# Patient Record
Sex: MALE | Race: WHITE | NOT HISPANIC OR LATINO | URBAN - METROPOLITAN AREA
[De-identification: names, ages, dates, MRNs, and addresses within clinical notes are randomized per-mention and may not be internally consistent; named-entity substitution may affect disease eponyms.]

---

## 2017-01-24 VITALS
HEART RATE: 60 BPM | HEART RATE: 61 BPM | RESPIRATION RATE: 17 BRPM | SYSTOLIC BLOOD PRESSURE: 138 MMHG | SYSTOLIC BLOOD PRESSURE: 135 MMHG | DIASTOLIC BLOOD PRESSURE: 60 MMHG | RESPIRATION RATE: 20 BRPM | HEIGHT: 66 IN | DIASTOLIC BLOOD PRESSURE: 79 MMHG | RESPIRATION RATE: 18 BRPM | SYSTOLIC BLOOD PRESSURE: 131 MMHG | SYSTOLIC BLOOD PRESSURE: 142 MMHG | OXYGEN SATURATION: 96 % | OXYGEN SATURATION: 92 % | HEART RATE: 67 BPM | WEIGHT: 235 LBS | HEART RATE: 59 BPM | SYSTOLIC BLOOD PRESSURE: 129 MMHG | DIASTOLIC BLOOD PRESSURE: 82 MMHG | SYSTOLIC BLOOD PRESSURE: 169 MMHG | DIASTOLIC BLOOD PRESSURE: 74 MMHG | HEART RATE: 62 BPM | OXYGEN SATURATION: 91 % | TEMPERATURE: 97.8 F | DIASTOLIC BLOOD PRESSURE: 66 MMHG | RESPIRATION RATE: 16 BRPM | OXYGEN SATURATION: 95 % | HEART RATE: 58 BPM | TEMPERATURE: 97.6 F

## 2017-01-25 ENCOUNTER — OFFICE (AMBULATORY)
Dept: URBAN - METROPOLITAN AREA CLINIC 64 | Facility: CLINIC | Age: 62
End: 2017-01-25
Payer: COMMERCIAL

## 2017-01-25 ENCOUNTER — AMBULATORY SURGICAL CENTER (AMBULATORY)
Dept: URBAN - METROPOLITAN AREA SURGERY 17 | Facility: SURGERY | Age: 62
End: 2017-01-25

## 2017-01-25 DIAGNOSIS — D50.9 IRON DEFICIENCY ANEMIA, UNSPECIFIED: ICD-10-CM

## 2017-01-25 DIAGNOSIS — K31.7 POLYP OF STOMACH AND DUODENUM: ICD-10-CM

## 2017-01-25 DIAGNOSIS — K29.70 GASTRITIS, UNSPECIFIED, WITHOUT BLEEDING: ICD-10-CM

## 2017-01-25 DIAGNOSIS — K21.9 GASTRO-ESOPHAGEAL REFLUX DISEASE WITHOUT ESOPHAGITIS: ICD-10-CM

## 2017-01-25 DIAGNOSIS — K29.50 UNSPECIFIED CHRONIC GASTRITIS WITHOUT BLEEDING: ICD-10-CM

## 2017-01-25 DIAGNOSIS — R19.5 OTHER FECAL ABNORMALITIES: ICD-10-CM

## 2017-01-25 LAB
GI HISTOLOGY: A. SELECT: (no result)
GI HISTOLOGY: B. SELECT: (no result)
GI HISTOLOGY: PDF REPORT: (no result)

## 2017-01-25 PROCEDURE — 43239 EGD BIOPSY SINGLE/MULTIPLE: CPT | Mod: 59 | Performed by: INTERNAL MEDICINE

## 2017-01-25 PROCEDURE — 88341 IMHCHEM/IMCYTCHM EA ADD ANTB: CPT | Performed by: INTERNAL MEDICINE

## 2017-01-25 PROCEDURE — 88342 IMHCHEM/IMCYTCHM 1ST ANTB: CPT | Performed by: INTERNAL MEDICINE

## 2017-01-25 PROCEDURE — 88305 TISSUE EXAM BY PATHOLOGIST: CPT | Performed by: INTERNAL MEDICINE

## 2017-01-25 PROCEDURE — 43251 EGD REMOVE LESION SNARE: CPT | Performed by: INTERNAL MEDICINE

## 2017-01-25 RX ADMIN — LIDOCAINE HYDROCHLORIDE 50 MG: 10 INJECTION, SOLUTION EPIDURAL; INFILTRATION; INTRACAUDAL; PERINEURAL at 12:56

## 2017-01-25 RX ADMIN — LIDOCAINE HYDROCHLORIDE 25 MG: 10 INJECTION, SOLUTION EPIDURAL; INFILTRATION; INTRACAUDAL; PERINEURAL at 12:59

## 2017-01-25 RX ADMIN — PROPOFOL 50 MG: 10 INJECTION, EMULSION INTRAVENOUS at 12:59

## 2017-01-25 RX ADMIN — PROPOFOL 100 MG: 10 INJECTION, EMULSION INTRAVENOUS at 12:56

## 2017-01-25 RX ADMIN — PROPOFOL 50 MG: 10 INJECTION, EMULSION INTRAVENOUS at 13:02

## 2017-01-25 RX ADMIN — PROPOFOL 50 MG: 10 INJECTION, EMULSION INTRAVENOUS at 12:58

## 2017-03-26 RX ORDER — LOSARTAN POTASSIUM 50 MG/1
TABLET ORAL
Qty: 135 TABLET | Refills: 0 | Status: SHIPPED | OUTPATIENT
Start: 2017-03-26 | End: 2017-06-12 | Stop reason: SDUPTHER

## 2017-03-27 ENCOUNTER — OFFICE (AMBULATORY)
Dept: URBAN - METROPOLITAN AREA CLINIC 75 | Facility: CLINIC | Age: 62
End: 2017-03-27

## 2017-03-27 DIAGNOSIS — K29.70 GASTRITIS, UNSPECIFIED, WITHOUT BLEEDING: ICD-10-CM

## 2017-03-27 DIAGNOSIS — Z98.890 OTHER SPECIFIED POSTPROCEDURAL STATES: ICD-10-CM

## 2017-03-27 DIAGNOSIS — K25.9 GASTRIC ULCER, UNSPECIFIED AS ACUTE OR CHRONIC, WITHOUT HEMO: ICD-10-CM

## 2017-03-27 DIAGNOSIS — Z79.82 LONG TERM (CURRENT) USE OF ASPIRIN: ICD-10-CM

## 2017-03-27 DIAGNOSIS — D64.9 ANEMIA, UNSPECIFIED: ICD-10-CM

## 2017-03-27 DIAGNOSIS — R19.5 OTHER FECAL ABNORMALITIES: ICD-10-CM

## 2017-03-27 PROCEDURE — 91110 GI TRC IMG INTRAL ESOPH-ILE: CPT | Performed by: INTERNAL MEDICINE

## 2017-03-29 DIAGNOSIS — D64.9 ANEMIA: ICD-10-CM

## 2017-03-29 DIAGNOSIS — Z12.5 SPECIAL SCREENING EXAMINATION FOR NEOPLASM OF PROSTATE: ICD-10-CM

## 2017-03-29 DIAGNOSIS — E78.5 HYPERLIPIDEMIA: ICD-10-CM

## 2017-03-29 DIAGNOSIS — K21.9 GASTROESOPHAGEAL REFLUX DISEASE WITHOUT ESOPHAGITIS: ICD-10-CM

## 2017-03-29 DIAGNOSIS — I10 ESSENTIAL HYPERTENSION: ICD-10-CM

## 2017-03-29 RX ORDER — AMLODIPINE BESYLATE 10 MG/1
TABLET ORAL
Qty: 90 TABLET | Refills: 0 | Status: SHIPPED | OUTPATIENT
Start: 2017-03-29 | End: 2017-06-12 | Stop reason: SDUPTHER

## 2017-06-12 DIAGNOSIS — I10 ESSENTIAL HYPERTENSION: ICD-10-CM

## 2017-06-12 DIAGNOSIS — Z12.5 SPECIAL SCREENING EXAMINATION FOR NEOPLASM OF PROSTATE: ICD-10-CM

## 2017-06-12 DIAGNOSIS — K21.9 GASTROESOPHAGEAL REFLUX DISEASE WITHOUT ESOPHAGITIS: ICD-10-CM

## 2017-06-12 DIAGNOSIS — D64.9 ANEMIA: ICD-10-CM

## 2017-06-12 DIAGNOSIS — E78.5 HYPERLIPIDEMIA: ICD-10-CM

## 2017-06-12 RX ORDER — AMLODIPINE BESYLATE 10 MG/1
TABLET ORAL
Qty: 90 TABLET | Refills: 0 | Status: SHIPPED | OUTPATIENT
Start: 2017-06-12 | End: 2017-09-09 | Stop reason: SDUPTHER

## 2017-06-12 RX ORDER — LOSARTAN POTASSIUM 50 MG/1
TABLET ORAL
Qty: 135 TABLET | Refills: 0 | Status: SHIPPED | OUTPATIENT
Start: 2017-06-12 | End: 2017-09-09 | Stop reason: SDUPTHER

## 2017-09-09 DIAGNOSIS — D64.9 ANEMIA: ICD-10-CM

## 2017-09-09 DIAGNOSIS — K21.9 GASTROESOPHAGEAL REFLUX DISEASE WITHOUT ESOPHAGITIS: ICD-10-CM

## 2017-09-09 DIAGNOSIS — E78.5 HYPERLIPIDEMIA: ICD-10-CM

## 2017-09-09 DIAGNOSIS — Z12.5 SPECIAL SCREENING EXAMINATION FOR NEOPLASM OF PROSTATE: ICD-10-CM

## 2017-09-09 DIAGNOSIS — I10 ESSENTIAL HYPERTENSION: ICD-10-CM

## 2017-09-11 RX ORDER — LOSARTAN POTASSIUM 50 MG/1
TABLET ORAL
Qty: 135 TABLET | Refills: 0 | Status: SHIPPED | OUTPATIENT
Start: 2017-09-11 | End: 2017-12-05 | Stop reason: SDUPTHER

## 2017-09-11 RX ORDER — AMLODIPINE BESYLATE 10 MG/1
TABLET ORAL
Qty: 90 TABLET | Refills: 0 | Status: SHIPPED | OUTPATIENT
Start: 2017-09-11 | End: 2017-12-05 | Stop reason: SDUPTHER

## 2017-09-13 ENCOUNTER — OFFICE VISIT (OUTPATIENT)
Dept: FAMILY MEDICINE CLINIC | Facility: CLINIC | Age: 62
End: 2017-09-13

## 2017-09-13 VITALS
BODY MASS INDEX: 39.05 KG/M2 | OXYGEN SATURATION: 93 % | HEART RATE: 59 BPM | TEMPERATURE: 98.1 F | HEIGHT: 66 IN | WEIGHT: 243 LBS | RESPIRATION RATE: 18 BRPM | SYSTOLIC BLOOD PRESSURE: 130 MMHG | DIASTOLIC BLOOD PRESSURE: 72 MMHG

## 2017-09-13 DIAGNOSIS — Z00.01 ENCOUNTER FOR PREVENTATIVE ADULT HEALTH CARE EXAM WITH ABNORMAL FINDINGS: Primary | ICD-10-CM

## 2017-09-13 DIAGNOSIS — R73.9 HYPERGLYCEMIA: Primary | ICD-10-CM

## 2017-09-13 LAB
25(OH)D3 SERPL-MCNC: 24.2 NG/ML (ref 30–100)
ALBUMIN SERPL-MCNC: 4.3 G/DL (ref 3.5–5.2)
ALBUMIN/GLOB SERPL: 1.7 G/DL
ALP SERPL-CCNC: 68 U/L (ref 39–117)
ALT SERPL W P-5'-P-CCNC: 49 U/L (ref 1–41)
ANION GAP SERPL CALCULATED.3IONS-SCNC: 14.9 MMOL/L
AST SERPL-CCNC: 30 U/L (ref 1–40)
BACTERIA UR QL AUTO: NORMAL /HPF
BILIRUB SERPL-MCNC: 0.6 MG/DL (ref 0.1–1.2)
BILIRUB UR QL STRIP: NEGATIVE
BUN BLD-MCNC: 16 MG/DL (ref 8–23)
BUN/CREAT SERPL: 15 (ref 7–25)
CALCIUM SPEC-SCNC: 9.1 MG/DL (ref 8.6–10.5)
CHLORIDE SERPL-SCNC: 100 MMOL/L (ref 98–107)
CHOLEST SERPL-MCNC: 154 MG/DL (ref 0–200)
CLARITY UR: CLEAR
CO2 SERPL-SCNC: 23.1 MMOL/L (ref 22–29)
COLOR UR: YELLOW
CREAT BLD-MCNC: 1.07 MG/DL (ref 0.76–1.27)
DEVELOPER EXPIRATION DATE: NORMAL
DEVELOPER LOT NUMBER: NORMAL
ERYTHROCYTE [DISTWIDTH] IN BLOOD BY AUTOMATED COUNT: 14.3 % (ref 4.5–15)
EXPIRATION DATE: NORMAL
FECAL OCCULT BLOOD SCREEN, POC: NEGATIVE
GFR SERPL CREATININE-BSD FRML MDRD: 70 ML/MIN/1.73
GLOBULIN UR ELPH-MCNC: 2.6 GM/DL
GLUCOSE BLD-MCNC: 139 MG/DL (ref 65–99)
GLUCOSE UR STRIP-MCNC: NEGATIVE MG/DL
HBA1C MFR BLD: 7.4 % (ref 4.8–5.6)
HCT VFR BLD AUTO: 42.3 % (ref 35–60)
HDLC SERPL-MCNC: 35 MG/DL (ref 40–60)
HGB BLD-MCNC: 14 G/DL (ref 13.5–18)
HGB UR QL STRIP.AUTO: NEGATIVE
KETONES UR QL STRIP: ABNORMAL
LDLC SERPL CALC-MCNC: 80 MG/DL (ref 0–100)
LDLC/HDLC SERPL: 2.29 {RATIO}
LEUKOCYTE ESTERASE UR QL STRIP.AUTO: NEGATIVE
LYMPHOCYTES # BLD AUTO: 2.3 10*3/MM3 (ref 1.2–3.4)
LYMPHOCYTES NFR BLD AUTO: 32.7 % (ref 21–51)
Lab: NORMAL
MCH RBC QN AUTO: 29.8 PG (ref 26.1–33.1)
MCHC RBC AUTO-ENTMCNC: 33.1 G/DL (ref 33–37)
MCV RBC AUTO: 90.1 FL (ref 80–99)
MONOCYTES # BLD AUTO: 0.6 10*3/MM3 (ref 0.1–0.6)
MONOCYTES NFR BLD AUTO: 8.3 % (ref 2–9)
NEGATIVE CONTROL: NEGATIVE
NEUTROPHILS # BLD AUTO: 4.1 10*3/MM3 (ref 1.4–6.5)
NEUTROPHILS NFR BLD AUTO: 59 % (ref 42–75)
NITRITE UR QL STRIP: NEGATIVE
PH UR STRIP.AUTO: 5.5 [PH] (ref 4.6–8)
PLATELET # BLD AUTO: 237 10*3/MM3 (ref 150–450)
PMV BLD AUTO: 7.7 FL (ref 7.1–10.5)
POSITIVE CONTROL: POSITIVE
POTASSIUM BLD-SCNC: 4.4 MMOL/L (ref 3.5–5.2)
PROT SERPL-MCNC: 6.9 G/DL (ref 6–8.5)
PROT UR QL STRIP: NEGATIVE
PSA SERPL-MCNC: 0.28 NG/ML (ref 0–4)
RBC # BLD AUTO: 4.69 10*6/MM3 (ref 4–6)
RBC # UR: NORMAL /HPF
REF LAB TEST METHOD: NORMAL
SODIUM BLD-SCNC: 138 MMOL/L (ref 136–145)
SP GR UR STRIP: 1.01 (ref 1–1.03)
SQUAMOUS #/AREA URNS HPF: NORMAL /HPF
T-UPTAKE NFR SERPL: 1.12 TBI (ref 0.8–1.3)
T4 SERPL-MCNC: 6.96 MCG/DL (ref 4.5–11.7)
TRIGL SERPL-MCNC: 195 MG/DL (ref 0–150)
TSH SERPL DL<=0.05 MIU/L-ACNC: 1.96 MIU/ML (ref 0.27–4.2)
UROBILINOGEN UR QL STRIP: ABNORMAL
VLDLC SERPL-MCNC: 39 MG/DL (ref 5–40)
WBC NRBC COR # BLD: 6.9 10*3/MM3 (ref 4.5–10)
WBC UR QL AUTO: NORMAL /HPF

## 2017-09-13 PROCEDURE — 93000 ELECTROCARDIOGRAM COMPLETE: CPT | Performed by: FAMILY MEDICINE

## 2017-09-13 PROCEDURE — 99396 PREV VISIT EST AGE 40-64: CPT | Performed by: FAMILY MEDICINE

## 2017-09-13 PROCEDURE — 84436 ASSAY OF TOTAL THYROXINE: CPT | Performed by: FAMILY MEDICINE

## 2017-09-13 PROCEDURE — 82306 VITAMIN D 25 HYDROXY: CPT | Performed by: FAMILY MEDICINE

## 2017-09-13 PROCEDURE — 84153 ASSAY OF PSA TOTAL: CPT | Performed by: FAMILY MEDICINE

## 2017-09-13 PROCEDURE — 85025 COMPLETE CBC W/AUTO DIFF WBC: CPT | Performed by: FAMILY MEDICINE

## 2017-09-13 PROCEDURE — 84479 ASSAY OF THYROID (T3 OR T4): CPT | Performed by: FAMILY MEDICINE

## 2017-09-13 PROCEDURE — 73560 X-RAY EXAM OF KNEE 1 OR 2: CPT | Performed by: FAMILY MEDICINE

## 2017-09-13 PROCEDURE — 71020 XR CHEST PA AND LATERAL: CPT | Performed by: FAMILY MEDICINE

## 2017-09-13 PROCEDURE — 80053 COMPREHEN METABOLIC PANEL: CPT | Performed by: FAMILY MEDICINE

## 2017-09-13 PROCEDURE — 80061 LIPID PANEL: CPT | Performed by: FAMILY MEDICINE

## 2017-09-13 PROCEDURE — 36415 COLL VENOUS BLD VENIPUNCTURE: CPT | Performed by: FAMILY MEDICINE

## 2017-09-13 PROCEDURE — 83036 HEMOGLOBIN GLYCOSYLATED A1C: CPT | Performed by: FAMILY MEDICINE

## 2017-09-13 PROCEDURE — 81001 URINALYSIS AUTO W/SCOPE: CPT | Performed by: FAMILY MEDICINE

## 2017-09-13 PROCEDURE — 84443 ASSAY THYROID STIM HORMONE: CPT | Performed by: FAMILY MEDICINE

## 2017-09-13 PROCEDURE — 82274 ASSAY TEST FOR BLOOD FECAL: CPT | Performed by: FAMILY MEDICINE

## 2017-09-13 RX ORDER — DIPHENHYDRAMINE HCL 25 MG
25 CAPSULE ORAL 2 TIMES DAILY PRN
COMMUNITY

## 2017-09-13 RX ORDER — FEXOFENADINE HCL 180 MG/1
180 TABLET ORAL DAILY
COMMUNITY

## 2017-09-13 NOTE — PROGRESS NOTES
SUBJECTIVE:   Bob Escamilla is a 62 y.o., male, who presents for a complete physical examination.    Occupation:  Reviewed   Family History:  Reviewed  Past Medical History: Reviewed  Surgical History:  Reviewed  Social History:  Reviewed    Review of Systems:  Constitutional:  Negative for chills, fever, dizziness, weight loss, diaphoresis,   Eyes:  Negative for blurred vision, redness, discharge, diplopia, photophobia or itching.  ENT:  Negative for earache, ST, toothache, rhinorrhea, hoarseness, or PND.  Musculoskeletal:  Negative for neck pain.  No heat, myalgia, redness patient complains of pain in his knees.  Cardiovascular:  Negative for orthopnea, chest pain, LE edema, palpitations, or rapid heart rate.    Skin:  Negative for bruising, swelling, rash, abrasions, or itching.    Respiratory:  Negatie for pleuritic chest pain, shortness of air, nonproductive/productive cough, hemoptysis, or LAM.    Neurologic:  Negative for history of weakness, numbness, paresthesia, loss of consciousness, speech change, or ataxia.    Gastrointestinal:  Negative for melena, hematochezia, nausea, vomiting, change in bowel habits.    Genitourinary:  Negative for flank pain, dysuria, frequency, or hematuria.    Psychiatric:  Negative for agitation, suicidal ideation, change in mental status, depression, confusion, or insomnia.  Hematologic:  Negative for nodes, bruising, bleeding, or petechiae.  Immunologic:  Negative for atopic dermatitis, sneezing, rhinorrhea, or hives.    ALLERGIES:  Reviewed-corticosteroids, benzamide derivatives    OBJECTIVE:    Vital Signs:  Reviewed and stable  Constitutional:  Alert and oriented x3, in no acute distress.  Eyes:  Sclerae white, conjunctivae clear.  Lids are without lag.  PERRLA.    Ears:  No scars, lesions or masses.  Tympanic membranes translucent, nonbulging, and mobile. Canal walls are pink.  Septum is midline.    Mouth:  Lips are pink and symmetrical.  Gums are pink.  Good dentition.     Throat:  Oral mucosa pink and moist.  Salivery glands intact.  Soft and hard palates contiguous.  Tongue moist without ulcers.  Gag reflex present.    Neck:  Full range of motion.  Trachea midline position.  No thyromegaly.   Respiratory:  Respirations are even and unlabored.  Lung fields with no flatness, dullness, or hyperresonance.  Clear and equal breath sounds with no adventitious sounds bilaterally.    Cardiovascular:  No lifts, heaves, or thrills.  PMI present.  S1 and S2 not exaggerated or diminished.  Regular rate and rhythm, without murmurs, rubs or gallops.  Normal carotids.  Pedal pulses are within normal limits bilaterally.  No edema.  No varicositis.    Chest: Equal bilateral expansion  Abdomen:  No masses or tenderness.  Bowel sounds active x4 quadrants.  Live and spleen are without tenderness or enlargement.  No hernias.    Digital Rectal Exam:  No masses.  Hemoccult negative.  States soft 1-2+ nontender  Genitalia:  Normal male no scrotal masses or tenderness.  Lymphatic:  Areas palpated are not enlarged.    Musculoskeletal:  Gait coordinated and smooth.  Digits are without clubbing or cyanosis.  Trace edema noted bilateral tib-fib.  Left knee reveals full range of motion mild tenderness medially.  Neurovascular status intact.  Skin:  No rashes, lesions, or ulcers.  No discoloration.  Warm and dry.  Normal turgor.    Neurologic:  Cranial nerves are intact.  Deep tendon reflexes are 2+ bilaterally.  Superficial touch and pain sensation intact bilaterally.  Psychiatric:  Judgement and insight are normal.  Orientation to time, place and person.  Normal mood and affect.  Memory intact.    EKG is done here and interpreted by me.  Compared to 2006 -EKG is unchanged .EKG shows sinus bradycardia.  Indication complete physical.  Chest x-rays done here and interpreted by me.  Chest x-ray comparison not available.  Indication complete physical.  Chest x-ray shows no acute abnormalities.  X-ray of the left  knee is done here interpreted by me.  No old x-ray for comparison.  Indication left knee pain.  X-ray shows degenerative changes.      ECG 12 Lead  Date/Time: 9/13/2017 6:55 AM  Performed by: FARHAN CÁRDENAS  Authorized by: FARHAN CÁRDENAS   Rhythm: sinus bradycardia            LABORATORY:  CBC CMP PSA TSH thyroid profile fasting lipids vitamin D urinalysis are ordered.    ASSESSMENT:  Complete physical examination.      PL.AN:  Diet and exercise discussed.  Patient will follow-up on labs.  Over-the-counter Aleve or Advil.  She was knee pain persists he'll be referred to his orthopedist.

## 2017-09-14 ENCOUNTER — LAB (OUTPATIENT)
Dept: FAMILY MEDICINE CLINIC | Facility: CLINIC | Age: 62
End: 2017-09-14

## 2017-09-14 DIAGNOSIS — Z00.01 ENCOUNTER FOR PREVENTATIVE ADULT HEALTH CARE EXAM WITH ABNORMAL FINDINGS: ICD-10-CM

## 2017-09-14 LAB — HEMOCCULT STL QL IA: NEGATIVE

## 2017-09-14 PROCEDURE — 82274 ASSAY TEST FOR BLOOD FECAL: CPT | Performed by: FAMILY MEDICINE

## 2017-09-19 ENCOUNTER — TELEPHONE (OUTPATIENT)
Dept: FAMILY MEDICINE CLINIC | Facility: CLINIC | Age: 62
End: 2017-09-19

## 2017-09-19 NOTE — TELEPHONE ENCOUNTER
----- Message from Conrad Fragoso MD sent at 9/14/2017  6:28 AM EDT -----  Vitamin D is low.  Take over-the-counter or thousand units daily recheck in 6 weeks.  Her A1c confirms diabetes.  At this number greater than 7.0 should probably be treated and take medicine.  Schedule an appointment fasting to go over specifics.    Pt informed of lab results

## 2017-09-19 NOTE — TELEPHONE ENCOUNTER
Pt called regarding labs I just told him his results take over the counter Vit D recheck 6 weeks and that Dr Fragoso wanted to see him to discuss diabetes DX

## 2017-10-16 ENCOUNTER — OFFICE VISIT (OUTPATIENT)
Dept: FAMILY MEDICINE CLINIC | Facility: CLINIC | Age: 62
End: 2017-10-16

## 2017-10-16 VITALS
HEIGHT: 66 IN | DIASTOLIC BLOOD PRESSURE: 78 MMHG | OXYGEN SATURATION: 94 % | BODY MASS INDEX: 38.73 KG/M2 | WEIGHT: 241 LBS | TEMPERATURE: 98 F | SYSTOLIC BLOOD PRESSURE: 136 MMHG | HEART RATE: 56 BPM

## 2017-10-16 DIAGNOSIS — E11.9 TYPE 2 DIABETES MELLITUS WITHOUT COMPLICATION, WITHOUT LONG-TERM CURRENT USE OF INSULIN (HCC): Primary | ICD-10-CM

## 2017-10-16 PROCEDURE — 99213 OFFICE O/P EST LOW 20 MIN: CPT | Performed by: FAMILY MEDICINE

## 2017-10-16 NOTE — PROGRESS NOTES
SUBJECTIVE:  The patient is a 62-year-old white male who comes in for follow-up.  On his last visit about a month ago is found to be diabetic with fasting sugar 139 A1c is 7.4.  Since he was notified of this he has been on a diet and his lost 10 pounds.     PAST MEDICAL HISTORY:  Reviewed.    REVIEW OF SYSTEMS:  Please see above; 14 point ROS otherwise negative.      OBJECTIVE: Vitals signs are reviewed and are stable.    HEENT: PERRLA.  []  Neck:  Supple.  []  Lungs:  Clear.    Heart:  Regular rate and rhythm.  []  Abdomen:   Soft, nontender.  []  Extremities:  No cyanosis, clubbing or edema.  []    ASSESSMENT:    []New-onset type 2 diabetes  Obesity    PLAN:  []Lengthy discussion with the patient.  He seems motivated.  He wants to try to lose weight and bring his numbers down over the next 3 months.  This sounded reasonable.  He drives a lot at work and it's difficult for him to eat during the day.  Exercise is encouraged.  He will follow-up in 3 months.  He is given a 2000-calorie diet.  He just saw an eye doctor 2 weeks ago who checked him for diabetic retinopathy.    Much of this encounter note is an electronic transcription/translation of spoken language to printed text.  The electronic translation of spoken language may permit erroneous, or at times, nonsensical words or phrases to be inadvertently transcribed.  Although I have reviewed the note for such errors, some may still exist.

## 2017-12-05 DIAGNOSIS — E78.5 HYPERLIPIDEMIA: ICD-10-CM

## 2017-12-05 DIAGNOSIS — K21.9 GASTROESOPHAGEAL REFLUX DISEASE WITHOUT ESOPHAGITIS: ICD-10-CM

## 2017-12-05 DIAGNOSIS — I10 ESSENTIAL HYPERTENSION: ICD-10-CM

## 2017-12-05 DIAGNOSIS — D64.9 ANEMIA: ICD-10-CM

## 2017-12-05 DIAGNOSIS — Z12.5 SPECIAL SCREENING EXAMINATION FOR NEOPLASM OF PROSTATE: ICD-10-CM

## 2017-12-05 RX ORDER — AMLODIPINE BESYLATE 10 MG/1
TABLET ORAL
Qty: 90 TABLET | Refills: 0 | Status: SHIPPED | OUTPATIENT
Start: 2017-12-05 | End: 2018-03-25 | Stop reason: SDUPTHER

## 2017-12-05 RX ORDER — LOSARTAN POTASSIUM 50 MG/1
TABLET ORAL
Qty: 135 TABLET | Refills: 0 | Status: SHIPPED | OUTPATIENT
Start: 2017-12-05 | End: 2018-03-25 | Stop reason: SDUPTHER

## 2018-01-15 ENCOUNTER — OFFICE VISIT (OUTPATIENT)
Dept: FAMILY MEDICINE CLINIC | Facility: CLINIC | Age: 63
End: 2018-01-15

## 2018-01-15 VITALS
TEMPERATURE: 98.4 F | SYSTOLIC BLOOD PRESSURE: 120 MMHG | HEIGHT: 66 IN | OXYGEN SATURATION: 95 % | HEART RATE: 59 BPM | WEIGHT: 230 LBS | BODY MASS INDEX: 36.96 KG/M2 | DIASTOLIC BLOOD PRESSURE: 68 MMHG

## 2018-01-15 DIAGNOSIS — E11.9 TYPE 2 DIABETES MELLITUS WITHOUT COMPLICATION, WITHOUT LONG-TERM CURRENT USE OF INSULIN (HCC): ICD-10-CM

## 2018-01-15 DIAGNOSIS — M19.90 ARTHRITIS: ICD-10-CM

## 2018-01-15 DIAGNOSIS — I10 ESSENTIAL HYPERTENSION: Primary | ICD-10-CM

## 2018-01-15 LAB
ALBUMIN SERPL-MCNC: 4.2 G/DL (ref 3.5–5.2)
ALBUMIN/GLOB SERPL: 1.3 G/DL
ALP SERPL-CCNC: 69 U/L (ref 39–117)
ALT SERPL W P-5'-P-CCNC: 23 U/L (ref 1–41)
ANION GAP SERPL CALCULATED.3IONS-SCNC: 8.9 MMOL/L
AST SERPL-CCNC: 18 U/L (ref 1–40)
BILIRUB SERPL-MCNC: 0.2 MG/DL (ref 0.1–1.2)
BUN BLD-MCNC: 18 MG/DL (ref 8–23)
BUN/CREAT SERPL: 20.7 (ref 7–25)
CALCIUM SPEC-SCNC: 9.1 MG/DL (ref 8.6–10.5)
CHLORIDE SERPL-SCNC: 100 MMOL/L (ref 98–107)
CHOLEST SERPL-MCNC: 139 MG/DL (ref 0–200)
CO2 SERPL-SCNC: 27.1 MMOL/L (ref 22–29)
CREAT BLD-MCNC: 0.87 MG/DL (ref 0.76–1.27)
GFR SERPL CREATININE-BSD FRML MDRD: 90 ML/MIN/1.73
GLOBULIN UR ELPH-MCNC: 3.3 GM/DL
GLUCOSE BLD-MCNC: 97 MG/DL (ref 65–99)
HBA1C MFR BLD: 6.17 % (ref 4.8–5.6)
HDLC SERPL-MCNC: 30 MG/DL (ref 40–60)
LDLC SERPL CALC-MCNC: 82 MG/DL (ref 0–100)
LDLC/HDLC SERPL: 2.75 {RATIO}
POTASSIUM BLD-SCNC: 4 MMOL/L (ref 3.5–5.2)
PROT SERPL-MCNC: 7.5 G/DL (ref 6–8.5)
SODIUM BLD-SCNC: 136 MMOL/L (ref 136–145)
TRIGL SERPL-MCNC: 133 MG/DL (ref 0–150)
VLDLC SERPL-MCNC: 26.6 MG/DL (ref 5–40)

## 2018-01-15 PROCEDURE — 99213 OFFICE O/P EST LOW 20 MIN: CPT | Performed by: FAMILY MEDICINE

## 2018-01-15 PROCEDURE — 83036 HEMOGLOBIN GLYCOSYLATED A1C: CPT | Performed by: FAMILY MEDICINE

## 2018-01-15 PROCEDURE — 80053 COMPREHEN METABOLIC PANEL: CPT | Performed by: FAMILY MEDICINE

## 2018-01-15 PROCEDURE — 80061 LIPID PANEL: CPT | Performed by: FAMILY MEDICINE

## 2018-01-15 PROCEDURE — 36415 COLL VENOUS BLD VENIPUNCTURE: CPT | Performed by: FAMILY MEDICINE

## 2018-01-15 NOTE — PROGRESS NOTES
SUBJECTIVE:  The patient is a 8-year-old white male is here for follow-up.  He has type 2 diabetes.  He does not want be on medication.  He's lost about 21 pounds since the diagnosis.  Following the diet fairly strictly.  He has no complaints today    PAST MEDICAL HISTORY:  Reviewed.    REVIEW OF SYSTEMS:  Please see above; 14 point ROS otherwise negative.      OBJECTIVE: Vitals signs are reviewed and are stable.    HEENT: PERRLA.    Neck:  Supple.    Lungs:  Clear.    Heart:  Regular rate and rhythm.    Abdomen:   Soft, nontender.    Extremities:  No cyanosis, clubbing or edema.      ASSESSMENT:    Type 2 diabetes-uncomplicated  Hypertension    PLAN:  CMP lipids hemoglobin A1c are ordered.  The patient will follow up on his labs.  Continue healthy lifestyle.  Call if problems.    Much of this encounter note is an electronic transcription/translation of spoken language to printed text.  The electronic translation of spoken language may permit erroneous, or at times, nonsensical words or phrases to be inadvertently transcribed.  Although I have reviewed the note for such errors, some may still exist.

## 2018-03-25 DIAGNOSIS — Z12.5 SPECIAL SCREENING EXAMINATION FOR NEOPLASM OF PROSTATE: ICD-10-CM

## 2018-03-25 DIAGNOSIS — K21.9 GASTROESOPHAGEAL REFLUX DISEASE WITHOUT ESOPHAGITIS: ICD-10-CM

## 2018-03-25 DIAGNOSIS — I10 ESSENTIAL HYPERTENSION: ICD-10-CM

## 2018-03-25 DIAGNOSIS — E78.5 HYPERLIPIDEMIA: ICD-10-CM

## 2018-03-25 DIAGNOSIS — D64.9 ANEMIA: ICD-10-CM

## 2018-03-26 RX ORDER — AMLODIPINE BESYLATE 10 MG/1
TABLET ORAL
Qty: 90 TABLET | Refills: 0 | Status: SHIPPED | OUTPATIENT
Start: 2018-03-26 | End: 2018-06-19 | Stop reason: SDUPTHER

## 2018-03-26 RX ORDER — LOSARTAN POTASSIUM 50 MG/1
TABLET ORAL
Qty: 135 TABLET | Refills: 0 | Status: SHIPPED | OUTPATIENT
Start: 2018-03-26 | End: 2018-06-19 | Stop reason: SDUPTHER

## 2018-04-09 ENCOUNTER — OFFICE VISIT (OUTPATIENT)
Dept: FAMILY MEDICINE CLINIC | Facility: CLINIC | Age: 63
End: 2018-04-09

## 2018-04-09 VITALS
DIASTOLIC BLOOD PRESSURE: 68 MMHG | RESPIRATION RATE: 18 BRPM | HEIGHT: 66 IN | OXYGEN SATURATION: 95 % | WEIGHT: 224 LBS | SYSTOLIC BLOOD PRESSURE: 120 MMHG | HEART RATE: 80 BPM | BODY MASS INDEX: 36 KG/M2 | TEMPERATURE: 98.5 F

## 2018-04-09 DIAGNOSIS — F41.9 ANXIETY: ICD-10-CM

## 2018-04-09 DIAGNOSIS — G47.00 INSOMNIA, UNSPECIFIED TYPE: ICD-10-CM

## 2018-04-09 DIAGNOSIS — E78.5 HYPERLIPIDEMIA, UNSPECIFIED HYPERLIPIDEMIA TYPE: Primary | ICD-10-CM

## 2018-04-09 DIAGNOSIS — I10 ESSENTIAL HYPERTENSION: ICD-10-CM

## 2018-04-09 LAB
ALBUMIN SERPL-MCNC: 4.1 G/DL (ref 3.5–5.2)
ALBUMIN/GLOB SERPL: 1.5 G/DL
ALP SERPL-CCNC: 66 U/L (ref 39–117)
ALT SERPL W P-5'-P-CCNC: 22 U/L (ref 1–41)
ANION GAP SERPL CALCULATED.3IONS-SCNC: 12.9 MMOL/L
AST SERPL-CCNC: 13 U/L (ref 1–40)
BILIRUB SERPL-MCNC: 0.5 MG/DL (ref 0.1–1.2)
BUN BLD-MCNC: 16 MG/DL (ref 8–23)
BUN/CREAT SERPL: 17.6 (ref 7–25)
CALCIUM SPEC-SCNC: 8.9 MG/DL (ref 8.6–10.5)
CHLORIDE SERPL-SCNC: 103 MMOL/L (ref 98–107)
CHOLEST SERPL-MCNC: 133 MG/DL (ref 0–200)
CO2 SERPL-SCNC: 23.1 MMOL/L (ref 22–29)
CREAT BLD-MCNC: 0.91 MG/DL (ref 0.76–1.27)
GFR SERPL CREATININE-BSD FRML MDRD: 85 ML/MIN/1.73
GLOBULIN UR ELPH-MCNC: 2.8 GM/DL
GLUCOSE BLD-MCNC: 119 MG/DL (ref 65–99)
HBA1C MFR BLD: 6.22 % (ref 4.8–5.6)
HDLC SERPL-MCNC: 36 MG/DL (ref 40–60)
LDLC SERPL CALC-MCNC: 82 MG/DL (ref 0–100)
LDLC/HDLC SERPL: 2.27 {RATIO}
POTASSIUM BLD-SCNC: 4.2 MMOL/L (ref 3.5–5.2)
PROT SERPL-MCNC: 6.9 G/DL (ref 6–8.5)
SODIUM BLD-SCNC: 139 MMOL/L (ref 136–145)
TRIGL SERPL-MCNC: 76 MG/DL (ref 0–150)
VLDLC SERPL-MCNC: 15.2 MG/DL (ref 5–40)

## 2018-04-09 PROCEDURE — 80061 LIPID PANEL: CPT | Performed by: FAMILY MEDICINE

## 2018-04-09 PROCEDURE — 83036 HEMOGLOBIN GLYCOSYLATED A1C: CPT | Performed by: FAMILY MEDICINE

## 2018-04-09 PROCEDURE — 36415 COLL VENOUS BLD VENIPUNCTURE: CPT | Performed by: FAMILY MEDICINE

## 2018-04-09 PROCEDURE — 99214 OFFICE O/P EST MOD 30 MIN: CPT | Performed by: FAMILY MEDICINE

## 2018-04-09 PROCEDURE — 80053 COMPREHEN METABOLIC PANEL: CPT | Performed by: FAMILY MEDICINE

## 2018-04-09 RX ORDER — DOXEPIN HYDROCHLORIDE 10 MG/1
10 CAPSULE ORAL NIGHTLY
Qty: 90 CAPSULE | Refills: 1 | Status: SHIPPED | OUTPATIENT
Start: 2018-04-09 | End: 2022-08-30 | Stop reason: HOSPADM

## 2018-04-09 RX ORDER — DOXEPIN HYDROCHLORIDE 10 MG/1
10 CAPSULE ORAL NIGHTLY
Qty: 30 CAPSULE | Refills: 1 | Status: SHIPPED | OUTPATIENT
Start: 2018-04-09 | End: 2018-04-09 | Stop reason: SDUPTHER

## 2018-04-09 NOTE — PROGRESS NOTES
SUBJECTIVE:  The patient is a 62-year-old white male is here for follow-up.  He has type II diabetes he is on no medicine.  He is trying to control with diet and exercise.  He is lost 5 pounds since his last visit.  It's hard for him to find the time to exercise.  He's having some anxiety which is interfering with sleep.  Occasionally this gives him a slight headache.    PAST MEDICAL HISTORY:  Reviewed.    REVIEW OF SYSTEMS:  Please see above; 14 point ROS negative.      OBJECTIVE: Vitals signs are reviewed and are stable.    HEENT: PERRLA.   Neck:  Supple.   Lungs:  Clear.    Heart:  Regular rate and rhythm.   Abdomen:   Soft, nontender.   Extremities:  No cyanosis, clubbing or edema.     ASSESSMENT:    Type 2 diabetes  Hypertension  Insomnia  Anxiety    PLAN: CMP fasting lipid hemoglobin A1c.  Doxepin 10 mg daily at bedtime as prescribed.  Patient will follow up on labs.  He will let me know the medicine helps.  Continue healthy lifestyle.  Call if problems.

## 2018-04-10 ENCOUNTER — TELEPHONE (OUTPATIENT)
Dept: FAMILY MEDICINE CLINIC | Facility: CLINIC | Age: 63
End: 2018-04-10

## 2018-04-10 RX ORDER — TRAZODONE HYDROCHLORIDE 50 MG/1
50 TABLET ORAL NIGHTLY
Qty: 90 TABLET | Refills: 1 | Status: SHIPPED | OUTPATIENT
Start: 2018-04-10 | End: 2020-08-19

## 2018-04-10 RX ORDER — TRAZODONE HYDROCHLORIDE 50 MG/1
50 TABLET ORAL NIGHTLY
Qty: 30 TABLET | Refills: 1 | Status: SHIPPED | OUTPATIENT
Start: 2018-04-10 | End: 2018-04-10 | Stop reason: SDUPTHER

## 2018-06-19 DIAGNOSIS — K21.9 GASTROESOPHAGEAL REFLUX DISEASE WITHOUT ESOPHAGITIS: ICD-10-CM

## 2018-06-19 DIAGNOSIS — I10 ESSENTIAL HYPERTENSION: ICD-10-CM

## 2018-06-19 DIAGNOSIS — E78.5 HYPERLIPIDEMIA: ICD-10-CM

## 2018-06-19 DIAGNOSIS — D64.9 ANEMIA: ICD-10-CM

## 2018-06-19 DIAGNOSIS — Z12.5 SPECIAL SCREENING EXAMINATION FOR NEOPLASM OF PROSTATE: ICD-10-CM

## 2018-06-19 RX ORDER — LOSARTAN POTASSIUM 50 MG/1
TABLET ORAL
Qty: 135 TABLET | Refills: 1 | Status: SHIPPED | OUTPATIENT
Start: 2018-06-19 | End: 2018-12-05 | Stop reason: SDUPTHER

## 2018-06-19 RX ORDER — AMLODIPINE BESYLATE 10 MG/1
TABLET ORAL
Qty: 90 TABLET | Refills: 1 | Status: SHIPPED | OUTPATIENT
Start: 2018-06-19 | End: 2018-12-05 | Stop reason: SDUPTHER

## 2018-07-08 PROBLEM — Z79.4 TYPE 2 DIABETES MELLITUS WITHOUT COMPLICATION, WITH LONG-TERM CURRENT USE OF INSULIN (HCC): Status: ACTIVE | Noted: 2018-07-08

## 2018-07-08 PROBLEM — E11.9 TYPE 2 DIABETES MELLITUS WITHOUT COMPLICATION, WITH LONG-TERM CURRENT USE OF INSULIN: Status: ACTIVE | Noted: 2018-07-08

## 2018-07-09 ENCOUNTER — OFFICE VISIT (OUTPATIENT)
Dept: FAMILY MEDICINE CLINIC | Facility: CLINIC | Age: 63
End: 2018-07-09

## 2018-07-09 VITALS
HEART RATE: 52 BPM | TEMPERATURE: 98.4 F | DIASTOLIC BLOOD PRESSURE: 64 MMHG | WEIGHT: 237 LBS | HEIGHT: 66 IN | SYSTOLIC BLOOD PRESSURE: 124 MMHG | BODY MASS INDEX: 38.09 KG/M2 | OXYGEN SATURATION: 96 %

## 2018-07-09 DIAGNOSIS — E11.9 TYPE 2 DIABETES MELLITUS WITHOUT COMPLICATION, WITHOUT LONG-TERM CURRENT USE OF INSULIN (HCC): Primary | ICD-10-CM

## 2018-07-09 DIAGNOSIS — I10 ESSENTIAL HYPERTENSION: ICD-10-CM

## 2018-07-09 LAB
ALBUMIN SERPL-MCNC: 4.4 G/DL (ref 3.5–5.2)
ALBUMIN/GLOB SERPL: 1.5 G/DL
ALP SERPL-CCNC: 63 U/L (ref 39–117)
ALT SERPL W P-5'-P-CCNC: 17 U/L (ref 1–41)
ANION GAP SERPL CALCULATED.3IONS-SCNC: 15.6 MMOL/L
AST SERPL-CCNC: 22 U/L (ref 1–40)
BILIRUB SERPL-MCNC: 0.3 MG/DL (ref 0.1–1.2)
BUN BLD-MCNC: 17 MG/DL (ref 8–23)
BUN/CREAT SERPL: 20 (ref 7–25)
CALCIUM SPEC-SCNC: 8.7 MG/DL (ref 8.6–10.5)
CHLORIDE SERPL-SCNC: 98 MMOL/L (ref 98–107)
CHOLEST SERPL-MCNC: 135 MG/DL (ref 0–200)
CO2 SERPL-SCNC: 22.4 MMOL/L (ref 22–29)
CREAT BLD-MCNC: 0.85 MG/DL (ref 0.76–1.27)
GFR SERPL CREATININE-BSD FRML MDRD: 92 ML/MIN/1.73
GLOBULIN UR ELPH-MCNC: 2.9 GM/DL
GLUCOSE BLD-MCNC: 105 MG/DL (ref 65–99)
HBA1C MFR BLD: 6.1 % (ref 4.8–5.6)
HDLC SERPL-MCNC: 38 MG/DL (ref 40–60)
LDLC SERPL CALC-MCNC: 70 MG/DL (ref 0–100)
LDLC/HDLC SERPL: 1.84 {RATIO}
POTASSIUM BLD-SCNC: 4.3 MMOL/L (ref 3.5–5.2)
PROT SERPL-MCNC: 7.3 G/DL (ref 6–8.5)
SODIUM BLD-SCNC: 136 MMOL/L (ref 136–145)
TRIGL SERPL-MCNC: 136 MG/DL (ref 0–150)
VLDLC SERPL-MCNC: 27.2 MG/DL (ref 5–40)

## 2018-07-09 PROCEDURE — 36415 COLL VENOUS BLD VENIPUNCTURE: CPT | Performed by: FAMILY MEDICINE

## 2018-07-09 PROCEDURE — 99213 OFFICE O/P EST LOW 20 MIN: CPT | Performed by: FAMILY MEDICINE

## 2018-07-09 PROCEDURE — 80053 COMPREHEN METABOLIC PANEL: CPT | Performed by: FAMILY MEDICINE

## 2018-07-09 PROCEDURE — 80061 LIPID PANEL: CPT | Performed by: FAMILY MEDICINE

## 2018-07-09 PROCEDURE — 83036 HEMOGLOBIN GLYCOSYLATED A1C: CPT | Performed by: FAMILY MEDICINE

## 2018-07-09 NOTE — PROGRESS NOTES
SUBJECTIVE:  The patient is [age]-year-old white male's here for follow-up.  He has hypertension and type 2 diabetes.  When he is originally diagnosed he lost 20 pounds over 3 months about his A1c down to 6.2.  Since his last visit he has gained weight.  He works a lot of hours and doesn't have time to exercise.    PAST MEDICAL HISTORY:  Reviewed.    REVIEW OF SYSTEMS:  Please see above; 14 point ROS negative.      OBJECTIVE: Vitals signs are reviewed and are stable.    HEENT: PERRLA.   Neck:  Supple.   Lungs:  Clear.    Heart:  Regular rate and rhythm.   Abdomen:   Soft, nontender.   Extremities:  No cyanosis, clubbing or edema.     ASSESSMENT:   Type 2 diabetes  Hypertension     PLAN: CMP fasting lipid hemoglobin A1c drawn.  Patient will follow up on labs.  Further treatment pending lab results.  He will call if problems.    Dictated utilizing Dragon dictation.

## 2018-10-08 ENCOUNTER — OFFICE VISIT (OUTPATIENT)
Dept: FAMILY MEDICINE CLINIC | Facility: CLINIC | Age: 63
End: 2018-10-08

## 2018-10-08 VITALS
DIASTOLIC BLOOD PRESSURE: 62 MMHG | SYSTOLIC BLOOD PRESSURE: 134 MMHG | BODY MASS INDEX: 37.12 KG/M2 | TEMPERATURE: 98.3 F | HEIGHT: 66 IN | OXYGEN SATURATION: 95 % | HEART RATE: 54 BPM | WEIGHT: 231 LBS

## 2018-10-08 DIAGNOSIS — I10 ESSENTIAL HYPERTENSION: ICD-10-CM

## 2018-10-08 DIAGNOSIS — E11.9 TYPE 2 DIABETES MELLITUS WITHOUT COMPLICATION, WITH LONG-TERM CURRENT USE OF INSULIN (HCC): Primary | ICD-10-CM

## 2018-10-08 DIAGNOSIS — Z79.4 TYPE 2 DIABETES MELLITUS WITHOUT COMPLICATION, WITH LONG-TERM CURRENT USE OF INSULIN (HCC): Primary | ICD-10-CM

## 2018-10-08 PROCEDURE — 80061 LIPID PANEL: CPT | Performed by: FAMILY MEDICINE

## 2018-10-08 PROCEDURE — 83036 HEMOGLOBIN GLYCOSYLATED A1C: CPT | Performed by: FAMILY MEDICINE

## 2018-10-08 PROCEDURE — 99213 OFFICE O/P EST LOW 20 MIN: CPT | Performed by: FAMILY MEDICINE

## 2018-10-08 PROCEDURE — 36415 COLL VENOUS BLD VENIPUNCTURE: CPT | Performed by: FAMILY MEDICINE

## 2018-10-08 PROCEDURE — 80053 COMPREHEN METABOLIC PANEL: CPT | Performed by: FAMILY MEDICINE

## 2018-10-08 RX ORDER — INFLUENZA A VIRUS A/SINGAPORE/GP1908/2015 IVR-180A (H1N1) ANTIGEN (PROPIOLACTONE INACTIVATED), INFLUENZA A VIRUS A/SINGAPORE/INFIMH-16-0019/2016 IVR-186 (H3N2) ANTIGEN (PROPIOLACTONE INACTIVATED), INFLUENZA B VIRUS B/MARYLAND/15/2016 ANTIGEN (PROPIOLACTONE INACTIVATED), AND INFLUENZA B VIRUS B/PHUKET/3073/2013 BVR-1B ANTIGEN (PROPIOLACTONE INACTIVATED) 15; 15; 15; 15 UG/.5ML; UG/.5ML; UG/.5ML; UG/.5ML
INJECTION, SUSPENSION INTRAMUSCULAR
Refills: 0 | Status: ON HOLD | COMMUNITY
Start: 2018-09-21 | End: 2022-08-30

## 2018-10-08 NOTE — PROGRESS NOTES
SUBJECTIVE:  The patient is a 63-year-old white male who is here for follow-up for his diabetes.  He is not on medication at this time per his own choice.  He has been doing fairly well with diet control however in July his A1c was above 6.  By our scales he has lost 6 pounds since his last visit.    PAST MEDICAL HISTORY:  Reviewed.    REVIEW OF SYSTEMS:  Please see above; 14 point ROS negative.      OBJECTIVE: Vitals signs are reviewed and are stable.    HEENT: PERRLA.   Neck:  Supple.   Lungs:  Clear.    Heart:  Regular rate and rhythm.   Abdomen:   Soft, nontender.   Extremities:  No cyanosis, clubbing or edema.     ASSESSMENT:  Type 2 diabetes-diet controlled  Hypertension  Obesity      PLAN: CMP fasting lipids hemoglobin A1c.  Healthy lifestyle discussed.  Follow up on labs.  Call if problems.    Dictated utilizing Dragon dictation.

## 2018-10-09 LAB
ALBUMIN SERPL-MCNC: 4.3 G/DL (ref 3.5–5.2)
ALBUMIN/GLOB SERPL: 1.5 G/DL
ALP SERPL-CCNC: 58 U/L (ref 39–117)
ALT SERPL W P-5'-P-CCNC: 19 U/L (ref 1–41)
ANION GAP SERPL CALCULATED.3IONS-SCNC: 14.2 MMOL/L
AST SERPL-CCNC: 18 U/L (ref 1–40)
BILIRUB SERPL-MCNC: 0.3 MG/DL (ref 0.1–1.2)
BUN BLD-MCNC: 18 MG/DL (ref 8–23)
BUN/CREAT SERPL: 21.7 (ref 7–25)
CALCIUM SPEC-SCNC: 9.2 MG/DL (ref 8.6–10.5)
CHLORIDE SERPL-SCNC: 102 MMOL/L (ref 98–107)
CHOLEST SERPL-MCNC: 127 MG/DL (ref 0–200)
CO2 SERPL-SCNC: 22.8 MMOL/L (ref 22–29)
CREAT BLD-MCNC: 0.83 MG/DL (ref 0.76–1.27)
GFR SERPL CREATININE-BSD FRML MDRD: 94 ML/MIN/1.73
GLOBULIN UR ELPH-MCNC: 2.9 GM/DL
GLUCOSE BLD-MCNC: 100 MG/DL (ref 65–99)
HBA1C MFR BLD: 6.3 % (ref 4.8–5.6)
HDLC SERPL-MCNC: 32 MG/DL (ref 40–60)
LDLC SERPL CALC-MCNC: 60 MG/DL (ref 0–100)
LDLC/HDLC SERPL: 1.88 {RATIO}
POTASSIUM BLD-SCNC: 3.9 MMOL/L (ref 3.5–5.2)
PROT SERPL-MCNC: 7.2 G/DL (ref 6–8.5)
SODIUM BLD-SCNC: 139 MMOL/L (ref 136–145)
TRIGL SERPL-MCNC: 175 MG/DL (ref 0–150)
VLDLC SERPL-MCNC: 35 MG/DL (ref 5–40)

## 2018-11-12 ENCOUNTER — OFFICE VISIT (OUTPATIENT)
Dept: FAMILY MEDICINE CLINIC | Facility: CLINIC | Age: 63
End: 2018-11-12

## 2018-11-12 VITALS
OXYGEN SATURATION: 97 % | HEART RATE: 57 BPM | RESPIRATION RATE: 16 BRPM | BODY MASS INDEX: 37.22 KG/M2 | HEIGHT: 66 IN | TEMPERATURE: 98.7 F | SYSTOLIC BLOOD PRESSURE: 130 MMHG | DIASTOLIC BLOOD PRESSURE: 70 MMHG | WEIGHT: 231.6 LBS

## 2018-11-12 DIAGNOSIS — J02.9 ACUTE PHARYNGITIS, UNSPECIFIED ETIOLOGY: ICD-10-CM

## 2018-11-12 DIAGNOSIS — H66.003 ACUTE SUPPURATIVE OTITIS MEDIA OF BOTH EARS WITHOUT SPONTANEOUS RUPTURE OF TYMPANIC MEMBRANES, RECURRENCE NOT SPECIFIED: Primary | ICD-10-CM

## 2018-11-12 DIAGNOSIS — J06.9 ACUTE URI: ICD-10-CM

## 2018-11-12 PROCEDURE — 99213 OFFICE O/P EST LOW 20 MIN: CPT | Performed by: NURSE PRACTITIONER

## 2018-11-12 RX ORDER — FLUTICASONE PROPIONATE 50 MCG
2 SPRAY, SUSPENSION (ML) NASAL DAILY
Qty: 1 BOTTLE | Refills: 3 | Status: ON HOLD | OUTPATIENT
Start: 2018-11-12 | End: 2022-08-30

## 2018-11-12 RX ORDER — AMOXICILLIN 875 MG/1
875 TABLET, COATED ORAL 2 TIMES DAILY
Qty: 14 TABLET | Refills: 0 | Status: SHIPPED | OUTPATIENT
Start: 2018-11-12 | End: 2018-11-19

## 2018-11-12 RX ORDER — ALBUTEROL SULFATE 90 UG/1
2 AEROSOL, METERED RESPIRATORY (INHALATION) EVERY 4 HOURS PRN
Qty: 1 INHALER | Refills: 1 | Status: SHIPPED | OUTPATIENT
Start: 2018-11-12

## 2018-11-12 NOTE — PROGRESS NOTES
Subjective   Bob Escamilla is a 63 y.o. male.     History of Present Illness   C/o congestion, cough, sneezing, nausea, aches, HA, chills, ear pressure, started about 3 days ago, does have seasonal allergies, taking allegra, he has non productive cough, he has nasal congestion, states his wife has also been sick. He tried coricidin OTC, also tried tylenol cold and flu. He did have sore throat, he denies wheezing. He has used albuterol inhaler at home. He has hx of asthma with exercise, he denies smoking.     The following portions of the patient's history were reviewed and updated as appropriate: allergies, current medications, past family history, past medical history, past social history, past surgical history and problem list.    Review of Systems   Constitutional: Positive for chills, diaphoresis and fever.   HENT: Positive for congestion, ear pain, postnasal drip, rhinorrhea, sinus pressure, sneezing and sore throat.    Respiratory: Positive for cough. Negative for shortness of breath and wheezing.    Cardiovascular: Negative for chest pain.   Musculoskeletal: Negative for arthralgias and myalgias.   Skin: Negative for skin lesions.   Allergic/Immunologic: Positive for environmental allergies.   Neurological: Positive for headache. Negative for dizziness and light-headedness.   All other systems reviewed and are negative.      Objective   Physical Exam   Constitutional: He is oriented to person, place, and time. He appears well-developed and well-nourished.   HENT:   Head: Normocephalic.   Right Ear: Ear canal normal. Tympanic membrane is erythematous. Tympanic membrane mobility is abnormal.   Left Ear: Ear canal normal. Tympanic membrane is erythematous. Tympanic membrane mobility is abnormal.   Nose: Right sinus exhibits frontal sinus tenderness. Left sinus exhibits frontal sinus tenderness.   Mouth/Throat: Uvula is midline and mucous membranes are normal. Posterior oropharyngeal erythema present.   Eyes:  Pupils are equal, round, and reactive to light.   Neck: Normal range of motion.   Cardiovascular: Normal rate, regular rhythm and normal heart sounds.   Pulmonary/Chest: Effort normal and breath sounds normal.   Musculoskeletal: Normal range of motion.   Lymphadenopathy:     He has no cervical adenopathy.   Neurological: He is alert and oriented to person, place, and time.   Skin: Skin is warm and dry.   Psychiatric: He has a normal mood and affect. His behavior is normal.   Nursing note and vitals reviewed.        Assessment/Plan   Bob was seen today for uri.    Diagnoses and all orders for this visit:    Acute suppurative otitis media of both ears without spontaneous rupture of tympanic membranes, recurrence not specified    Acute URI    Acute pharyngitis, unspecified etiology    Other orders  -     albuterol (PROVENTIL HFA;VENTOLIN HFA) 108 (90 Base) MCG/ACT inhaler; Inhale 2 puffs Every 4 (Four) Hours As Needed for Wheezing.  -     fluticasone (FLONASE) 50 MCG/ACT nasal spray; 2 sprays into the nostril(s) as directed by provider Daily.  -     amoxicillin (AMOXIL) 875 MG tablet; Take 1 tablet by mouth 2 (Two) Times a Day for 7 days.      Start amoxicillin 875mg 2x day for 7 days.  flonase 2 sprays each nostril daily.   Albuterol inhaler 2 puffs every 4 hours as needed for wheezing.   If symptoms persist 5-7 days call office.   Increase fluid intake, get plenty of rest.   Patient agrees with plan of care and understands instructions. Call if worsening symptoms or any problems or concerns.

## 2018-11-12 NOTE — PATIENT INSTRUCTIONS
Start amoxicillin 875mg 2x day for 7 days.  flonase 2 sprays each nostril daily.   Albuterol inhaler 2 puffs every 4 hours as needed for wheezing.   If symptoms persist 5-7 days call office.   Increase fluid intake, get plenty of rest.   Patient agrees with plan of care and understands instructions. Call if worsening symptoms or any problems or concerns.

## 2018-12-05 DIAGNOSIS — K21.9 GASTROESOPHAGEAL REFLUX DISEASE WITHOUT ESOPHAGITIS: ICD-10-CM

## 2018-12-05 DIAGNOSIS — E78.5 HYPERLIPIDEMIA: ICD-10-CM

## 2018-12-05 DIAGNOSIS — I10 ESSENTIAL HYPERTENSION: ICD-10-CM

## 2018-12-05 DIAGNOSIS — Z12.5 SPECIAL SCREENING EXAMINATION FOR NEOPLASM OF PROSTATE: ICD-10-CM

## 2018-12-05 DIAGNOSIS — D64.9 ANEMIA: ICD-10-CM

## 2018-12-05 RX ORDER — AMLODIPINE BESYLATE 10 MG/1
TABLET ORAL
Qty: 90 TABLET | Refills: 0 | Status: SHIPPED | OUTPATIENT
Start: 2018-12-05 | End: 2019-03-04 | Stop reason: SDUPTHER

## 2018-12-05 RX ORDER — LOSARTAN POTASSIUM 50 MG/1
TABLET ORAL
Qty: 135 TABLET | Refills: 0 | Status: SHIPPED | OUTPATIENT
Start: 2018-12-05 | End: 2019-03-04 | Stop reason: SDUPTHER

## 2018-12-26 ENCOUNTER — TELEPHONE (OUTPATIENT)
Dept: FAMILY MEDICINE CLINIC | Facility: CLINIC | Age: 63
End: 2018-12-26

## 2018-12-26 NOTE — TELEPHONE ENCOUNTER
Pt came in around 4 pm because he wasn't called back yet, and he is sick, and wanted to be seen. He was told at the  that we do not have any appts today. He walked out mad. I called him back to let him know Alice would work him in tomorrow at 1:15 pm and that we are super busy today is why I hadn't had a chance to call him back yet, although it has only been two hours. He was angry and said he couldn't take off work like that, and he was mad that he could not be seen today. I told him that he could go to urgent care if he couldn't come in tomorrow. He said he had an appt with Dr. Fragoso in January and wanted to cancel that.

## 2019-03-04 DIAGNOSIS — I10 ESSENTIAL HYPERTENSION: ICD-10-CM

## 2019-03-04 DIAGNOSIS — E78.5 HYPERLIPIDEMIA: ICD-10-CM

## 2019-03-04 DIAGNOSIS — D64.9 ANEMIA: ICD-10-CM

## 2019-03-04 DIAGNOSIS — K21.9 GASTROESOPHAGEAL REFLUX DISEASE WITHOUT ESOPHAGITIS: ICD-10-CM

## 2019-03-04 DIAGNOSIS — Z12.5 SPECIAL SCREENING EXAMINATION FOR NEOPLASM OF PROSTATE: ICD-10-CM

## 2019-03-04 RX ORDER — AMLODIPINE BESYLATE 10 MG/1
TABLET ORAL
Qty: 90 TABLET | Refills: 0 | Status: SHIPPED | OUTPATIENT
Start: 2019-03-04 | End: 2019-06-05 | Stop reason: SDUPTHER

## 2019-03-04 RX ORDER — LOSARTAN POTASSIUM 50 MG/1
TABLET ORAL
Qty: 135 TABLET | Refills: 0 | Status: SHIPPED | OUTPATIENT
Start: 2019-03-04 | End: 2019-06-05 | Stop reason: SDUPTHER

## 2019-06-05 DIAGNOSIS — K21.9 GASTROESOPHAGEAL REFLUX DISEASE WITHOUT ESOPHAGITIS: ICD-10-CM

## 2019-06-05 DIAGNOSIS — Z12.5 SPECIAL SCREENING EXAMINATION FOR NEOPLASM OF PROSTATE: ICD-10-CM

## 2019-06-05 DIAGNOSIS — D64.9 ANEMIA: ICD-10-CM

## 2019-06-05 DIAGNOSIS — I10 ESSENTIAL HYPERTENSION: ICD-10-CM

## 2019-06-05 DIAGNOSIS — E78.5 HYPERLIPIDEMIA: ICD-10-CM

## 2019-06-05 RX ORDER — AMLODIPINE BESYLATE 10 MG/1
TABLET ORAL
Qty: 90 TABLET | Refills: 0 | Status: SHIPPED | OUTPATIENT
Start: 2019-06-05 | End: 2019-09-07 | Stop reason: SDUPTHER

## 2019-06-05 RX ORDER — LOSARTAN POTASSIUM 50 MG/1
TABLET ORAL
Qty: 135 TABLET | Refills: 0 | Status: SHIPPED | OUTPATIENT
Start: 2019-06-05 | End: 2019-09-09 | Stop reason: SDUPTHER

## 2019-09-07 DIAGNOSIS — D64.9 ANEMIA: ICD-10-CM

## 2019-09-07 DIAGNOSIS — I10 ESSENTIAL HYPERTENSION: ICD-10-CM

## 2019-09-07 DIAGNOSIS — K21.9 GASTROESOPHAGEAL REFLUX DISEASE WITHOUT ESOPHAGITIS: ICD-10-CM

## 2019-09-07 DIAGNOSIS — E78.5 HYPERLIPIDEMIA: ICD-10-CM

## 2019-09-07 DIAGNOSIS — Z12.5 SPECIAL SCREENING EXAMINATION FOR NEOPLASM OF PROSTATE: ICD-10-CM

## 2019-09-07 RX ORDER — AMLODIPINE BESYLATE 10 MG/1
TABLET ORAL
Qty: 90 TABLET | Refills: 0 | Status: SHIPPED | OUTPATIENT
Start: 2019-09-07 | End: 2021-05-18 | Stop reason: SDUPTHER

## 2019-09-09 RX ORDER — LOSARTAN POTASSIUM 50 MG/1
TABLET ORAL
Qty: 135 TABLET | Refills: 0 | Status: SHIPPED | OUTPATIENT
Start: 2019-09-09 | End: 2021-05-18 | Stop reason: SDUPTHER

## 2020-06-03 ENCOUNTER — OFFICE VISIT (OUTPATIENT)
Dept: FAMILY MEDICINE CLINIC | Facility: CLINIC | Age: 65
End: 2020-06-03

## 2020-06-03 VITALS
DIASTOLIC BLOOD PRESSURE: 70 MMHG | TEMPERATURE: 98.9 F | SYSTOLIC BLOOD PRESSURE: 130 MMHG | HEART RATE: 56 BPM | OXYGEN SATURATION: 96 % | RESPIRATION RATE: 20 BRPM | BODY MASS INDEX: 38.73 KG/M2 | WEIGHT: 241 LBS | HEIGHT: 66 IN

## 2020-06-03 DIAGNOSIS — I10 ESSENTIAL HYPERTENSION: Primary | ICD-10-CM

## 2020-06-03 DIAGNOSIS — M79.89 LEG SWELLING: ICD-10-CM

## 2020-06-03 DIAGNOSIS — E78.5 HYPERLIPIDEMIA, UNSPECIFIED HYPERLIPIDEMIA TYPE: ICD-10-CM

## 2020-06-03 DIAGNOSIS — E11.9 TYPE 2 DIABETES MELLITUS WITHOUT COMPLICATION, WITHOUT LONG-TERM CURRENT USE OF INSULIN (HCC): ICD-10-CM

## 2020-06-03 LAB
ALBUMIN SERPL-MCNC: 4.2 G/DL (ref 3.5–5.2)
ALBUMIN UR-MCNC: 20 MG/L (ref 0–20)
ALBUMIN/GLOB SERPL: 1.4 G/DL
ALP SERPL-CCNC: 59 U/L (ref 39–117)
ALT SERPL W P-5'-P-CCNC: 50 U/L (ref 1–41)
ANION GAP SERPL CALCULATED.3IONS-SCNC: 12.5 MMOL/L (ref 5–15)
AST SERPL-CCNC: 30 U/L (ref 1–40)
BILIRUB SERPL-MCNC: 0.4 MG/DL (ref 0.2–1.2)
BUN BLD-MCNC: 14 MG/DL (ref 8–23)
BUN/CREAT SERPL: 19.4 (ref 7–25)
CALCIUM SPEC-SCNC: 9.5 MG/DL (ref 8.6–10.5)
CHLORIDE SERPL-SCNC: 100 MMOL/L (ref 98–107)
CHOLEST SERPL-MCNC: 157 MG/DL (ref 0–200)
CO2 SERPL-SCNC: 22.5 MMOL/L (ref 22–29)
CREAT BLD-MCNC: 0.72 MG/DL (ref 0.76–1.27)
ERYTHROCYTE [DISTWIDTH] IN BLOOD BY AUTOMATED COUNT: 13.1 % (ref 12.3–15.4)
GFR SERPL CREATININE-BSD FRML MDRD: 111 ML/MIN/1.73
GLOBULIN UR ELPH-MCNC: 3 GM/DL
GLUCOSE BLD-MCNC: 177 MG/DL (ref 65–99)
HBA1C MFR BLD: 7.25 % (ref 4.8–5.6)
HCT VFR BLD AUTO: 45.8 % (ref 37.5–51)
HDLC SERPL-MCNC: 35 MG/DL (ref 40–60)
HGB BLD-MCNC: 14.9 G/DL (ref 13–17.7)
LDLC SERPL CALC-MCNC: 86 MG/DL (ref 0–100)
LDLC/HDLC SERPL: 2.46 {RATIO}
LYMPHOCYTES # BLD AUTO: 2.2 10*3/MM3 (ref 0.7–3.1)
LYMPHOCYTES NFR BLD AUTO: 33.4 % (ref 19.6–45.3)
MCH RBC QN AUTO: 31 PG (ref 26.6–33)
MCHC RBC AUTO-ENTMCNC: 32.5 G/DL (ref 31.5–35.7)
MCV RBC AUTO: 95.5 FL (ref 79–97)
MONOCYTES # BLD AUTO: 0.7 10*3/MM3 (ref 0.1–0.9)
MONOCYTES NFR BLD AUTO: 10.6 % (ref 5–12)
NEUTROPHILS # BLD AUTO: 3.8 10*3/MM3 (ref 1.7–7)
NEUTROPHILS NFR BLD AUTO: 56 % (ref 42.7–76)
PLATELET # BLD AUTO: 263 10*3/MM3 (ref 140–450)
PMV BLD AUTO: 7.5 FL (ref 6–12)
POTASSIUM BLD-SCNC: 4.5 MMOL/L (ref 3.5–5.2)
PROT SERPL-MCNC: 7.2 G/DL (ref 6–8.5)
RBC # BLD AUTO: 4.8 10*6/MM3 (ref 4.14–5.8)
SODIUM BLD-SCNC: 135 MMOL/L (ref 136–145)
TRIGL SERPL-MCNC: 180 MG/DL (ref 0–150)
VLDLC SERPL-MCNC: 36 MG/DL (ref 5–40)
WBC NRBC COR # BLD: 6.7 10*3/MM3 (ref 3.4–10.8)

## 2020-06-03 PROCEDURE — 82043 UR ALBUMIN QUANTITATIVE: CPT | Performed by: FAMILY MEDICINE

## 2020-06-03 PROCEDURE — 80061 LIPID PANEL: CPT | Performed by: FAMILY MEDICINE

## 2020-06-03 PROCEDURE — 36415 COLL VENOUS BLD VENIPUNCTURE: CPT | Performed by: FAMILY MEDICINE

## 2020-06-03 PROCEDURE — 99214 OFFICE O/P EST MOD 30 MIN: CPT | Performed by: FAMILY MEDICINE

## 2020-06-03 PROCEDURE — 85025 COMPLETE CBC W/AUTO DIFF WBC: CPT | Performed by: FAMILY MEDICINE

## 2020-06-03 PROCEDURE — 80053 COMPREHEN METABOLIC PANEL: CPT | Performed by: FAMILY MEDICINE

## 2020-06-03 PROCEDURE — 83036 HEMOGLOBIN GLYCOSYLATED A1C: CPT | Performed by: FAMILY MEDICINE

## 2020-06-03 RX ORDER — KETOCONAZOLE 20 MG/G
CREAM TOPICAL
Status: ON HOLD | COMMUNITY
Start: 2020-05-30 | End: 2022-08-30

## 2020-06-03 RX ORDER — DESOXIMETASONE 0.5 MG/G
1 CREAM TOPICAL AS NEEDED
COMMUNITY
Start: 2020-06-01

## 2020-06-03 RX ORDER — HYDROXYZINE HYDROCHLORIDE 25 MG/1
TABLET, FILM COATED ORAL
COMMUNITY
Start: 2020-05-30 | End: 2022-05-02

## 2020-06-03 NOTE — PROGRESS NOTES
SUBJECTIVE:  The patient is a 64-year-old male.  He has diabetes.  He has not had labs in quite some time.  He is controlled with diet only currently.  He complains of arthritis.  He saw a dermatologist and was diagnosed with tinea #1.  He has had some swelling in his right leg for the past 6 months that is intermittent.  It started when he went on a mission trip to [place] in January.  It goes down in the evening.  When he wakes up it develops during the day.  No chest pain or shortness of breath.  No fever chills    PAST MEDICAL HISTORY:  Reviewed.    REVIEW OF SYSTEMS:  Please see above; all others reviewed and are negative.      OBJECTIVE:   Vitals signs are reviewed and are stable.    General:  Well-nourished.  Alert and oriented x3 in no acute distress.  HEENT: PERRLA.   Neck:  Supple.   Lungs:  Clear.    Heart:  Regular rate and rhythm.   Abdomen:   Soft, nontender.   Extremities: 2+ edema right lower extremity 1+ left lower extremity  Neurological:  Grossly intact without motor or sensory deficits.     ASSESSMENT:    Type 2 diabetes  Venous insufficiency  Obesity  Arthritis  Localized edema  PLAN: CMP fasting lipid urine for microalbuminuria hemoglobin A1c CMP ordered.  Discussed healthy lifestyle.  Venous Doppler will be scheduled for both extremities.  Patient will follow-up on labs.  He is advised to go to emergency room if any problems.    Dictated utilizing Dragon dictation.

## 2020-06-11 ENCOUNTER — HOSPITAL ENCOUNTER (OUTPATIENT)
Dept: CARDIOLOGY | Facility: HOSPITAL | Age: 65
Discharge: HOME | End: 2020-06-11
Admitting: FAMILY MEDICINE

## 2020-06-11 DIAGNOSIS — E11.9 TYPE 2 DIABETES MELLITUS WITHOUT COMPLICATION, WITHOUT LONG-TERM CURRENT USE OF INSULIN (HCC): ICD-10-CM

## 2020-06-11 DIAGNOSIS — I10 ESSENTIAL HYPERTENSION: ICD-10-CM

## 2020-06-11 DIAGNOSIS — M79.89 LEG SWELLING: ICD-10-CM

## 2020-06-11 DIAGNOSIS — E78.5 HYPERLIPIDEMIA, UNSPECIFIED HYPERLIPIDEMIA TYPE: ICD-10-CM

## 2020-06-11 LAB

## 2020-06-11 PROCEDURE — 93970 EXTREMITY STUDY: CPT

## 2020-06-16 ENCOUNTER — TELEPHONE (OUTPATIENT)
Dept: FAMILY MEDICINE CLINIC | Facility: CLINIC | Age: 65
End: 2020-06-16

## 2020-06-16 NOTE — TELEPHONE ENCOUNTER
PATIENT CALLING IN TO FOLLOW UP ON THE Venous Doppler HE GOT ON 6/3, AND THE TEST ON 6/11.  THEY SHOWED NO CLOTS AND HE HAS BEEN WAITING ON DR CÁRDENAS TO TELL HIM NEXT STEPS    PLEASE CALL AND ADVISE [phone]

## 2020-06-17 NOTE — TELEPHONE ENCOUNTER
I am not sure what else to offer except try different material from the previous 1 to see if that works.

## 2020-08-18 ENCOUNTER — TELEPHONE (OUTPATIENT)
Dept: FAMILY MEDICINE CLINIC | Facility: CLINIC | Age: 65
End: 2020-08-18

## 2020-08-18 NOTE — TELEPHONE ENCOUNTER
PT HAS APPT THIS AFTERNOON, WAS ASKED TO COME IN EARLY, CURRENTLY IN STAND STILL TRAFFIC AND WANTS TO SPEAK WITH [facility] TO DETERMINE WHETHER HE IS OK TO HOLD OFF ON APPT UNTIL HIS NEXT ONE SCHEDULED IN OCTOBER?

## 2020-08-19 ENCOUNTER — OFFICE VISIT (OUTPATIENT)
Dept: FAMILY MEDICINE CLINIC | Facility: CLINIC | Age: 65
End: 2020-08-19

## 2020-08-19 VITALS
TEMPERATURE: 97.7 F | SYSTOLIC BLOOD PRESSURE: 120 MMHG | DIASTOLIC BLOOD PRESSURE: 70 MMHG | BODY MASS INDEX: 37.45 KG/M2 | RESPIRATION RATE: 20 BRPM | HEART RATE: 64 BPM | HEIGHT: 66 IN | OXYGEN SATURATION: 97 % | WEIGHT: 233 LBS

## 2020-08-19 DIAGNOSIS — E11.65 TYPE 2 DIABETES MELLITUS WITH HYPERGLYCEMIA, WITHOUT LONG-TERM CURRENT USE OF INSULIN (HCC): ICD-10-CM

## 2020-08-19 DIAGNOSIS — R35.0 URINARY FREQUENCY: Primary | ICD-10-CM

## 2020-08-19 LAB — GLUCOSE BLDC GLUCOMTR-MCNC: 278 MG/DL (ref 70–130)

## 2020-08-19 PROCEDURE — 99213 OFFICE O/P EST LOW 20 MIN: CPT | Performed by: FAMILY MEDICINE

## 2020-08-19 PROCEDURE — 82962 GLUCOSE BLOOD TEST: CPT | Performed by: FAMILY MEDICINE

## 2020-08-19 RX ORDER — LANCETS 30 GAUGE
EACH MISCELLANEOUS
Qty: 100 EACH | Refills: 12 | Status: SHIPPED | OUTPATIENT
Start: 2020-08-19 | End: 2020-09-23 | Stop reason: SDUPTHER

## 2020-08-19 RX ORDER — BLOOD-GLUCOSE METER
1 KIT MISCELLANEOUS AS NEEDED
Qty: 1 EACH | Refills: 1 | Status: SHIPPED | OUTPATIENT
Start: 2020-08-19 | End: 2021-06-28 | Stop reason: SDUPTHER

## 2020-08-19 RX ORDER — METFORMIN HYDROCHLORIDE 750 MG/1
750 TABLET, EXTENDED RELEASE ORAL
COMMUNITY
End: 2020-08-21 | Stop reason: SDUPTHER

## 2020-08-19 NOTE — PROGRESS NOTES
"SUBJECTIVE:  The patient is a 65-year-old white male.  He has diabetes.  This is been previously controlled with diet and exercise.  He complains of frequent urination and excessive thirst.  2 months ago he had a hemoglobin A1c of 7.2.  He has been on metformin 750 mg.  He has not taken his blood sugar.  Today it is 274.    PAST MEDICAL HISTORY:  Reviewed.    REVIEW OF SYSTEMS:  Please see above.  All others reviewed and are negative.      OBJECTIVE:   /70 (BP Location: Left arm, Patient Position: Sitting)   Pulse 64   Temp 97.7 °F (36.5 °C) (Infrared)   Resp 20   Ht 167.6 cm (66\")   Wt 106 kg (233 lb)   SpO2 97%   BMI 37.61 kg/m²    Vitals signs are reviewed and are stable.    General:  Well-nourished.  Alert and oriented x3 in no acute distress.  HEENT: PERRLA.   Neck:  Supple.   Lungs:  Clear.    Heart:  Regular rate and rhythm.   Abdomen:   Soft, nontender.   Extremities:  No cyanosis, clubbing or edema.   Neurological:  Grossly intact without motor or sensory deficits.     ASSESSMENT:      Bob was seen today for 4 month check up and excessive urination/thirst.    Diagnoses and all orders for this visit:    Urinary frequency  -     POCT occult blood x 1 stool    Type 2 diabetes mellitus with hyperglycemia, without long-term current use of insulin (CMS/[facility])  -     POCT occult blood x 1 stool         PLAN: We had a very long discussion about the situation he is in.  [place].  I told him he was not doing A1c till next month.  I advised him he should double up on his metformin to 1500 mg a day.  He will check his blood sugars closely.  I advised him to follow-up in a month but he wants to wait till October.  I told him he must call me over the next week or 2 and let me know what his blood sugars are running.  I told him he should go to emergency room should he have any problems.  Advised on healthy lifestyle.    Dictated utilizing Dragon dictation.    "

## 2020-08-21 ENCOUNTER — TELEPHONE (OUTPATIENT)
Dept: FAMILY MEDICINE CLINIC | Facility: CLINIC | Age: 65
End: 2020-08-21

## 2020-08-21 RX ORDER — METFORMIN HYDROCHLORIDE 750 MG/1
TABLET, EXTENDED RELEASE ORAL
Qty: 60 TABLET | Refills: 0 | Status: SHIPPED | OUTPATIENT
Start: 2020-08-21 | End: 2020-08-21

## 2020-08-21 RX ORDER — METFORMIN HYDROCHLORIDE 750 MG/1
TABLET, EXTENDED RELEASE ORAL
Qty: 180 TABLET | Refills: 3 | Status: SHIPPED | OUTPATIENT
Start: 2020-08-21 | End: 2021-08-11 | Stop reason: SDUPTHER

## 2020-08-21 NOTE — TELEPHONE ENCOUNTER
Spouse called in stating the patient was told to double up on his Metformin, he's almost out and would like to get a re-fill. She also said the doctor changed the dosage to 1500 MG    [facility] [address] [facility] [place] confirmed    Best call back # [phone]

## 2020-08-27 ENCOUNTER — TELEPHONE (OUTPATIENT)
Dept: FAMILY MEDICINE CLINIC | Facility: CLINIC | Age: 65
End: 2020-08-27

## 2020-08-27 RX ORDER — METFORMIN HYDROCHLORIDE 500 MG/1
500 TABLET, EXTENDED RELEASE ORAL
Qty: 30 TABLET | Refills: 6 | Status: SHIPPED | OUTPATIENT
Start: 2020-08-27 | End: 2021-08-11 | Stop reason: SDUPTHER

## 2020-08-27 NOTE — TELEPHONE ENCOUNTER
Increase your Metformin from 1500 mg to 2000 mg and see if this improves.  Let us know what it is running next week.  Call if problems.

## 2020-08-27 NOTE — TELEPHONE ENCOUNTER
PATIENT WAS CALLING TO LET THE NURSE KNOW THAT HIS BLOOD SUGARS HAVE BEEN RUNNING IN 'S ALL WEEK AND HE WAS INSISTING ON SPEAKING TO THE NURSE LIVE.     HUB EXPLAINED NURSE WAS WITH PATIENTS IN CLINIC HE INSISTED AGAIN TO SPEAK TO THE NURSE. PATIENT WAS WARM TRANSFERRED INTO THE CLINIC.     PATIENT CALL BACK NUMBER [phone]

## 2020-09-02 ENCOUNTER — TELEPHONE (OUTPATIENT)
Dept: FAMILY MEDICINE CLINIC | Facility: CLINIC | Age: 65
End: 2020-09-02

## 2020-09-02 NOTE — TELEPHONE ENCOUNTER
today and yesterday 275 the 2 days before. He has been taking Metformin 2000mg day x 1 week.Per verbal order of Dr Fragoso add Januvia 1000mg daily samples up front.Call in bs readings in 1 week.

## 2020-09-08 ENCOUNTER — TELEPHONE (OUTPATIENT)
Dept: FAMILY MEDICINE CLINIC | Facility: CLINIC | Age: 65
End: 2020-09-08

## 2020-09-08 NOTE — TELEPHONE ENCOUNTER
PATIENT WOULD LIKE CALL BACK FROM [facility] RE: BS READINGS    DECLINED TO PROVIDE CURRENT NUMBERS    [phone]

## 2020-09-08 NOTE — TELEPHONE ENCOUNTER
Patient called back and stated his blood sugar readings are bouncing around the 200's with the highest being 222 this morning     Please advise   [phone]

## 2020-09-09 RX ORDER — GLIMEPIRIDE 1 MG/1
1 TABLET ORAL
Qty: 90 TABLET | Refills: 2 | Status: SHIPPED | OUTPATIENT
Start: 2020-09-09 | End: 2021-05-18 | Stop reason: DRUGHIGH

## 2020-09-23 ENCOUNTER — OFFICE VISIT (OUTPATIENT)
Dept: FAMILY MEDICINE CLINIC | Facility: CLINIC | Age: 65
End: 2020-09-23

## 2020-09-23 VITALS
HEIGHT: 66 IN | WEIGHT: 231.2 LBS | DIASTOLIC BLOOD PRESSURE: 68 MMHG | BODY MASS INDEX: 37.16 KG/M2 | OXYGEN SATURATION: 97 % | HEART RATE: 71 BPM | SYSTOLIC BLOOD PRESSURE: 138 MMHG | TEMPERATURE: 97.7 F

## 2020-09-23 DIAGNOSIS — Z79.4 TYPE 2 DIABETES MELLITUS WITHOUT COMPLICATION, WITH LONG-TERM CURRENT USE OF INSULIN (HCC): Primary | ICD-10-CM

## 2020-09-23 DIAGNOSIS — E11.9 TYPE 2 DIABETES MELLITUS WITHOUT COMPLICATION, WITH LONG-TERM CURRENT USE OF INSULIN (HCC): Primary | ICD-10-CM

## 2020-09-23 PROCEDURE — 99213 OFFICE O/P EST LOW 20 MIN: CPT | Performed by: FAMILY MEDICINE

## 2020-09-23 RX ORDER — LANCETS 30 GAUGE
EACH MISCELLANEOUS
Qty: 100 EACH | Refills: 12 | Status: SHIPPED | OUTPATIENT
Start: 2020-09-23 | End: 2020-11-03 | Stop reason: SDUPTHER

## 2020-11-03 ENCOUNTER — TELEPHONE (OUTPATIENT)
Dept: FAMILY MEDICINE CLINIC | Facility: CLINIC | Age: 65
End: 2020-11-03

## 2020-11-03 RX ORDER — LANCETS 30 GAUGE
EACH MISCELLANEOUS
Qty: 200 EACH | Refills: 3 | Status: SHIPPED | OUTPATIENT
Start: 2020-11-03 | End: 2022-11-15 | Stop reason: SDUPTHER

## 2020-11-03 NOTE — TELEPHONE ENCOUNTER
He needs a new Rx for Metformin 1000 bid sent to [facility], his bs is doing well with Metformin and Glimiperide

## 2020-11-03 NOTE — TELEPHONE ENCOUNTER
PATIENT IS CALLING NEEDING A CALL BACK FROM [facility]    PATIENT IS NEEDING TO DISCUSS HIS DIABETES MEDICATION      PLEASE CONTACT PATIENT @[phone]

## 2020-12-23 ENCOUNTER — OFFICE VISIT (OUTPATIENT)
Dept: FAMILY MEDICINE CLINIC | Facility: CLINIC | Age: 65
End: 2020-12-23

## 2020-12-23 VITALS
WEIGHT: 228 LBS | OXYGEN SATURATION: 98 % | HEIGHT: 66 IN | BODY MASS INDEX: 36.64 KG/M2 | DIASTOLIC BLOOD PRESSURE: 70 MMHG | SYSTOLIC BLOOD PRESSURE: 140 MMHG | TEMPERATURE: 98.7 F | RESPIRATION RATE: 18 BRPM | HEART RATE: 69 BPM

## 2020-12-23 DIAGNOSIS — E11.9 TYPE 2 DIABETES MELLITUS WITHOUT COMPLICATION, WITH LONG-TERM CURRENT USE OF INSULIN (HCC): Primary | ICD-10-CM

## 2020-12-23 DIAGNOSIS — Z12.5 SPECIAL SCREENING FOR MALIGNANT NEOPLASM OF PROSTATE: ICD-10-CM

## 2020-12-23 DIAGNOSIS — Z79.4 TYPE 2 DIABETES MELLITUS WITHOUT COMPLICATION, WITH LONG-TERM CURRENT USE OF INSULIN (HCC): Primary | ICD-10-CM

## 2020-12-23 DIAGNOSIS — I10 ESSENTIAL HYPERTENSION: ICD-10-CM

## 2020-12-23 LAB
ALBUMIN SERPL-MCNC: 4.3 G/DL (ref 3.5–5.2)
ALBUMIN/GLOB SERPL: 1.8 G/DL
ALP SERPL-CCNC: 84 U/L (ref 39–117)
ALT SERPL W P-5'-P-CCNC: 54 U/L (ref 1–41)
ANION GAP SERPL CALCULATED.3IONS-SCNC: 13.2 MMOL/L (ref 5–15)
AST SERPL-CCNC: 20 U/L (ref 1–40)
BILIRUB SERPL-MCNC: 0.4 MG/DL (ref 0–1.2)
BUN SERPL-MCNC: 18 MG/DL (ref 8–23)
BUN/CREAT SERPL: 23.7 (ref 7–25)
CALCIUM SPEC-SCNC: 9.3 MG/DL (ref 8.6–10.5)
CHLORIDE SERPL-SCNC: 105 MMOL/L (ref 98–107)
CHOLEST SERPL-MCNC: 149 MG/DL (ref 0–200)
CO2 SERPL-SCNC: 20.8 MMOL/L (ref 22–29)
CREAT SERPL-MCNC: 0.76 MG/DL (ref 0.76–1.27)
ERYTHROCYTE [DISTWIDTH] IN BLOOD BY AUTOMATED COUNT: 12.5 % (ref 12.3–15.4)
GFR SERPL CREATININE-BSD FRML MDRD: 104 ML/MIN/1.73
GLOBULIN UR ELPH-MCNC: 2.4 GM/DL
GLUCOSE SERPL-MCNC: 123 MG/DL (ref 65–99)
HBA1C MFR BLD: 6 % (ref 4.8–5.6)
HCT VFR BLD AUTO: 41.2 % (ref 37.5–51)
HDLC SERPL-MCNC: 31 MG/DL (ref 40–60)
HGB BLD-MCNC: 13.5 G/DL (ref 13–17.7)
LDLC SERPL CALC-MCNC: 92 MG/DL (ref 0–100)
LDLC/HDLC SERPL: 2.88 {RATIO}
LYMPHOCYTES # BLD AUTO: 2 10*3/MM3 (ref 0.7–3.1)
LYMPHOCYTES NFR BLD AUTO: 28.8 % (ref 19.6–45.3)
MCH RBC QN AUTO: 31.2 PG (ref 26.6–33)
MCHC RBC AUTO-ENTMCNC: 32.9 G/DL (ref 31.5–35.7)
MCV RBC AUTO: 94.8 FL (ref 79–97)
MONOCYTES # BLD AUTO: 0.4 10*3/MM3 (ref 0.1–0.9)
MONOCYTES NFR BLD AUTO: 6 % (ref 5–12)
NEUTROPHILS NFR BLD AUTO: 4.6 10*3/MM3 (ref 1.7–7)
NEUTROPHILS NFR BLD AUTO: 65.2 % (ref 42.7–76)
PLATELET # BLD AUTO: 258 10*3/MM3 (ref 140–450)
PMV BLD AUTO: 8.4 FL (ref 6–12)
POTASSIUM SERPL-SCNC: 4.6 MMOL/L (ref 3.5–5.2)
PROT SERPL-MCNC: 6.7 G/DL (ref 6–8.5)
PSA SERPL-MCNC: 0.25 NG/ML (ref 0–4)
RBC # BLD AUTO: 4.34 10*6/MM3 (ref 4.14–5.8)
SODIUM SERPL-SCNC: 139 MMOL/L (ref 136–145)
TRIGL SERPL-MCNC: 143 MG/DL (ref 0–150)
VLDLC SERPL-MCNC: 26 MG/DL (ref 5–40)
WBC # BLD AUTO: 7 10*3/MM3 (ref 3.4–10.8)

## 2020-12-23 PROCEDURE — G0103 PSA SCREENING: HCPCS | Performed by: FAMILY MEDICINE

## 2020-12-23 PROCEDURE — 83036 HEMOGLOBIN GLYCOSYLATED A1C: CPT | Performed by: FAMILY MEDICINE

## 2020-12-23 PROCEDURE — 80053 COMPREHEN METABOLIC PANEL: CPT | Performed by: FAMILY MEDICINE

## 2020-12-23 PROCEDURE — 36415 COLL VENOUS BLD VENIPUNCTURE: CPT | Performed by: FAMILY MEDICINE

## 2020-12-23 PROCEDURE — 85025 COMPLETE CBC W/AUTO DIFF WBC: CPT | Performed by: FAMILY MEDICINE

## 2020-12-23 PROCEDURE — 80061 LIPID PANEL: CPT | Performed by: FAMILY MEDICINE

## 2020-12-23 PROCEDURE — 99213 OFFICE O/P EST LOW 20 MIN: CPT | Performed by: FAMILY MEDICINE

## 2020-12-23 NOTE — PROGRESS NOTES
"SUBJECTIVE:  The patient is a 65-ye he is doing well.  He brings in his readings today and they are much improved.  Ar-old white male.  He has diabetes.  He feels good.  He has been taking BIOflex for his sore knees and this is helped.    PAST MEDICAL HISTORY:  Reviewed.    REVIEW OF SYSTEMS:  Please see above.  All others reviewed and are negative.      OBJECTIVE:   /70 (BP Location: Left arm, Patient Position: Sitting)   Pulse 69   Temp 98.7 °F (37.1 °C) (Infrared)   Resp 18   Ht 167.6 cm (66\")   Wt 103 kg (228 lb)   SpO2 98%   BMI 36.80 kg/m²    Vitals signs are reviewed and are stable.    General:  Well-nourished.  Alert and oriented x3 in no acute distress.  HEENT: PERRLA.   Neck:  Supple.   Lungs:  Clear.    Heart:  Regular rate and rhythm.   Abdomen:   Soft, nontender.   Extremities:  No cyanosis, clubbing or edema.   Neurological:  Grossly intact without motor or sensory deficits.     ASSESSMENT:      Diagnoses and all orders for this visit:    1. Type 2 diabetes mellitus without complication, with long-term current use of insulin (CMS/[facility]) (Primary)  -     CBC & Differential  -     Comprehensive Metabolic Panel  -     Hemoglobin A1c  -     Lipid Panel    2. Essential hypertension  -     CBC & Differential  -     Comprehensive Metabolic Panel  -     Hemoglobin A1c  -     Lipid Panel         PLAN: Healthy lifestyle discussed.  Continue your current regimen.  Follow-up on labs.  Call if problems.    Dictated utilizing Dragon dictation.    "

## 2021-03-19 ENCOUNTER — BULK ORDERING (OUTPATIENT)
Dept: CASE MANAGEMENT | Facility: OTHER | Age: 66
End: 2021-03-19

## 2021-03-19 DIAGNOSIS — Z23 IMMUNIZATION DUE: ICD-10-CM

## 2021-03-26 ENCOUNTER — IMMUNIZATION (OUTPATIENT)
Dept: VACCINE CLINIC | Facility: HOSPITAL | Age: 66
End: 2021-03-26

## 2021-03-26 DIAGNOSIS — Z23 IMMUNIZATION DUE: ICD-10-CM

## 2021-03-26 PROCEDURE — 91300 HC SARSCOV02 VAC 30MCG/0.3ML IM: CPT | Performed by: INTERNAL MEDICINE

## 2021-03-26 PROCEDURE — 0001A: CPT | Performed by: INTERNAL MEDICINE

## 2021-04-16 ENCOUNTER — IMMUNIZATION (OUTPATIENT)
Dept: VACCINE CLINIC | Facility: HOSPITAL | Age: 66
End: 2021-04-16

## 2021-04-16 PROCEDURE — 0002A: CPT | Performed by: INTERNAL MEDICINE

## 2021-04-16 PROCEDURE — 91300 HC SARSCOV02 VAC 30MCG/0.3ML IM: CPT | Performed by: INTERNAL MEDICINE

## 2021-04-28 ENCOUNTER — OFFICE VISIT (OUTPATIENT)
Dept: FAMILY MEDICINE CLINIC | Facility: CLINIC | Age: 66
End: 2021-04-28

## 2021-04-28 VITALS
RESPIRATION RATE: 20 BRPM | DIASTOLIC BLOOD PRESSURE: 70 MMHG | WEIGHT: 234 LBS | HEIGHT: 66 IN | BODY MASS INDEX: 37.61 KG/M2 | HEART RATE: 56 BPM | TEMPERATURE: 97.4 F | OXYGEN SATURATION: 97 % | SYSTOLIC BLOOD PRESSURE: 140 MMHG

## 2021-04-28 DIAGNOSIS — I10 ESSENTIAL HYPERTENSION: ICD-10-CM

## 2021-04-28 DIAGNOSIS — Z79.4 TYPE 2 DIABETES MELLITUS WITHOUT COMPLICATION, WITH LONG-TERM CURRENT USE OF INSULIN (HCC): Primary | ICD-10-CM

## 2021-04-28 DIAGNOSIS — E11.9 TYPE 2 DIABETES MELLITUS WITHOUT COMPLICATION, WITH LONG-TERM CURRENT USE OF INSULIN (HCC): Primary | ICD-10-CM

## 2021-04-28 PROCEDURE — 99213 OFFICE O/P EST LOW 20 MIN: CPT | Performed by: FAMILY MEDICINE

## 2021-04-28 RX ORDER — ROPINIROLE 0.5 MG/1
0.5 TABLET, FILM COATED ORAL NIGHTLY
COMMUNITY
Start: 2021-04-11

## 2021-04-28 NOTE — PROGRESS NOTES
"SUBJECTIVE:  The patient is a 65-year-old white male.  He has diabetes.  He has hypertension.  He is doing fairly well.  Is under a little bit of stress and that his wife has a colectomy scheduled tomorrow.  His blood sugars have been running a bit high he feels because of the stress.  He brings in a list of his blood pressure readings for me.    PAST MEDICAL HISTORY:  Reviewed.    REVIEW OF SYSTEMS:  Please see above.  All others reviewed and are negative.      OBJECTIVE:   /70 (BP Location: Left arm, Patient Position: Sitting)   Pulse 56   Temp 97.4 °F (36.3 °C) (Infrared)   Resp 20   Ht 167.6 cm (66\")   Wt 106 kg (234 lb)   SpO2 97%   BMI 37.77 kg/m²    Vitals signs are reviewed and are stable.    General:  Well-nourished.  Alert and oriented x3 in no acute distress.  HEENT: PERRLA.   Neck:  Supple.   Lungs:  Clear.    Heart:  Regular rate and rhythm.   Abdomen:   Soft, nontender.   Extremities:  No cyanosis, clubbing or edema.   Neurological:  Grossly intact without motor or sensory deficits.     ASSESSMENT:      Diagnoses and all orders for this visit:    1. Type 2 diabetes mellitus without complication, with long-term current use of insulin (CMS/[facility]) (Primary)  -     CBC & Differential  -     Comprehensive Metabolic Panel  -     Hemoglobin A1c  -     Lipid Panel    2. Essential hypertension         PLAN: See above labs.  We discussed healthy lifestyle.  We discussed importance of exercise and proper food intake.  He will follow-up on labs.  He will call if problems.    Dictated utilizing Dragon dictation.    "

## 2021-05-18 DIAGNOSIS — K21.9 GASTROESOPHAGEAL REFLUX DISEASE WITHOUT ESOPHAGITIS: ICD-10-CM

## 2021-05-18 DIAGNOSIS — Z12.5 SPECIAL SCREENING EXAMINATION FOR NEOPLASM OF PROSTATE: ICD-10-CM

## 2021-05-18 DIAGNOSIS — I10 ESSENTIAL HYPERTENSION: ICD-10-CM

## 2021-05-18 DIAGNOSIS — D64.9 ANEMIA: ICD-10-CM

## 2021-05-18 DIAGNOSIS — E78.5 HYPERLIPIDEMIA: ICD-10-CM

## 2021-05-18 RX ORDER — LOSARTAN POTASSIUM 50 MG/1
75 TABLET ORAL DAILY
Qty: 135 TABLET | Refills: 0 | Status: SHIPPED | OUTPATIENT
Start: 2021-05-18 | End: 2021-09-13

## 2021-05-18 RX ORDER — AMLODIPINE BESYLATE 10 MG/1
10 TABLET ORAL DAILY
Qty: 90 TABLET | Refills: 0 | Status: SHIPPED | OUTPATIENT
Start: 2021-05-18 | End: 2021-09-13

## 2021-05-18 RX ORDER — GLIMEPIRIDE 2 MG/1
2 TABLET ORAL
Qty: 90 TABLET | Refills: 0 | Status: SHIPPED | OUTPATIENT
Start: 2021-05-18 | End: 2021-08-11 | Stop reason: SDUPTHER

## 2021-06-28 ENCOUNTER — TELEPHONE (OUTPATIENT)
Dept: FAMILY MEDICINE CLINIC | Facility: CLINIC | Age: 66
End: 2021-06-28

## 2021-06-28 NOTE — TELEPHONE ENCOUNTER
Caller: Bob Escamilla    Relationship: Self    Best call back number: [phone]    Medication needed:   Requested Prescriptions     Pending Prescriptions Disp Refills   • glucose blood test strip 200 each 3     Sig: DX E11.9 check bs bid accu check guide strips   • glucose monitor monitoring kit 1 each 1     Si each As Needed (check bs daily). Dx E11.9 check bs daily/give all supplies best covered by his insurance       When do you need the refill by: ASAP    What additional details did the patient provide when requesting the medication: LOW ON MEDICATION    Does the patient have less than a 3 day supply:  [] Yes  [x] No    What is the patient's preferred pharmacy: [facility] [address] [place] [address] AT [facility] & (STAN RODRIGUEZ) - [phone] [facility] [phone] FX

## 2021-06-29 RX ORDER — BLOOD-GLUCOSE METER
1 KIT MISCELLANEOUS AS NEEDED
Qty: 1 EACH | Refills: 1 | Status: SHIPPED | OUTPATIENT
Start: 2021-06-29

## 2021-08-11 ENCOUNTER — OFFICE VISIT (OUTPATIENT)
Dept: FAMILY MEDICINE CLINIC | Facility: CLINIC | Age: 66
End: 2021-08-11

## 2021-08-11 VITALS
HEIGHT: 66 IN | RESPIRATION RATE: 18 BRPM | BODY MASS INDEX: 37.61 KG/M2 | OXYGEN SATURATION: 97 % | DIASTOLIC BLOOD PRESSURE: 70 MMHG | TEMPERATURE: 97.3 F | HEART RATE: 59 BPM | WEIGHT: 234 LBS | SYSTOLIC BLOOD PRESSURE: 136 MMHG

## 2021-08-11 DIAGNOSIS — E11.9 TYPE 2 DIABETES MELLITUS WITHOUT COMPLICATION, WITH LONG-TERM CURRENT USE OF INSULIN (HCC): Primary | ICD-10-CM

## 2021-08-11 DIAGNOSIS — I10 ESSENTIAL HYPERTENSION: ICD-10-CM

## 2021-08-11 DIAGNOSIS — Z79.4 TYPE 2 DIABETES MELLITUS WITHOUT COMPLICATION, WITH LONG-TERM CURRENT USE OF INSULIN (HCC): Primary | ICD-10-CM

## 2021-08-11 LAB
ALBUMIN SERPL-MCNC: 4.4 G/DL (ref 3.5–5.2)
ALBUMIN/GLOB SERPL: 1.9 G/DL
ALP SERPL-CCNC: 59 U/L (ref 39–117)
ALT SERPL W P-5'-P-CCNC: 31 U/L (ref 1–41)
ANION GAP SERPL CALCULATED.3IONS-SCNC: 11.8 MMOL/L (ref 5–15)
AST SERPL-CCNC: 20 U/L (ref 1–40)
BILIRUB SERPL-MCNC: 0.3 MG/DL (ref 0–1.2)
BUN SERPL-MCNC: 12 MG/DL (ref 8–23)
BUN/CREAT SERPL: 17.1 (ref 7–25)
CALCIUM SPEC-SCNC: 8.9 MG/DL (ref 8.6–10.5)
CHLORIDE SERPL-SCNC: 106 MMOL/L (ref 98–107)
CHOLEST SERPL-MCNC: 138 MG/DL (ref 0–200)
CO2 SERPL-SCNC: 20.2 MMOL/L (ref 22–29)
CREAT SERPL-MCNC: 0.7 MG/DL (ref 0.76–1.27)
ERYTHROCYTE [DISTWIDTH] IN BLOOD BY AUTOMATED COUNT: 12.7 % (ref 12.3–15.4)
GFR SERPL CREATININE-BSD FRML MDRD: 114 ML/MIN/1.73
GLOBULIN UR ELPH-MCNC: 2.3 GM/DL
GLUCOSE SERPL-MCNC: 104 MG/DL (ref 65–99)
HBA1C MFR BLD: 6.52 % (ref 4.8–5.6)
HCT VFR BLD AUTO: 41.8 % (ref 37.5–51)
HDLC SERPL-MCNC: 31 MG/DL (ref 40–60)
HGB BLD-MCNC: 13.9 G/DL (ref 13–17.7)
LDLC SERPL CALC-MCNC: 77 MG/DL (ref 0–100)
LDLC/HDLC SERPL: 2.32 {RATIO}
LYMPHOCYTES # BLD AUTO: 2.4 10*3/MM3 (ref 0.7–3.1)
LYMPHOCYTES NFR BLD AUTO: 36.4 % (ref 19.6–45.3)
MCH RBC QN AUTO: 31.2 PG (ref 26.6–33)
MCHC RBC AUTO-ENTMCNC: 33.2 G/DL (ref 31.5–35.7)
MCV RBC AUTO: 93.9 FL (ref 79–97)
MONOCYTES # BLD AUTO: 0.7 10*3/MM3 (ref 0.1–0.9)
MONOCYTES NFR BLD AUTO: 10.6 % (ref 5–12)
NEUTROPHILS NFR BLD AUTO: 3.5 10*3/MM3 (ref 1.7–7)
NEUTROPHILS NFR BLD AUTO: 53 % (ref 42.7–76)
PLATELET # BLD AUTO: 241 10*3/MM3 (ref 140–450)
PMV BLD AUTO: 7.7 FL (ref 6–12)
POTASSIUM SERPL-SCNC: 4.3 MMOL/L (ref 3.5–5.2)
PROT SERPL-MCNC: 6.7 G/DL (ref 6–8.5)
RBC # BLD AUTO: 4.45 10*6/MM3 (ref 4.14–5.8)
SODIUM SERPL-SCNC: 138 MMOL/L (ref 136–145)
TRIGL SERPL-MCNC: 175 MG/DL (ref 0–150)
VLDLC SERPL-MCNC: 30 MG/DL (ref 5–40)
WBC # BLD AUTO: 6.6 10*3/MM3 (ref 3.4–10.8)

## 2021-08-11 PROCEDURE — 36415 COLL VENOUS BLD VENIPUNCTURE: CPT | Performed by: FAMILY MEDICINE

## 2021-08-11 PROCEDURE — 99213 OFFICE O/P EST LOW 20 MIN: CPT | Performed by: FAMILY MEDICINE

## 2021-08-11 PROCEDURE — 80061 LIPID PANEL: CPT | Performed by: FAMILY MEDICINE

## 2021-08-11 PROCEDURE — 83036 HEMOGLOBIN GLYCOSYLATED A1C: CPT | Performed by: FAMILY MEDICINE

## 2021-08-11 PROCEDURE — 85025 COMPLETE CBC W/AUTO DIFF WBC: CPT | Performed by: FAMILY MEDICINE

## 2021-08-11 PROCEDURE — 80053 COMPREHEN METABOLIC PANEL: CPT | Performed by: FAMILY MEDICINE

## 2021-08-11 RX ORDER — GLIMEPIRIDE 2 MG/1
TABLET ORAL
Qty: 180 TABLET | Refills: 3 | Status: SHIPPED | OUTPATIENT
Start: 2021-08-11 | End: 2021-08-11 | Stop reason: SDUPTHER

## 2021-08-11 RX ORDER — GLIMEPIRIDE 2 MG/1
TABLET ORAL
Qty: 180 TABLET | Refills: 3 | Status: SHIPPED | OUTPATIENT
Start: 2021-08-11 | End: 2021-11-10

## 2021-08-11 NOTE — PROGRESS NOTES
"SUBJECTIVE:  The patient is a 65-year-old white male.  He has diabetes and hypertension.  His wife has been suffering for prolonged illness and hospitalizations.  He has been under more stress.  He feels his blood sugars have been elevated because of this.    PAST MEDICAL HISTORY:  Reviewed.    REVIEW OF SYSTEMS:  Please see above.  All others reviewed and are negative.      OBJECTIVE:   /70 (BP Location: Left arm, Patient Position: Sitting)   Pulse 59   Temp 97.3 °F (36.3 °C) (Infrared)   Resp 18   Ht 167.6 cm (66\")   Wt 106 kg (234 lb)   SpO2 97%   BMI 37.77 kg/m²    Vitals signs are reviewed and are stable.    General:  Well-nourished.  Alert and oriented x3 in no acute distress.  HEENT: PERRLA.   Neck:  Supple.   Lungs:  Clear.    Heart:  Regular rate and rhythm.   Abdomen:   Soft, nontender.   Extremities:  No cyanosis, clubbing or edema.   Neurological:  Grossly intact without motor or sensory deficits.     ASSESSMENT:      Diagnoses and all orders for this visit:    1. Type 2 diabetes mellitus without complication, with long-term current use of insulin (CMS/[facility]) (Primary)  -     CBC & Differential  -     Comprehensive Metabolic Panel  -     Hemoglobin A1c  -     Lipid Panel    2. Essential hypertension  -     CBC & Differential  -     Comprehensive Metabolic Panel  -     Hemoglobin A1c  -     Lipid Panel         PLAN: He is going to increase his glimepiride to 3 mg daily.  He will follow-up on labs.  Discussed healthy lifestyle.  Call if problems.    Dictated utilizing Dragon dictation.    "

## 2021-09-12 DIAGNOSIS — K21.9 GASTROESOPHAGEAL REFLUX DISEASE WITHOUT ESOPHAGITIS: ICD-10-CM

## 2021-09-12 DIAGNOSIS — I10 ESSENTIAL HYPERTENSION: ICD-10-CM

## 2021-09-12 DIAGNOSIS — Z12.5 SPECIAL SCREENING EXAMINATION FOR NEOPLASM OF PROSTATE: ICD-10-CM

## 2021-09-12 DIAGNOSIS — D64.9 ANEMIA: ICD-10-CM

## 2021-09-12 DIAGNOSIS — E78.5 HYPERLIPIDEMIA: ICD-10-CM

## 2021-09-13 RX ORDER — AMLODIPINE BESYLATE 10 MG/1
10 TABLET ORAL DAILY
Qty: 90 TABLET | Refills: 0 | Status: SHIPPED | OUTPATIENT
Start: 2021-09-13 | End: 2021-11-17

## 2021-09-13 RX ORDER — LOSARTAN POTASSIUM 50 MG/1
TABLET ORAL
Qty: 135 TABLET | Refills: 0 | Status: SHIPPED | OUTPATIENT
Start: 2021-09-13 | End: 2021-11-17

## 2021-11-10 RX ORDER — GLIMEPIRIDE 2 MG/1
TABLET ORAL
Qty: 180 TABLET | Refills: 1 | Status: SHIPPED | OUTPATIENT
Start: 2021-11-10 | End: 2022-05-17

## 2021-11-16 DIAGNOSIS — K21.9 GASTROESOPHAGEAL REFLUX DISEASE WITHOUT ESOPHAGITIS: ICD-10-CM

## 2021-11-16 DIAGNOSIS — D64.9 ANEMIA: ICD-10-CM

## 2021-11-16 DIAGNOSIS — E78.5 HYPERLIPIDEMIA: ICD-10-CM

## 2021-11-16 DIAGNOSIS — Z12.5 SPECIAL SCREENING EXAMINATION FOR NEOPLASM OF PROSTATE: ICD-10-CM

## 2021-11-16 DIAGNOSIS — I10 ESSENTIAL HYPERTENSION: ICD-10-CM

## 2021-11-17 RX ORDER — LOSARTAN POTASSIUM 50 MG/1
TABLET ORAL
Qty: 135 TABLET | Refills: 0 | Status: SHIPPED | OUTPATIENT
Start: 2021-11-17 | End: 2021-12-13 | Stop reason: SDUPTHER

## 2021-11-17 RX ORDER — AMLODIPINE BESYLATE 10 MG/1
10 TABLET ORAL DAILY
Qty: 90 TABLET | Refills: 0 | Status: SHIPPED | OUTPATIENT
Start: 2021-11-17 | End: 2021-12-13 | Stop reason: SDUPTHER

## 2021-12-13 DIAGNOSIS — I10 ESSENTIAL HYPERTENSION: ICD-10-CM

## 2021-12-13 DIAGNOSIS — E78.5 HYPERLIPIDEMIA: ICD-10-CM

## 2021-12-13 DIAGNOSIS — D64.9 ANEMIA: ICD-10-CM

## 2021-12-13 DIAGNOSIS — Z12.5 SPECIAL SCREENING EXAMINATION FOR NEOPLASM OF PROSTATE: ICD-10-CM

## 2021-12-13 DIAGNOSIS — K21.9 GASTROESOPHAGEAL REFLUX DISEASE WITHOUT ESOPHAGITIS: ICD-10-CM

## 2021-12-13 RX ORDER — AMLODIPINE BESYLATE 10 MG/1
10 TABLET ORAL DAILY
Qty: 90 TABLET | Refills: 0 | Status: CANCELLED | OUTPATIENT
Start: 2021-12-13

## 2021-12-13 RX ORDER — LOSARTAN POTASSIUM 50 MG/1
75 TABLET ORAL DAILY
Qty: 135 TABLET | Refills: 0 | Status: CANCELLED | OUTPATIENT
Start: 2021-12-13

## 2021-12-13 RX ORDER — AMLODIPINE BESYLATE 10 MG/1
10 TABLET ORAL DAILY
Qty: 90 TABLET | Refills: 1 | Status: SHIPPED | OUTPATIENT
Start: 2021-12-13 | End: 2022-06-09

## 2021-12-13 RX ORDER — LOSARTAN POTASSIUM 50 MG/1
75 TABLET ORAL DAILY
Qty: 135 TABLET | Refills: 1 | Status: SHIPPED | OUTPATIENT
Start: 2021-12-13 | End: 2022-06-09

## 2021-12-13 NOTE — TELEPHONE ENCOUNTER
Caller: Bob Escamilla    Relationship: Self    Best call back number: [phone]    Requested Prescriptions:   Requested Prescriptions     Pending Prescriptions Disp Refills   • amLODIPine (NORVASC) 10 MG tablet 90 tablet 0     Sig: Take 1 tablet by mouth Daily.   • losartan (COZAAR) 50 MG tablet 135 tablet 0     Sig: Take 1.5 tablets by mouth Daily.        CHANGE IN PHARMACY:   Pharmacy where request should be sent: [address] AT [facility] & (STAN ) - [phone] [facility] [phone] FX     Additional details provided by patient: PATIENT REQUESTING 90 DAY SUPPLY     Does the patient have less than a 3 day supply:  [x] Yes  [] No    Willy Ramirez Rep   12/13/21 08:20 EST

## 2022-03-15 ENCOUNTER — OFFICE VISIT (OUTPATIENT)
Dept: GASTROENTEROLOGY | Facility: CLINIC | Age: 67
End: 2022-03-15

## 2022-03-15 VITALS — BODY MASS INDEX: 37.83 KG/M2 | WEIGHT: 235.4 LBS | TEMPERATURE: 97.8 F | HEIGHT: 66 IN

## 2022-03-15 DIAGNOSIS — K21.9 GASTROESOPHAGEAL REFLUX DISEASE WITHOUT ESOPHAGITIS: Primary | ICD-10-CM

## 2022-03-15 PROCEDURE — 99203 OFFICE O/P NEW LOW 30 MIN: CPT | Performed by: INTERNAL MEDICINE

## 2022-03-15 RX ORDER — CETIRIZINE HYDROCHLORIDE 10 MG/1
10 TABLET ORAL DAILY
COMMUNITY

## 2022-03-15 RX ORDER — MAGNESIUM HYDROXIDE/ALUMINUM HYDROXICE/SIMETHICONE 120; 1200; 1200 MG/30ML; MG/30ML; MG/30ML
15 SUSPENSION ORAL AS NEEDED
COMMUNITY

## 2022-03-15 RX ORDER — SENNOSIDES 8.6 MG
650 CAPSULE ORAL EVERY 8 HOURS PRN
COMMUNITY

## 2022-03-15 RX ORDER — BISMUTH SUBSALICYLATE 262 MG/1
524 TABLET, CHEWABLE ORAL AS NEEDED
Status: ON HOLD | COMMUNITY
End: 2022-08-30

## 2022-03-15 RX ORDER — MAG HYDROX/ALUMINUM HYD/SIMETH 400-400-40
1 SUSPENSION, ORAL (FINAL DOSE FORM) ORAL NIGHTLY
COMMUNITY

## 2022-03-15 RX ORDER — ESOMEPRAZOLE MAGNESIUM 40 MG/1
40 CAPSULE, DELAYED RELEASE ORAL DAILY
Qty: 90 CAPSULE | Refills: 3 | Status: SHIPPED | OUTPATIENT
Start: 2022-03-15 | End: 2022-06-17 | Stop reason: ALTCHOICE

## 2022-03-15 NOTE — PROGRESS NOTES
Chief Complaint   Patient presents with   • Heartburn   • Nausea     Bob Escamilla is a 66 y.o. male who presents with a history of heartburn and reflux  HPI     Patient 66-year-old male with history hypertension, diabetes and asthma with longstanding reflux that has been well controlled on omeprazole daily.  Patient reports over the last several months started getting breakthrough reflux.  Patient with regurgitation and nausea with the recent exacerbations decided to start taking twice daily Nexium.  Since starting the Nexium his symptoms have improved now feeling better here for further recommendations.  Patient denies any nausea vomiting no melena or bright red blood per rectum.  The symptoms have pretty much resolved on current therapy.    Past Medical History:   Diagnosis Date   • Asthma    • Diabetes mellitus (HCC)    • Hypertension        Current Outpatient Medications:   •  acetaminophen (TYLENOL) 650 MG 8 hr tablet, Take 650 mg by mouth Every 8 (Eight) Hours As Needed for Mild Pain ., Disp: , Rfl:   •  aluminum-magnesium hydroxide-simethicone (MAALOX/MYLANTA) 200-200-20 MG/5ML suspension, Take  by mouth As Needed for Indigestion or Heartburn., Disp: , Rfl:   •  amLODIPine (NORVASC) 10 MG tablet, Take 1 tablet by mouth Daily., Disp: 90 tablet, Rfl: 1  •  aspirin 81 MG EC tablet, Take 81 mg by mouth daily., Disp: , Rfl:   •  bismuth subsalicylate (PEPTO BISMOL) 262 MG chewable tablet, Chew 524 mg As Needed for Indigestion., Disp: , Rfl:   •  cetirizine (zyrTEC) 10 MG tablet, Take 10 mg by mouth Daily., Disp: , Rfl:   •  Ferrous Gluconate (IRON 27 PO), Take  by mouth Daily., Disp: , Rfl:   •  fexofenadine (ALLEGRA) 180 MG tablet, Take 180 mg by mouth Daily. .5 daily, Disp: , Rfl:   •  glimepiride (Amaryl) 2 MG tablet, 2 pills p.o. daily, Disp: 180 tablet, Rfl: 1  •  Glucosamine-Chondroitin (OSTEO BI-FLEX REGULAR STRENGTH PO), Take  by mouth 2 (Two) Times a Day., Disp: , Rfl:   •  glucose blood test strip, DX  E11.9 check bs bid accu check guide strips, Disp: 200 each, Rfl: 3  •  glucose monitor monitoring kit, 1 each As Needed (check bs daily). Dx E11.9 check bs daily/give all supplies best covered by his insurance, Disp: 1 each, Rfl: 1  •  Lancets misc, DX E11.9 check bs bid give fastclix lancets, Disp: 200 each, Rfl: 3  •  losartan (COZAAR) 50 MG tablet, Take 1.5 tablets by mouth Daily., Disp: 135 tablet, Rfl: 1  •  metFORMIN (GLUCOPHAGE) 1000 MG tablet, TAKE 1 TABLET BY MOUTH TWICE DAILY WITH MEALS, Disp: 180 tablet, Rfl: 3  •  Multiple Vitamins-Minerals (MULTIVITAMIN ADULT PO), Take  by mouth., Disp: , Rfl:   •  Probiotic Product (PROBIOTIC PO), Take  by mouth Daily., Disp: , Rfl:   •  rOPINIRole (REQUIP) 0.5 MG tablet, , Disp: , Rfl:   •  Saw Palmetto 450 MG capsule, Take  by mouth 2 (Two) Times a Day., Disp: , Rfl:   •  vitamin C (ASCORBIC ACID) 500 MG tablet, Take 500 mg by mouth daily., Disp: , Rfl:   •  AFLURIA QUADRIVALENT 0.5 ML suspension prefilled syringe injection, ADM 0.5ML IM UTD, Disp: , Rfl: 0  •  albuterol (PROVENTIL HFA;VENTOLIN HFA) 108 (90 Base) MCG/ACT inhaler, Inhale 2 puffs Every 4 (Four) Hours As Needed for Wheezing., Disp: 1 inhaler, Rfl: 1  •  desoximetasone (TOPICORT) 0.05 % cream, , Disp: , Rfl:   •  diphenhydrAMINE (BENADRYL) 25 mg capsule, Take 25 mg by mouth 2 (Two) Times a Day As Needed for Itching., Disp: , Rfl:   •  doxepin (SINEquan) 10 MG capsule, TAKE 1 CAPSULE BY MOUTH EVERY NIGHT, Disp: 90 capsule, Rfl: 1  •  esomeprazole (nexIUM) 40 MG capsule, Take 1 capsule by mouth Daily., Disp: 90 capsule, Rfl: 3  •  fluticasone (FLONASE) 50 MCG/ACT nasal spray, 2 sprays into the nostril(s) as directed by provider Daily., Disp: 1 bottle, Rfl: 3  •  hydrOXYzine (ATARAX) 25 MG tablet, TK 1 T PO QD HS, Disp: , Rfl:   •  ketoconazole (NIZORAL) 2 % cream, APPLY TO AFFECTED AREAS ON GENITALS BID., Disp: , Rfl:   •  TERBINAFINE HCL PO, Take 125 mg by mouth., Disp: , Rfl:   Allergies   Allergen  Reactions   • Benzamide Derivatives    • Corticosteroids    • Formaldehyde Itching     Pt is only able to wear cotton clothing   • Benzonatate Rash   • Cortisone Rash   • Trigonella Foenum-Graecum Rash     Social History     Socioeconomic History   • Marital status:    Tobacco Use   • Smoking status: Never Smoker   • Smokeless tobacco: Never Used   Substance and Sexual Activity   • Alcohol use: No   • Drug use: No     Family History   Problem Relation Age of Onset   • No Known Problems Mother    • Heart disease Father    • No Known Problems Sister      Review of Systems   Constitutional: Negative.    HENT: Negative.    Eyes: Negative.    Respiratory: Negative.    Cardiovascular: Negative.    Gastrointestinal: Negative.    Endocrine: Negative.    Musculoskeletal: Negative.    Skin: Negative.    Allergic/Immunologic: Negative.    Hematological: Negative.      Vitals:    03/15/22 1604   Temp: 97.8 °F (36.6 °C)     Physical Exam  Vitals and nursing note reviewed.   Constitutional:       Appearance: Normal appearance. He is well-developed.   HENT:      Head: Normocephalic and atraumatic.   Eyes:      General: No scleral icterus.     Conjunctiva/sclera: Conjunctivae normal.   Neck:      Trachea: No tracheal deviation.   Cardiovascular:      Rate and Rhythm: Normal rate and regular rhythm.      Heart sounds: No murmur heard.    No friction rub. No gallop.   Pulmonary:      Effort: Pulmonary effort is normal. No respiratory distress.      Breath sounds: Normal breath sounds. No wheezing or rales.   Abdominal:      General: Bowel sounds are normal. There is no distension.      Palpations: Abdomen is soft. There is no mass.      Tenderness: There is no abdominal tenderness. There is no guarding or rebound.   Musculoskeletal:         General: No swelling or tenderness. Normal range of motion.      Cervical back: Normal range of motion and neck supple. No rigidity.   Skin:     General: Skin is warm and dry.       Coloration: Skin is not jaundiced.      Findings: No rash.   Neurological:      General: No focal deficit present.      Mental Status: He is alert and oriented to person, place, and time.      Cranial Nerves: No cranial nerve deficit.   Psychiatric:         Behavior: Behavior normal.         Thought Content: Thought content normal.         Judgment: Judgment normal.       Diagnoses and all orders for this visit:    Gastroesophageal reflux disease without esophagitis    Other orders  -     Ferrous Gluconate (IRON 27 PO); Take  by mouth Daily.  -     Probiotic Product (PROBIOTIC PO); Take  by mouth Daily.  -     acetaminophen (TYLENOL) 650 MG 8 hr tablet; Take 650 mg by mouth Every 8 (Eight) Hours As Needed for Mild Pain .  -     Saw Palmetto 450 MG capsule; Take  by mouth 2 (Two) Times a Day.  -     Glucosamine-Chondroitin (OSTEO BI-FLEX REGULAR STRENGTH PO); Take  by mouth 2 (Two) Times a Day.  -     cetirizine (zyrTEC) 10 MG tablet; Take 10 mg by mouth Daily.  -     bismuth subsalicylate (PEPTO BISMOL) 262 MG chewable tablet; Chew 524 mg As Needed for Indigestion.  -     aluminum-magnesium hydroxide-simethicone (MAALOX/MYLANTA) 200-200-20 MG/5ML suspension; Take  by mouth As Needed for Indigestion or Heartburn.  -     esomeprazole (nexIUM) 40 MG capsule; Take 1 capsule by mouth Daily.    Patient 66-year-old male with history of diabetes, hypertension and asthma presenting with long history of esophageal reflux.  Endoscopy performed in 2017 showed no evidence of Ochoa's esophagus there was some mild gastritis but otherwise benign hyperplastic polyp was removed.  Patient had done well for a number of years on omeprazole but recently started getting breakthrough.  Patient started taking twice daily over-the-counter Nexium to good effect now here for further recommendations.  Patient on current therapy will reports no nausea vomiting no melena or bright red blood per rectum.  Patient last colonoscopy in 2016 was told  to follow-up repeat colonoscopy in 10 years.  For now we will continue the Nexium 40 mg daily and follow clinical response.  If symptoms do not improve however patient instructed to call back for further investigation.

## 2022-03-18 ENCOUNTER — TELEPHONE (OUTPATIENT)
Dept: GASTROENTEROLOGY | Facility: CLINIC | Age: 67
End: 2022-03-18

## 2022-03-18 NOTE — TELEPHONE ENCOUNTER
Prior authorization for esomeprazole has been submitted via Counts include 234 beds at the [facility].

## 2022-03-21 NOTE — SERVICEHPINOTES
Assessment/Plan:      Problem List Items Addressed This Visit        Digestive    Gastroesophageal reflux disease       Cardiovascular and Mediastinum    Hypertension       Musculoskeletal and Integument    Costochondritis       Other    Burping    Hyperlipidemia    Relevant Orders    Comprehensive metabolic panel    Lipid Panel with Direct LDL reflex    Medicare annual wellness visit, subsequent - Primary      Other Visit Diagnoses     Encounter for screening mammogram for malignant neoplasm of breast        Relevant Orders    Mammo screening bilateral w 3d & cad    Screen for colon cancer        Relevant Orders    Ambulatory referral for colonoscopy    Finger pain, right        Arthralgia of right temporomandibular joint        Trigger finger, right index finger        Mild asthma without complication, unspecified whether persistent        Medicare annual wellness visit, initial               Plan/Discussion:  Tenosynovitis of the right thumb and index finger  rec evaluation with hand surgery as has been there before  Steroid injection vs surgical intervention to be considered  Costochondritis  Agree with this  Advised nsaid prn, warm compresses  Cardiac work up is unremarkable  Anxiety  On celexa and buspirone  Advise that she cotninue with the same at this point  Burping  Discussed gerd and hiatal hernia  This is likely due to increase acid  Discussed dietary control  Continue with the same medications  Subjective:   Chief Complaint   Patient presents with   [address] Medicare Wellness Visit     Subsequent         Patient ID: Michaelle Dukes is a 70 y o  female  Pt seen for fu of chronic conditiosn  Reports ongoing chest pain  Was told this is more of costochondritis  Cardiac workup was unremarakble  With ognoing pain in the fingers  This triggers and gets locked  Get a lot of gas and burps a lot           The following portions of the Mr. Baires is here for followup. I did colonoscopy on him a year ago. He had a mucosal polyp otherwise his colonoscopy was unremarkable. I reviewed the study and he had an excellent prep. He does have hemorrhoids and occasionally notes a little blood on the tissue but no bloody stool or bloody diarrhea.He is here now because he was found to have heme positive stool. He's also borderline anemic, he has low iron. His reflux is controlled with PPI therapy. He does report occasional nausea with certain foods, he specifically mentions greasy foods or SPAM. There is no dysphagia, odynophagia melena or hematemesis or chest pain. He does take a daily aspirin. He is iron deficient and is on low dose oral iron. He says he takes anymore iron it causes constipation. He does not smoke, he is a social drinker. BR patient's history were reviewed and updated as appropriate: allergies, current medications, past family history, past medical history, past social history, past surgical history and problem list     Review of Systems   Constitutional: Negative  Negative for activity change, appetite change, chills, diaphoresis, fatigue and fever  HENT: Negative for congestion, facial swelling and sore throat  Respiratory: Negative  Negative for apnea, cough, chest tightness and shortness of breath  Cardiovascular: Negative  Negative for chest pain and palpitations  Gastrointestinal: Negative  Negative for abdominal distention, abdominal pain, blood in stool, constipation, diarrhea and nausea  Genitourinary: Negative  Negative for difficulty urinating, dysuria, flank pain and frequency  Objective:  Vitals:    03/17/22 1248   BP: 148/82   Pulse: 71   Temp: 97 7 °F (36 5 °C)   Weight: 79 4 kg (175 lb)   Height: 5' 1 5" (1 562 m)     BP Readings from Last 6 Encounters:   05/01/22 152/69   03/17/22 148/82   01/05/21 135/80   12/02/20 138/70   10/20/20 140/76   08/19/20 150/80      Wt Readings from Last 6 Encounters:   05/01/22 79 4 kg (175 lb)   03/17/22 79 4 kg (175 lb)   01/05/21 77 6 kg (171 lb)   12/02/20 78 5 kg (173 lb)   10/20/20 78 5 kg (173 lb)   08/19/20 78 9 kg (174 lb)             Physical Exam  Vitals and nursing note reviewed  Constitutional:       Appearance: Normal appearance  She is well-developed  HENT:      Head: Normocephalic and atraumatic  Right Ear: External ear normal       Left Ear: External ear normal       Nose: Nose normal    Eyes:      Conjunctiva/sclera: Conjunctivae normal       Pupils: Pupils are equal, round, and reactive to light  Neck:      Thyroid: No thyromegaly  Trachea: No tracheal deviation  Cardiovascular:      Rate and Rhythm: Normal rate and regular rhythm  Heart sounds: Normal heart sounds  No murmur heard        Pulmonary:      Effort: Pulmonary effort is normal  No respiratory distress  Breath sounds: Normal breath sounds  No wheezing  Abdominal:      General: Bowel sounds are normal       Palpations: Abdomen is soft  Musculoskeletal:         General: Normal range of motion  Cervical back: Normal range of motion and neck supple  Skin:     General: Skin is warm and dry  Neurological:      Mental Status: She is alert and oriented to person, place, and time     Psychiatric:         Mood and Affect: Mood normal          Behavior: Behavior normal

## 2022-05-02 ENCOUNTER — OFFICE VISIT (OUTPATIENT)
Dept: FAMILY MEDICINE CLINIC | Facility: CLINIC | Age: 67
End: 2022-05-02

## 2022-05-02 VITALS
DIASTOLIC BLOOD PRESSURE: 68 MMHG | OXYGEN SATURATION: 97 % | RESPIRATION RATE: 18 BRPM | HEART RATE: 62 BPM | WEIGHT: 233 LBS | BODY MASS INDEX: 37.45 KG/M2 | TEMPERATURE: 97.4 F | SYSTOLIC BLOOD PRESSURE: 120 MMHG | HEIGHT: 66 IN

## 2022-05-02 DIAGNOSIS — R09.81 SINUS CONGESTION: ICD-10-CM

## 2022-05-02 DIAGNOSIS — Z12.5 PROSTATE CANCER SCREENING: ICD-10-CM

## 2022-05-02 DIAGNOSIS — Z79.4 TYPE 2 DIABETES MELLITUS WITHOUT COMPLICATION, WITH LONG-TERM CURRENT USE OF INSULIN: Primary | ICD-10-CM

## 2022-05-02 DIAGNOSIS — E11.9 TYPE 2 DIABETES MELLITUS WITHOUT COMPLICATION, WITH LONG-TERM CURRENT USE OF INSULIN: Primary | ICD-10-CM

## 2022-05-02 DIAGNOSIS — M19.90 ARTHRITIS: ICD-10-CM

## 2022-05-02 DIAGNOSIS — I10 PRIMARY HYPERTENSION: ICD-10-CM

## 2022-05-02 LAB
ALBUMIN SERPL-MCNC: 4.3 G/DL (ref 3.5–5.2)
ALBUMIN/GLOB SERPL: 1.7 G/DL
ALP SERPL-CCNC: 74 U/L (ref 39–117)
ALT SERPL W P-5'-P-CCNC: 43 U/L (ref 1–41)
ANION GAP SERPL CALCULATED.3IONS-SCNC: 13 MMOL/L (ref 5–15)
AST SERPL-CCNC: 23 U/L (ref 1–40)
BILIRUB SERPL-MCNC: 0.3 MG/DL (ref 0–1.2)
BUN SERPL-MCNC: 13 MG/DL (ref 8–23)
BUN/CREAT SERPL: 19.7 (ref 7–25)
CALCIUM SPEC-SCNC: 9.2 MG/DL (ref 8.6–10.5)
CHLORIDE SERPL-SCNC: 105 MMOL/L (ref 98–107)
CHOLEST SERPL-MCNC: 135 MG/DL (ref 0–200)
CO2 SERPL-SCNC: 20 MMOL/L (ref 22–29)
CREAT SERPL-MCNC: 0.66 MG/DL (ref 0.76–1.27)
DEVELOPER EXPIRATION DATE: NORMAL
DEVELOPER LOT NUMBER: NORMAL
EGFRCR SERPLBLD CKD-EPI 2021: 104.7 ML/MIN/1.73
ERYTHROCYTE [DISTWIDTH] IN BLOOD BY AUTOMATED COUNT: 12.8 % (ref 12.3–15.4)
EXPIRATION DATE: NORMAL
FECAL OCCULT BLOOD SCREEN, POC: NEGATIVE
GLOBULIN UR ELPH-MCNC: 2.5 GM/DL
GLUCOSE SERPL-MCNC: 152 MG/DL (ref 65–99)
HBA1C MFR BLD: 6.7 % (ref 4.8–5.6)
HCT VFR BLD AUTO: 43.9 % (ref 37.5–51)
HDLC SERPL-MCNC: 33 MG/DL (ref 40–60)
HGB BLD-MCNC: 14.3 G/DL (ref 13–17.7)
LDLC SERPL CALC-MCNC: 82 MG/DL (ref 0–100)
LDLC/HDLC SERPL: 2.44 {RATIO}
LYMPHOCYTES # BLD AUTO: 1.7 10*3/MM3 (ref 0.7–3.1)
LYMPHOCYTES NFR BLD AUTO: 22.9 % (ref 19.6–45.3)
Lab: NORMAL
MCH RBC QN AUTO: 30.7 PG (ref 26.6–33)
MCHC RBC AUTO-ENTMCNC: 32.6 G/DL (ref 31.5–35.7)
MCV RBC AUTO: 94.1 FL (ref 79–97)
MONOCYTES # BLD AUTO: 0.6 10*3/MM3 (ref 0.1–0.9)
MONOCYTES NFR BLD AUTO: 7.3 % (ref 5–12)
NEGATIVE CONTROL: NEGATIVE
NEUTROPHILS NFR BLD AUTO: 5.3 10*3/MM3 (ref 1.7–7)
NEUTROPHILS NFR BLD AUTO: 69.8 % (ref 42.7–76)
PLATELET # BLD AUTO: 259 10*3/MM3 (ref 140–450)
PMV BLD AUTO: 7.6 FL (ref 6–12)
POSITIVE CONTROL: POSITIVE
POTASSIUM SERPL-SCNC: 4.5 MMOL/L (ref 3.5–5.2)
PROT SERPL-MCNC: 6.8 G/DL (ref 6–8.5)
PSA SERPL-MCNC: 0.28 NG/ML (ref 0–4)
RBC # BLD AUTO: 4.66 10*6/MM3 (ref 4.14–5.8)
SODIUM SERPL-SCNC: 138 MMOL/L (ref 136–145)
TRIGL SERPL-MCNC: 108 MG/DL (ref 0–150)
VLDLC SERPL-MCNC: 20 MG/DL (ref 5–40)
WBC NRBC COR # BLD: 7.6 10*3/MM3 (ref 3.4–10.8)

## 2022-05-02 PROCEDURE — 80061 LIPID PANEL: CPT | Performed by: FAMILY MEDICINE

## 2022-05-02 PROCEDURE — 85025 COMPLETE CBC W/AUTO DIFF WBC: CPT | Performed by: FAMILY MEDICINE

## 2022-05-02 PROCEDURE — 82270 OCCULT BLOOD FECES: CPT | Performed by: FAMILY MEDICINE

## 2022-05-02 PROCEDURE — G0103 PSA SCREENING: HCPCS | Performed by: FAMILY MEDICINE

## 2022-05-02 PROCEDURE — 99214 OFFICE O/P EST MOD 30 MIN: CPT | Performed by: FAMILY MEDICINE

## 2022-05-02 PROCEDURE — 80053 COMPREHEN METABOLIC PANEL: CPT | Performed by: FAMILY MEDICINE

## 2022-05-02 PROCEDURE — 83036 HEMOGLOBIN GLYCOSYLATED A1C: CPT | Performed by: FAMILY MEDICINE

## 2022-05-02 RX ORDER — OXYMETAZOLINE HYDROCHLORIDE 0.05 G/100ML
2 SPRAY NASAL NIGHTLY
COMMUNITY

## 2022-05-02 NOTE — PROGRESS NOTES
"SUBJECTIVE:  The patient is a 66-year-old male.  He has diabetes and hypertension.  He has arthritis and allergy symptoms.  He brings in his blood sugar readings and they are acceptable.    PAST MEDICAL HISTORY:  Reviewed.    REVIEW OF SYSTEMS:  Please see above.  All others reviewed and are negative.      OBJECTIVE:   /68 (BP Location: Left arm, Patient Position: Sitting)   Pulse 62   Temp 97.4 °F (36.3 °C) (Infrared)   Resp 18   Ht 167.6 cm (66\")   Wt 106 kg (233 lb)   SpO2 97%   BMI 37.61 kg/m²    Vitals signs are reviewed and are stable.    General:  Well-nourished.  Alert and oriented x3 in no acute distress.  HEENT: PERRLA.   Neck:  Supple.   Lungs:  Clear.    Heart:  Regular rate and rhythm.   Abdomen:   Soft, nontender.   Rectal: Prostate soft symmetrical 1-2+.  No masses.  Hemoccult negative.  Extremities:  No cyanosis, clubbing or edema.   Neurological:  Grossly intact without motor or sensory deficits.     ASSESSMENT:      Diagnoses and all orders for this visit:    1. Type 2 diabetes mellitus without complication, with long-term current use of insulin (HCC) (Primary)  -     CBC & Differential  -     Comprehensive Metabolic Panel  -     Hemoglobin A1c  -     Lipid Panel    2. Primary hypertension  -     CBC & Differential  -     Comprehensive Metabolic Panel  -     Hemoglobin A1c  -     Lipid Panel    3. Arthritis    4. Sinus congestion    5. Prostate cancer screening  -     PSA Screen; Future         PLAN: See above labs.  Healthy lifestyle discussed.  Follow-up on lab orders.  Tylenol arthritis as needed.  Symptomatic treatment for allergies.  Call if problems.    Dictated utilizing Dragon dictation.    "

## 2022-05-17 RX ORDER — GLIMEPIRIDE 2 MG/1
TABLET ORAL
Qty: 180 TABLET | Refills: 1 | Status: SHIPPED | OUTPATIENT
Start: 2022-05-17 | End: 2022-11-14

## 2022-06-09 DIAGNOSIS — I10 ESSENTIAL HYPERTENSION: ICD-10-CM

## 2022-06-09 DIAGNOSIS — D64.9 ANEMIA: ICD-10-CM

## 2022-06-09 DIAGNOSIS — E78.5 HYPERLIPIDEMIA: ICD-10-CM

## 2022-06-09 DIAGNOSIS — Z12.5 SPECIAL SCREENING EXAMINATION FOR NEOPLASM OF PROSTATE: ICD-10-CM

## 2022-06-09 DIAGNOSIS — K21.9 GASTROESOPHAGEAL REFLUX DISEASE WITHOUT ESOPHAGITIS: ICD-10-CM

## 2022-06-09 RX ORDER — AMLODIPINE BESYLATE 10 MG/1
TABLET ORAL
Qty: 90 TABLET | Refills: 1 | Status: SHIPPED | OUTPATIENT
Start: 2022-06-09 | End: 2022-12-12

## 2022-06-09 RX ORDER — LOSARTAN POTASSIUM 50 MG/1
TABLET ORAL
Qty: 135 TABLET | Refills: 1 | Status: SHIPPED | OUTPATIENT
Start: 2022-06-09 | End: 2022-12-12

## 2022-08-30 ENCOUNTER — APPOINTMENT (OUTPATIENT)
Dept: ULTRASOUND IMAGING | Facility: HOSPITAL | Age: 67
End: 2022-08-30

## 2022-08-30 ENCOUNTER — ANESTHESIA EVENT (OUTPATIENT)
Dept: PERIOP | Facility: HOSPITAL | Age: 67
End: 2022-08-30

## 2022-08-30 ENCOUNTER — HOSPITAL ENCOUNTER (OUTPATIENT)
Facility: HOSPITAL | Age: 67
Discharge: HOME | End: 2022-08-30
Attending: EMERGENCY MEDICINE | Admitting: SURGERY

## 2022-08-30 ENCOUNTER — APPOINTMENT (OUTPATIENT)
Dept: GENERAL RADIOLOGY | Facility: HOSPITAL | Age: 67
End: 2022-08-30

## 2022-08-30 ENCOUNTER — READMISSION MANAGEMENT (OUTPATIENT)
Dept: CALL CENTER | Facility: HOSPITAL | Age: 67
End: 2022-08-30

## 2022-08-30 ENCOUNTER — ANESTHESIA (OUTPATIENT)
Dept: PERIOP | Facility: HOSPITAL | Age: 67
End: 2022-08-30

## 2022-08-30 VITALS
HEART RATE: 68 BPM | DIASTOLIC BLOOD PRESSURE: 83 MMHG | TEMPERATURE: 97.8 F | SYSTOLIC BLOOD PRESSURE: 176 MMHG | BODY MASS INDEX: 36.64 KG/M2 | WEIGHT: 228 LBS | OXYGEN SATURATION: 98 % | HEIGHT: 66 IN | RESPIRATION RATE: 18 BRPM

## 2022-08-30 DIAGNOSIS — K80.20 CALCULUS OF GALLBLADDER WITHOUT CHOLECYSTITIS WITHOUT OBSTRUCTION: Primary | ICD-10-CM

## 2022-08-30 DIAGNOSIS — R10.9 ABDOMINAL PAIN: ICD-10-CM

## 2022-08-30 LAB
ABO GROUP BLD: NORMAL
ALBUMIN SERPL-MCNC: 3.7 G/DL (ref 3.5–5.2)
ALBUMIN/GLOB SERPL: 1.4 G/DL
ALP SERPL-CCNC: 163 U/L (ref 39–117)
ALT SERPL W P-5'-P-CCNC: 254 U/L (ref 1–41)
ANION GAP SERPL CALCULATED.3IONS-SCNC: 8 MMOL/L (ref 5–15)
AST SERPL-CCNC: 76 U/L (ref 1–40)
BASOPHILS # BLD AUTO: 0.05 10*3/MM3 (ref 0–0.2)
BASOPHILS NFR BLD AUTO: 0.8 % (ref 0–1.5)
BILIRUB SERPL-MCNC: 1.8 MG/DL (ref 0–1.2)
BILIRUB UR QL STRIP: ABNORMAL
BLD GP AB SCN SERPL QL: NEGATIVE
BUN SERPL-MCNC: 12 MG/DL (ref 8–23)
BUN/CREAT SERPL: 16.9 (ref 7–25)
CALCIUM SPEC-SCNC: 8.8 MG/DL (ref 8.6–10.5)
CHLORIDE SERPL-SCNC: 102 MMOL/L (ref 98–107)
CLARITY UR: CLEAR
CO2 SERPL-SCNC: 23 MMOL/L (ref 22–29)
COLOR UR: ABNORMAL
CREAT SERPL-MCNC: 0.71 MG/DL (ref 0.76–1.27)
DEPRECATED RDW RBC AUTO: 44.4 FL (ref 37–54)
EGFRCR SERPLBLD CKD-EPI 2021: 101.8 ML/MIN/1.73
EOSINOPHIL # BLD AUTO: 0.12 10*3/MM3 (ref 0–0.4)
EOSINOPHIL NFR BLD AUTO: 1.9 % (ref 0.3–6.2)
ERYTHROCYTE [DISTWIDTH] IN BLOOD BY AUTOMATED COUNT: 13.2 % (ref 12.3–15.4)
GLOBULIN UR ELPH-MCNC: 2.7 GM/DL
GLUCOSE BLDC GLUCOMTR-MCNC: 126 MG/DL (ref 70–130)
GLUCOSE BLDC GLUCOMTR-MCNC: 152 MG/DL (ref 70–130)
GLUCOSE SERPL-MCNC: 167 MG/DL (ref 65–99)
GLUCOSE UR STRIP-MCNC: NEGATIVE MG/DL
HCT VFR BLD AUTO: 37.4 % (ref 37.5–51)
HGB BLD-MCNC: 12.8 G/DL (ref 13–17.7)
HGB UR QL STRIP.AUTO: NEGATIVE
IMM GRANULOCYTES # BLD AUTO: 0.01 10*3/MM3 (ref 0–0.05)
IMM GRANULOCYTES NFR BLD AUTO: 0.2 % (ref 0–0.5)
KETONES UR QL STRIP: NEGATIVE
LEUKOCYTE ESTERASE UR QL STRIP.AUTO: NEGATIVE
LYMPHOCYTES # BLD AUTO: 1.31 10*3/MM3 (ref 0.7–3.1)
LYMPHOCYTES NFR BLD AUTO: 21.1 % (ref 19.6–45.3)
MCH RBC QN AUTO: 31.8 PG (ref 26.6–33)
MCHC RBC AUTO-ENTMCNC: 34.2 G/DL (ref 31.5–35.7)
MCV RBC AUTO: 93 FL (ref 79–97)
MONOCYTES # BLD AUTO: 0.62 10*3/MM3 (ref 0.1–0.9)
MONOCYTES NFR BLD AUTO: 10 % (ref 5–12)
NEUTROPHILS NFR BLD AUTO: 4.1 10*3/MM3 (ref 1.7–7)
NEUTROPHILS NFR BLD AUTO: 66 % (ref 42.7–76)
NITRITE UR QL STRIP: NEGATIVE
NRBC BLD AUTO-RTO: 0.2 /100 WBC (ref 0–0.2)
PH UR STRIP.AUTO: 6.5 [PH] (ref 5–8)
PLATELET # BLD AUTO: 220 10*3/MM3 (ref 140–450)
PMV BLD AUTO: 10.2 FL (ref 6–12)
POTASSIUM SERPL-SCNC: 4.1 MMOL/L (ref 3.5–5.2)
PROT SERPL-MCNC: 6.4 G/DL (ref 6–8.5)
PROT UR QL STRIP: NEGATIVE
RBC # BLD AUTO: 4.02 10*6/MM3 (ref 4.14–5.8)
RH BLD: POSITIVE
SODIUM SERPL-SCNC: 133 MMOL/L (ref 136–145)
SP GR UR STRIP: 1.02 (ref 1–1.03)
T&S EXPIRATION DATE: NORMAL
UROBILINOGEN UR QL STRIP: ABNORMAL
WBC NRBC COR # BLD: 6.21 10*3/MM3 (ref 3.4–10.8)

## 2022-08-30 PROCEDURE — 86900 BLOOD TYPING SEROLOGIC ABO: CPT | Performed by: EMERGENCY MEDICINE

## 2022-08-30 PROCEDURE — 96366 THER/PROPH/DIAG IV INF ADDON: CPT

## 2022-08-30 PROCEDURE — 81003 URINALYSIS AUTO W/O SCOPE: CPT | Performed by: EMERGENCY MEDICINE

## 2022-08-30 PROCEDURE — 47563 LAPARO CHOLECYSTECTOMY/GRAPH: CPT | Performed by: SURGERY

## 2022-08-30 PROCEDURE — 99284 EMERGENCY DEPT VISIT MOD MDM: CPT

## 2022-08-30 PROCEDURE — 85025 COMPLETE CBC W/AUTO DIFF WBC: CPT | Performed by: EMERGENCY MEDICINE

## 2022-08-30 PROCEDURE — 25010000002 PIPERACILLIN SOD-TAZOBACTAM PER 1 G: Performed by: EMERGENCY MEDICINE

## 2022-08-30 PROCEDURE — 25010000002 SUCCINYLCHOLINE PER 20 MG: Performed by: NURSE ANESTHETIST, CERTIFIED REGISTERED

## 2022-08-30 PROCEDURE — 96375 TX/PRO/DX INJ NEW DRUG ADDON: CPT

## 2022-08-30 PROCEDURE — 0 IOTHALAMATE 60 % SOLUTION: Performed by: SURGERY

## 2022-08-30 PROCEDURE — 25010000002 ONDANSETRON PER 1 MG: Performed by: EMERGENCY MEDICINE

## 2022-08-30 PROCEDURE — 25010000002 FENTANYL CITRATE (PF) 50 MCG/ML SOLUTION: Performed by: NURSE ANESTHETIST, CERTIFIED REGISTERED

## 2022-08-30 PROCEDURE — 25010000002 PROPOFOL 10 MG/ML EMULSION: Performed by: NURSE ANESTHETIST, CERTIFIED REGISTERED

## 2022-08-30 PROCEDURE — G0378 HOSPITAL OBSERVATION PER HR: HCPCS

## 2022-08-30 PROCEDURE — 74300 X-RAY BILE DUCTS/PANCREAS: CPT

## 2022-08-30 PROCEDURE — 96365 THER/PROPH/DIAG IV INF INIT: CPT

## 2022-08-30 PROCEDURE — 86901 BLOOD TYPING SEROLOGIC RH(D): CPT | Performed by: EMERGENCY MEDICINE

## 2022-08-30 PROCEDURE — 82962 GLUCOSE BLOOD TEST: CPT

## 2022-08-30 PROCEDURE — 86850 RBC ANTIBODY SCREEN: CPT | Performed by: EMERGENCY MEDICINE

## 2022-08-30 PROCEDURE — 88304 TISSUE EXAM BY PATHOLOGIST: CPT | Performed by: SURGERY

## 2022-08-30 PROCEDURE — 25010000002 ONDANSETRON PER 1 MG: Performed by: NURSE ANESTHETIST, CERTIFIED REGISTERED

## 2022-08-30 PROCEDURE — 76705 ECHO EXAM OF ABDOMEN: CPT

## 2022-08-30 PROCEDURE — 99219 PR INITIAL OBSERVATION CARE/DAY 50 MINUTES: CPT | Performed by: SURGERY

## 2022-08-30 PROCEDURE — 80053 COMPREHEN METABOLIC PANEL: CPT | Performed by: EMERGENCY MEDICINE

## 2022-08-30 DEVICE — HORIZON TI ML 6 CLIPS/CART
Type: IMPLANTABLE DEVICE | Site: ABDOMEN | Status: FUNCTIONAL
Brand: WECK

## 2022-08-30 RX ORDER — SODIUM CHLORIDE 0.9 % (FLUSH) 0.9 %
3 SYRINGE (ML) INJECTION EVERY 12 HOURS SCHEDULED
Status: DISCONTINUED | OUTPATIENT
Start: 2022-08-30 | End: 2022-08-30 | Stop reason: HOSPADM

## 2022-08-30 RX ORDER — DIPHENHYDRAMINE HYDROCHLORIDE 50 MG/ML
12.5 INJECTION INTRAMUSCULAR; INTRAVENOUS
Status: DISCONTINUED | OUTPATIENT
Start: 2022-08-30 | End: 2022-08-30 | Stop reason: HOSPADM

## 2022-08-30 RX ORDER — DIPHENHYDRAMINE HCL 25 MG
25 CAPSULE ORAL
Status: DISCONTINUED | OUTPATIENT
Start: 2022-08-30 | End: 2022-08-30 | Stop reason: HOSPADM

## 2022-08-30 RX ORDER — ROCURONIUM BROMIDE 10 MG/ML
INJECTION, SOLUTION INTRAVENOUS AS NEEDED
Status: DISCONTINUED | OUTPATIENT
Start: 2022-08-30 | End: 2022-08-30 | Stop reason: SURG

## 2022-08-30 RX ORDER — ESOMEPRAZOLE MAGNESIUM 40 MG/1
40 CAPSULE, DELAYED RELEASE ORAL NIGHTLY
COMMUNITY

## 2022-08-30 RX ORDER — FAMOTIDINE 10 MG/ML
20 INJECTION, SOLUTION INTRAVENOUS ONCE
Status: COMPLETED | OUTPATIENT
Start: 2022-08-30 | End: 2022-08-30

## 2022-08-30 RX ORDER — ONDANSETRON 2 MG/ML
4 INJECTION INTRAMUSCULAR; INTRAVENOUS ONCE
Status: COMPLETED | OUTPATIENT
Start: 2022-08-30 | End: 2022-08-30

## 2022-08-30 RX ORDER — FENTANYL CITRATE 50 UG/ML
50 INJECTION, SOLUTION INTRAMUSCULAR; INTRAVENOUS
Status: DISCONTINUED | OUTPATIENT
Start: 2022-08-30 | End: 2022-08-30 | Stop reason: HOSPADM

## 2022-08-30 RX ORDER — LIDOCAINE HYDROCHLORIDE 10 MG/ML
0.5 INJECTION, SOLUTION EPIDURAL; INFILTRATION; INTRACAUDAL; PERINEURAL ONCE AS NEEDED
Status: DISCONTINUED | OUTPATIENT
Start: 2022-08-30 | End: 2022-08-30 | Stop reason: HOSPADM

## 2022-08-30 RX ORDER — FAMOTIDINE 10 MG/ML
20 INJECTION, SOLUTION INTRAVENOUS ONCE
Status: DISCONTINUED | OUTPATIENT
Start: 2022-08-30 | End: 2022-08-30 | Stop reason: HOSPADM

## 2022-08-30 RX ORDER — SODIUM CHLORIDE, SODIUM LACTATE, POTASSIUM CHLORIDE, CALCIUM CHLORIDE 600; 310; 30; 20 MG/100ML; MG/100ML; MG/100ML; MG/100ML
9 INJECTION, SOLUTION INTRAVENOUS CONTINUOUS
Status: DISCONTINUED | OUTPATIENT
Start: 2022-08-30 | End: 2022-08-30 | Stop reason: HOSPADM

## 2022-08-30 RX ORDER — PROMETHAZINE HYDROCHLORIDE 25 MG/1
25 TABLET ORAL ONCE AS NEEDED
Status: DISCONTINUED | OUTPATIENT
Start: 2022-08-30 | End: 2022-08-30 | Stop reason: HOSPADM

## 2022-08-30 RX ORDER — PROMETHAZINE HYDROCHLORIDE 25 MG/1
25 SUPPOSITORY RECTAL ONCE AS NEEDED
Status: DISCONTINUED | OUTPATIENT
Start: 2022-08-30 | End: 2022-08-30 | Stop reason: HOSPADM

## 2022-08-30 RX ORDER — MIDAZOLAM HYDROCHLORIDE 1 MG/ML
0.5 INJECTION INTRAMUSCULAR; INTRAVENOUS
Status: DISCONTINUED | OUTPATIENT
Start: 2022-08-30 | End: 2022-08-30 | Stop reason: HOSPADM

## 2022-08-30 RX ORDER — LIDOCAINE HYDROCHLORIDE 20 MG/ML
INJECTION, SOLUTION INFILTRATION; PERINEURAL AS NEEDED
Status: DISCONTINUED | OUTPATIENT
Start: 2022-08-30 | End: 2022-08-30 | Stop reason: SURG

## 2022-08-30 RX ORDER — PROPOFOL 10 MG/ML
VIAL (ML) INTRAVENOUS AS NEEDED
Status: DISCONTINUED | OUTPATIENT
Start: 2022-08-30 | End: 2022-08-30 | Stop reason: SURG

## 2022-08-30 RX ORDER — HYDRALAZINE HYDROCHLORIDE 20 MG/ML
5 INJECTION INTRAMUSCULAR; INTRAVENOUS
Status: DISCONTINUED | OUTPATIENT
Start: 2022-08-30 | End: 2022-08-30 | Stop reason: HOSPADM

## 2022-08-30 RX ORDER — HYDROCODONE BITARTRATE AND ACETAMINOPHEN 5; 325 MG/1; MG/1
1 TABLET ORAL ONCE AS NEEDED
Status: COMPLETED | OUTPATIENT
Start: 2022-08-30 | End: 2022-08-30

## 2022-08-30 RX ORDER — FEXOFENADINE HCL AND PSEUDOEPHEDRINE HCI 180; 240 MG/1; MG/1
1 TABLET, EXTENDED RELEASE ORAL AS NEEDED
COMMUNITY

## 2022-08-30 RX ORDER — SODIUM CHLORIDE 0.9 % (FLUSH) 0.9 %
10 SYRINGE (ML) INJECTION AS NEEDED
Status: DISCONTINUED | OUTPATIENT
Start: 2022-08-30 | End: 2022-08-30 | Stop reason: HOSPADM

## 2022-08-30 RX ORDER — SODIUM CHLORIDE 0.9 % (FLUSH) 0.9 %
3-10 SYRINGE (ML) INJECTION AS NEEDED
Status: DISCONTINUED | OUTPATIENT
Start: 2022-08-30 | End: 2022-08-30 | Stop reason: HOSPADM

## 2022-08-30 RX ORDER — MIDAZOLAM HYDROCHLORIDE 1 MG/ML
1 INJECTION INTRAMUSCULAR; INTRAVENOUS
Status: DISCONTINUED | OUTPATIENT
Start: 2022-08-30 | End: 2022-08-30 | Stop reason: HOSPADM

## 2022-08-30 RX ORDER — HYDROMORPHONE HYDROCHLORIDE 1 MG/ML
0.5 INJECTION, SOLUTION INTRAMUSCULAR; INTRAVENOUS; SUBCUTANEOUS
Status: DISCONTINUED | OUTPATIENT
Start: 2022-08-30 | End: 2022-08-30 | Stop reason: HOSPADM

## 2022-08-30 RX ORDER — FENTANYL CITRATE 50 UG/ML
INJECTION, SOLUTION INTRAMUSCULAR; INTRAVENOUS AS NEEDED
Status: DISCONTINUED | OUTPATIENT
Start: 2022-08-30 | End: 2022-08-30 | Stop reason: SURG

## 2022-08-30 RX ORDER — OXYCODONE AND ACETAMINOPHEN 7.5; 325 MG/1; MG/1
1 TABLET ORAL EVERY 4 HOURS PRN
Status: DISCONTINUED | OUTPATIENT
Start: 2022-08-30 | End: 2022-08-30 | Stop reason: HOSPADM

## 2022-08-30 RX ORDER — GLYCOPYRROLATE 0.2 MG/ML
INJECTION INTRAMUSCULAR; INTRAVENOUS AS NEEDED
Status: DISCONTINUED | OUTPATIENT
Start: 2022-08-30 | End: 2022-08-30 | Stop reason: SURG

## 2022-08-30 RX ORDER — HYDROCODONE BITARTRATE AND ACETAMINOPHEN 5; 325 MG/1; MG/1
TABLET ORAL
Qty: 20 TABLET | Refills: 0 | Status: SHIPPED | OUTPATIENT
Start: 2022-08-30 | End: 2022-09-01 | Stop reason: SDUPTHER

## 2022-08-30 RX ORDER — SODIUM CHLORIDE 9 MG/ML
INJECTION, SOLUTION INTRAVENOUS AS NEEDED
Status: DISCONTINUED | OUTPATIENT
Start: 2022-08-30 | End: 2022-08-30 | Stop reason: HOSPADM

## 2022-08-30 RX ORDER — BUPIVACAINE HYDROCHLORIDE AND EPINEPHRINE 5; 5 MG/ML; UG/ML
INJECTION, SOLUTION EPIDURAL; INTRACAUDAL; PERINEURAL AS NEEDED
Status: DISCONTINUED | OUTPATIENT
Start: 2022-08-30 | End: 2022-08-30 | Stop reason: HOSPADM

## 2022-08-30 RX ORDER — ONDANSETRON 2 MG/ML
INJECTION INTRAMUSCULAR; INTRAVENOUS AS NEEDED
Status: DISCONTINUED | OUTPATIENT
Start: 2022-08-30 | End: 2022-08-30 | Stop reason: SURG

## 2022-08-30 RX ORDER — ONDANSETRON 4 MG/1
4 TABLET, FILM COATED ORAL EVERY 6 HOURS PRN
Qty: 10 TABLET | Refills: 1 | Status: SHIPPED | OUTPATIENT
Start: 2022-08-30 | End: 2022-09-12

## 2022-08-30 RX ORDER — EPHEDRINE SULFATE 50 MG/ML
5 INJECTION, SOLUTION INTRAVENOUS ONCE AS NEEDED
Status: DISCONTINUED | OUTPATIENT
Start: 2022-08-30 | End: 2022-08-30 | Stop reason: HOSPADM

## 2022-08-30 RX ORDER — NALOXONE HCL 0.4 MG/ML
0.2 VIAL (ML) INJECTION AS NEEDED
Status: DISCONTINUED | OUTPATIENT
Start: 2022-08-30 | End: 2022-08-30 | Stop reason: HOSPADM

## 2022-08-30 RX ORDER — SUCCINYLCHOLINE CHLORIDE 20 MG/ML
INJECTION INTRAMUSCULAR; INTRAVENOUS AS NEEDED
Status: DISCONTINUED | OUTPATIENT
Start: 2022-08-30 | End: 2022-08-30 | Stop reason: SURG

## 2022-08-30 RX ORDER — ONDANSETRON 2 MG/ML
4 INJECTION INTRAMUSCULAR; INTRAVENOUS ONCE AS NEEDED
Status: DISCONTINUED | OUTPATIENT
Start: 2022-08-30 | End: 2022-08-30 | Stop reason: HOSPADM

## 2022-08-30 RX ORDER — LABETALOL HYDROCHLORIDE 5 MG/ML
5 INJECTION, SOLUTION INTRAVENOUS
Status: DISCONTINUED | OUTPATIENT
Start: 2022-08-30 | End: 2022-08-30 | Stop reason: HOSPADM

## 2022-08-30 RX ORDER — NAPROXEN SODIUM 220 MG
220 TABLET ORAL 2 TIMES DAILY PRN
COMMUNITY

## 2022-08-30 RX ORDER — FLUMAZENIL 0.1 MG/ML
0.2 INJECTION INTRAVENOUS AS NEEDED
Status: DISCONTINUED | OUTPATIENT
Start: 2022-08-30 | End: 2022-08-30 | Stop reason: HOSPADM

## 2022-08-30 RX ORDER — MAGNESIUM HYDROXIDE 1200 MG/15ML
LIQUID ORAL AS NEEDED
Status: DISCONTINUED | OUTPATIENT
Start: 2022-08-30 | End: 2022-08-30 | Stop reason: HOSPADM

## 2022-08-30 RX ADMIN — ONDANSETRON 4 MG: 2 INJECTION INTRAMUSCULAR; INTRAVENOUS at 14:22

## 2022-08-30 RX ADMIN — LIDOCAINE HYDROCHLORIDE 60 MG: 20 INJECTION, SOLUTION INFILTRATION; PERINEURAL at 13:35

## 2022-08-30 RX ADMIN — FENTANYL CITRATE 50 MCG: 50 INJECTION INTRAMUSCULAR; INTRAVENOUS at 14:02

## 2022-08-30 RX ADMIN — FENTANYL CITRATE 50 MCG: 50 INJECTION INTRAMUSCULAR; INTRAVENOUS at 15:20

## 2022-08-30 RX ADMIN — SODIUM CHLORIDE 1000 ML: 9 INJECTION, SOLUTION INTRAVENOUS at 08:56

## 2022-08-30 RX ADMIN — TAZOBACTAM SODIUM AND PIPERACILLIN SODIUM 3.38 G: 375; 3 INJECTION, SOLUTION INTRAVENOUS at 10:25

## 2022-08-30 RX ADMIN — SUGAMMADEX 200 MG: 100 INJECTION, SOLUTION INTRAVENOUS at 14:30

## 2022-08-30 RX ADMIN — SUCCINYLCHOLINE CHLORIDE 180 MG: 20 INJECTION, SOLUTION INTRAMUSCULAR; INTRAVENOUS; PARENTERAL at 13:35

## 2022-08-30 RX ADMIN — HYDROCODONE BITARTRATE AND ACETAMINOPHEN 1 TABLET: 5; 325 TABLET ORAL at 15:41

## 2022-08-30 RX ADMIN — ROCURONIUM BROMIDE 30 MG: 50 INJECTION INTRAVENOUS at 13:44

## 2022-08-30 RX ADMIN — FAMOTIDINE 20 MG: 10 INJECTION, SOLUTION INTRAVENOUS at 13:23

## 2022-08-30 RX ADMIN — SODIUM CHLORIDE, POTASSIUM CHLORIDE, SODIUM LACTATE AND CALCIUM CHLORIDE 9 ML/HR: 600; 310; 30; 20 INJECTION, SOLUTION INTRAVENOUS at 13:23

## 2022-08-30 RX ADMIN — GLYCOPYRROLATE 0.2 MG: 0.2 INJECTION INTRAMUSCULAR; INTRAVENOUS at 13:59

## 2022-08-30 RX ADMIN — FENTANYL CITRATE 50 MCG: 50 INJECTION INTRAMUSCULAR; INTRAVENOUS at 14:32

## 2022-08-30 RX ADMIN — ONDANSETRON 4 MG: 2 INJECTION INTRAMUSCULAR; INTRAVENOUS at 09:17

## 2022-08-30 RX ADMIN — PROPOFOL 200 MG: 10 INJECTION, EMULSION INTRAVENOUS at 13:35

## 2022-08-30 NOTE — ANESTHESIA POSTPROCEDURE EVALUATION
Patient: Bob Escamilla    Procedure Summary     Date: 08/30/22 Room / Location: [facility] OR  / [facility] MAIN OR    Anesthesia Start: 1329 Anesthesia Stop: 1441    Procedure: CHOLECYSTECTOMY LAPAROSCOPIC WITH CHOLIANGIOGRAM (N/A Abdomen) Diagnosis:     Surgeons: Darin Juan MD Provider: Parker Ashley MD    Anesthesia Type: general ASA Status: 3          Anesthesia Type: general    Vitals  Vitals Value Taken Time   /62 08/30/22 1601   Temp 36.6 °C (97.8 °F) 08/30/22 1438   Pulse 62 08/30/22 1619   Resp 16 08/30/22 1600   SpO2 98 % 08/30/22 1620   Vitals shown include unvalidated device data.        Post Anesthesia Care and Evaluation    Patient location during evaluation: bedside  Patient participation: complete - patient participated  Level of consciousness: awake  Pain management: adequate    Airway patency: patent  Anesthetic complications: No anesthetic complications  PONV Status: controlled  Cardiovascular status: acceptable  Respiratory status: acceptable  Hydration status: acceptable    Comments: --------------------            08/30/22               1700     --------------------   BP:       176/83     Pulse:      68       Resp:       18       Temp:                SpO2:      98%      --------------------

## 2022-08-30 NOTE — OUTREACH NOTE
Prep Survey    Flowsheet Row Responses   [facility] patient discharged from? [place]   Is LACE score < 7 ? Yes   Emergency Room discharge w/ pulse ox? No   Eligibility [facility]   Date of Admission 08/30/22   Date of Discharge 08/30/22   Discharge Disposition Home or Self Care   Discharge diagnosis Laparoscopic cholecystectomy    Does the patient have one of the following disease processes/diagnoses(primary or secondary)? General Surgery   Does the patient have Home health ordered? No   Is there a DME ordered? No   Prep survey completed? Yes          SANDEEP HAWKINS - Registered Nurse

## 2022-08-30 NOTE — ANESTHESIA PROCEDURE NOTES
Airway  Urgency: elective    Date/Time: 8/30/2022 1:38 PM  Airway not difficult    General Information and Staff    Patient location during procedure: OR    Indications and Patient Condition  Indications for airway management: airway protection    Preoxygenated: yes  MILS maintained throughout  Mask difficulty assessment: 0 - not attempted    Final Airway Details  Final airway type: endotracheal airway      Successful airway: ETT  Cuffed: yes   Successful intubation technique: direct laryngoscopy  Facilitating devices/methods: intubating stylet  Endotracheal tube insertion site: oral  Blade: Rachel  Blade size: 4  ETT size (mm): 7.5  Cormack-Lehane Classification: grade I - full view of glottis  Placement verified by: chest auscultation and capnometry   Measured from: lips  ETT/EBT  to lips (cm): 23  Number of attempts at approach: 1  Assessment: lips, teeth, and gum same as pre-op and atraumatic intubation

## 2022-08-30 NOTE — ANESTHESIA PREPROCEDURE EVALUATION
Anesthesia Evaluation     Patient summary reviewed and Nursing notes reviewed                Airway   Mallampati: III  TM distance: >3 FB  Neck ROM: full  No difficulty expected  Dental - normal exam     Pulmonary - normal exam   (+) asthma,sleep apnea,   Cardiovascular - normal exam    ECG reviewed    (+) hypertension,       Neuro/Psych- negative ROS  GI/Hepatic/Renal/Endo    (+) morbid obesity, GERD,  diabetes mellitus type 2 using insulin,     Musculoskeletal     Abdominal    Substance History - negative use     OB/GYN negative ob/gyn ROS         Other   arthritis,                    Anesthesia Plan    ASA 3     general     intravenous induction     Anesthetic plan, risks, benefits, and alternatives have been provided, discussed and informed consent has been obtained with: patient.        CODE STATUS:

## 2022-08-31 ENCOUNTER — TELEPHONE (OUTPATIENT)
Dept: SURGERY | Facility: CLINIC | Age: 67
End: 2022-08-31

## 2022-08-31 ENCOUNTER — TRANSITIONAL CARE MANAGEMENT TELEPHONE ENCOUNTER (OUTPATIENT)
Dept: CALL CENTER | Facility: HOSPITAL | Age: 67
End: 2022-08-31

## 2022-08-31 LAB
LAB AP CASE REPORT: NORMAL
PATH REPORT.FINAL DX SPEC: NORMAL
PATH REPORT.GROSS SPEC: NORMAL

## 2022-08-31 NOTE — OUTREACH NOTE
Call Center TCM Note    Flowsheet Row Responses   [facility] patient discharged from? [place]   Does the patient have one of the following disease processes/diagnoses(primary or secondary)? General Surgery   TCM attempt successful? Yes   Call start time 1443   Call end time 1458   Discharge diagnosis Laparoscopic cholecystectomy    Person spoke with today (if not patient) and relationship patient   Meds reviewed with patient/caregiver? Yes   Does the patient have all medications related to this admission filled (includes all antibiotics, pain medications, etc.) Yes   Is the patient taking all medications as directed (includes completed medication regime)? Yes   Does the patient have a follow up appointment scheduled with their surgeon? Yes  [9/12]   Has the patient kept scheduled appointments due by today? N/A   Comments Conrad Fragoso MD PCP. Patient declines to schedule PCP HFU appt with call today.    Has home health visited the patient within 72 hours of discharge? N/A   Psychosocial issues? No   Did the patient receive a copy of their discharge instructions? Yes   Nursing interventions Reviewed instructions with patient   What is the patient's perception of their health status since discharge? Improving   Nursing interventions Nurse provided patient education   Is the patient /caregiver able to teach back basic post-op care? Practice 'cough and deep breath', Keep incision areas clean,dry and protected   Is the patient/caregiver able to teach back signs and symptoms of incisional infection? Increased redness, swelling or pain at the incisonal site, Increased drainage or bleeding, Pus or odor from incision, Fever   Is the patient/caregiver able to teach back steps to recovery at home? Set small, achievable goals for return to baseline health, Rest and rebuild strength, gradually increase activity   If the patient is a current smoker, are they able to teach back resources for cessation? Not a smoker   Is  the patient/caregiver able to teach back the hierarchy of who to call/visit for symptoms/problems? PCP, Specialist, Home health nurse, Urgent Care, ED, 911 Yes   TCM call completed? Yes          Elizabeth Cabrera RN    8/31/2022, 14:58 EDT

## 2022-08-31 NOTE — TELEPHONE ENCOUNTER
Spoke with patient and advised that we have not received any short term disability paperwork yet. He states his  faxed the paperwork over earlier today. Advised that he may want to follow-up with our office either tomorrow or Friday to ensure we receive it. Also advised that there is a $25 fee for the completion of FMLA/disability paperwork and that he can pay for this over the phone when we complete the paperwork or he can come to the office to pay. Patient would like paperwork faxed back to the company directly and he would like a copy as well.

## 2022-08-31 NOTE — TELEPHONE ENCOUNTER
Caller: DANIEL DUARTE    Relationship to patient: WIFE    Best call back number: [phone]    Patient is needing: SHORT TERM DISABILITY FOR EMPLOYER. PT DIDN'T HAVE FAX NUMBER FOR EMPLOYER BUT WILL CALL BACK WITH MORE INFORMATION TO GET THIS PAPERWORK COMPLETED.

## 2022-09-01 ENCOUNTER — TELEPHONE (OUTPATIENT)
Dept: SURGERY | Facility: CLINIC | Age: 67
End: 2022-09-01

## 2022-09-01 DIAGNOSIS — K80.20 CALCULUS OF GALLBLADDER WITHOUT CHOLECYSTITIS WITHOUT OBSTRUCTION: Primary | ICD-10-CM

## 2022-09-01 DIAGNOSIS — K80.20 CALCULUS OF GALLBLADDER WITHOUT CHOLECYSTITIS WITHOUT OBSTRUCTION: ICD-10-CM

## 2022-09-01 RX ORDER — HYDROCODONE BITARTRATE AND ACETAMINOPHEN 5; 325 MG/1; MG/1
TABLET ORAL
Qty: 20 TABLET | Refills: 0 | Status: SHIPPED | OUTPATIENT
Start: 2022-09-01 | End: 2022-09-12

## 2022-09-01 RX ORDER — HYDROCODONE BITARTRATE AND ACETAMINOPHEN 5; 325 MG/1; MG/1
TABLET ORAL
Qty: 20 TABLET | Refills: 0 | Status: SHIPPED | OUTPATIENT
Start: 2022-09-01 | End: 2022-09-01

## 2022-09-01 RX ORDER — HYDROCODONE BITARTRATE AND ACETAMINOPHEN 5; 325 MG/1; MG/1
TABLET ORAL
Qty: 20 TABLET | Refills: 0 | Status: CANCELLED | OUTPATIENT
Start: 2022-09-01

## 2022-09-01 NOTE — TELEPHONE ENCOUNTER
LOV 5/27/21 - Benign essential hypertension  Blood pressure is stable.  Continue losartan 25 mg daily  Next scheduled appt     Last refill     12/2/21     losartan (COZAAR) 25 MG tablet      Refill placed, per protocol      Patient requesting hydrocodone refill s/p anshu cummings 8/30. He has enough left until Saturday but is afraid of running out since it is a holiday weekend. He is taking ibuprofen as well.

## 2022-09-01 NOTE — TELEPHONE ENCOUNTER
Patient would like refill sent to [facility] located at [facility]. I confirmed that he did not  the prescription from [facility] and also gave a verbal to close out the prescription. Pharmacy has been updated in system.

## 2022-09-07 ENCOUNTER — TELEPHONE (OUTPATIENT)
Dept: SURGERY | Facility: CLINIC | Age: 67
End: 2022-09-07

## 2022-09-07 NOTE — TELEPHONE ENCOUNTER
Contacted patient regarding FMLA needs.  Patient stated the first day he missed work was 8/29/2022.  He is unsure of his return to work date. He would like to discuss this at his post-op appointment on 9/12/22. Patient stated he works as a  and needs documented clearance to return to work.

## 2022-09-07 NOTE — TELEPHONE ENCOUNTER
Spoke with Akiko, patients' wife, and informed her per Dr. Juan, that [employer] needs to switch over to tylenol and ibuprofen for pain control.  Advised Akiko that he can alternate tylenol and ibuprofen for pain control.  She voiced understanding.

## 2022-09-12 ENCOUNTER — TELEPHONE (OUTPATIENT)
Dept: SURGERY | Facility: CLINIC | Age: 67
End: 2022-09-12

## 2022-09-12 ENCOUNTER — OFFICE VISIT (OUTPATIENT)
Dept: SURGERY | Facility: CLINIC | Age: 67
End: 2022-09-12

## 2022-09-12 ENCOUNTER — LAB (OUTPATIENT)
Dept: LAB | Facility: HOSPITAL | Age: 67
End: 2022-09-12

## 2022-09-12 VITALS — BODY MASS INDEX: 36.8 KG/M2 | HEIGHT: 66 IN

## 2022-09-12 DIAGNOSIS — Z48.89 POSTOPERATIVE VISIT: Primary | ICD-10-CM

## 2022-09-12 DIAGNOSIS — K80.20 CALCULUS OF GALLBLADDER WITHOUT CHOLECYSTITIS WITHOUT OBSTRUCTION: ICD-10-CM

## 2022-09-12 LAB
ALBUMIN SERPL-MCNC: 4.3 G/DL (ref 3.5–5.2)
ALBUMIN/GLOB SERPL: 1.5 G/DL
ALP SERPL-CCNC: 101 U/L (ref 39–117)
ALT SERPL W P-5'-P-CCNC: 38 U/L (ref 1–41)
ANION GAP SERPL CALCULATED.3IONS-SCNC: 12.5 MMOL/L (ref 5–15)
AST SERPL-CCNC: 24 U/L (ref 1–40)
BILIRUB SERPL-MCNC: 0.6 MG/DL (ref 0–1.2)
BUN SERPL-MCNC: 12 MG/DL (ref 8–23)
BUN/CREAT SERPL: 13.8 (ref 7–25)
CALCIUM SPEC-SCNC: 9.3 MG/DL (ref 8.6–10.5)
CHLORIDE SERPL-SCNC: 100 MMOL/L (ref 98–107)
CO2 SERPL-SCNC: 22.5 MMOL/L (ref 22–29)
CREAT SERPL-MCNC: 0.87 MG/DL (ref 0.76–1.27)
EGFRCR SERPLBLD CKD-EPI 2021: 95.8 ML/MIN/1.73
GLOBULIN UR ELPH-MCNC: 2.8 GM/DL
GLUCOSE SERPL-MCNC: 145 MG/DL (ref 65–99)
POTASSIUM SERPL-SCNC: 4.2 MMOL/L (ref 3.5–5.2)
PROT SERPL-MCNC: 7.1 G/DL (ref 6–8.5)
SODIUM SERPL-SCNC: 135 MMOL/L (ref 136–145)

## 2022-09-12 PROCEDURE — 99024 POSTOP FOLLOW-UP VISIT: CPT | Performed by: SURGERY

## 2022-09-12 PROCEDURE — 36415 COLL VENOUS BLD VENIPUNCTURE: CPT

## 2022-09-12 PROCEDURE — 80053 COMPREHEN METABOLIC PANEL: CPT

## 2022-09-12 RX ORDER — ESOMEPRAZOLE MAGNESIUM 40 MG/1
40 CAPSULE, DELAYED RELEASE ORAL DAILY
Qty: 90 CAPSULE | Refills: 3 | Status: SHIPPED | OUTPATIENT
Start: 2022-09-12 | End: 2022-12-11

## 2022-09-12 NOTE — TELEPHONE ENCOUNTER
----- Message from Darin Juan MD sent at 9/12/2022 12:45 PM EDT -----  Please let him know that all of his liver enzymes have returned to normal.

## 2022-09-12 NOTE — PROGRESS NOTES
Postoperative visit    Laparoscopic cholecystectomy with cholangiogram 8/30/2022    Pathology: Chronic active cholecystitis and cholelithiasis  Cholangiogram: Normal    Office visit: Incisions have healed well and he is doing well, reporting no problems from the surgery.  Activity restrictions discussed.  I repeated his comprehensive metabolic panel today and the only abnormality was glucose of 145.  His liver enzymes have all returned to normal.

## 2022-11-02 ENCOUNTER — OFFICE VISIT (OUTPATIENT)
Dept: FAMILY MEDICINE CLINIC | Facility: CLINIC | Age: 67
End: 2022-11-02

## 2022-11-02 VITALS
SYSTOLIC BLOOD PRESSURE: 136 MMHG | HEIGHT: 66 IN | WEIGHT: 229.6 LBS | DIASTOLIC BLOOD PRESSURE: 80 MMHG | OXYGEN SATURATION: 98 % | BODY MASS INDEX: 36.9 KG/M2 | TEMPERATURE: 97.3 F | HEART RATE: 58 BPM

## 2022-11-02 DIAGNOSIS — E11.9 TYPE 2 DIABETES MELLITUS WITHOUT COMPLICATION, WITH LONG-TERM CURRENT USE OF INSULIN: Primary | ICD-10-CM

## 2022-11-02 DIAGNOSIS — K21.9 GASTROESOPHAGEAL REFLUX DISEASE, UNSPECIFIED WHETHER ESOPHAGITIS PRESENT: ICD-10-CM

## 2022-11-02 DIAGNOSIS — I10 PRIMARY HYPERTENSION: ICD-10-CM

## 2022-11-02 DIAGNOSIS — Z12.11 SCREENING FOR COLON CANCER: ICD-10-CM

## 2022-11-02 DIAGNOSIS — Z79.4 TYPE 2 DIABETES MELLITUS WITHOUT COMPLICATION, WITH LONG-TERM CURRENT USE OF INSULIN: Primary | ICD-10-CM

## 2022-11-02 LAB
ALBUMIN SERPL-MCNC: 4.8 G/DL (ref 3.5–5.2)
ALBUMIN/GLOB SERPL: 2 G/DL
ALP SERPL-CCNC: 80 U/L (ref 39–117)
ALT SERPL W P-5'-P-CCNC: 46 U/L (ref 1–41)
ANION GAP SERPL CALCULATED.3IONS-SCNC: 11.4 MMOL/L (ref 5–15)
AST SERPL-CCNC: 35 U/L (ref 1–40)
BILIRUB SERPL-MCNC: 0.6 MG/DL (ref 0–1.2)
BUN SERPL-MCNC: 15 MG/DL (ref 8–23)
BUN/CREAT SERPL: 16.9 (ref 7–25)
CALCIUM SPEC-SCNC: 9.4 MG/DL (ref 8.6–10.5)
CHLORIDE SERPL-SCNC: 103 MMOL/L (ref 98–107)
CHOLEST SERPL-MCNC: 154 MG/DL (ref 0–200)
CO2 SERPL-SCNC: 23.6 MMOL/L (ref 22–29)
CREAT SERPL-MCNC: 0.89 MG/DL (ref 0.76–1.27)
DEVELOPER EXPIRATION DATE: NORMAL
DEVELOPER LOT NUMBER: NORMAL
EGFRCR SERPLBLD CKD-EPI 2021: 95.1 ML/MIN/1.73
ERYTHROCYTE [DISTWIDTH] IN BLOOD BY AUTOMATED COUNT: 12.8 % (ref 12.3–15.4)
EXPIRATION DATE: NORMAL
FECAL OCCULT BLOOD SCREEN, POC: NEGATIVE
GLOBULIN UR ELPH-MCNC: 2.4 GM/DL
GLUCOSE SERPL-MCNC: 125 MG/DL (ref 65–99)
HBA1C MFR BLD: 6.4 % (ref 4.8–5.6)
HCT VFR BLD AUTO: 42 % (ref 37.5–51)
HDLC SERPL-MCNC: 33 MG/DL (ref 40–60)
HGB BLD-MCNC: 13.8 G/DL (ref 13–17.7)
LDLC SERPL CALC-MCNC: 76 MG/DL (ref 0–100)
LDLC/HDLC SERPL: 2.01 {RATIO}
LYMPHOCYTES # BLD AUTO: 2.3 10*3/MM3 (ref 0.7–3.1)
LYMPHOCYTES NFR BLD AUTO: 28.7 % (ref 19.6–45.3)
Lab: NORMAL
MCH RBC QN AUTO: 30.3 PG (ref 26.6–33)
MCHC RBC AUTO-ENTMCNC: 32.8 G/DL (ref 31.5–35.7)
MCV RBC AUTO: 92.4 FL (ref 79–97)
MONOCYTES # BLD AUTO: 0.2 10*3/MM3 (ref 0.1–0.9)
MONOCYTES NFR BLD AUTO: 3 % (ref 5–12)
NEGATIVE CONTROL: NEGATIVE
NEUTROPHILS NFR BLD AUTO: 5.4 10*3/MM3 (ref 1.7–7)
NEUTROPHILS NFR BLD AUTO: 68.3 % (ref 42.7–76)
PLATELET # BLD AUTO: 263 10*3/MM3 (ref 140–450)
PMV BLD AUTO: 7.8 FL (ref 6–12)
POSITIVE CONTROL: POSITIVE
POTASSIUM SERPL-SCNC: 4.5 MMOL/L (ref 3.5–5.2)
PROT SERPL-MCNC: 7.2 G/DL (ref 6–8.5)
RBC # BLD AUTO: 4.55 10*6/MM3 (ref 4.14–5.8)
SODIUM SERPL-SCNC: 138 MMOL/L (ref 136–145)
TRIGL SERPL-MCNC: 274 MG/DL (ref 0–150)
VLDLC SERPL-MCNC: 45 MG/DL (ref 5–40)
WBC NRBC COR # BLD: 7.9 10*3/MM3 (ref 3.4–10.8)

## 2022-11-02 PROCEDURE — 83036 HEMOGLOBIN GLYCOSYLATED A1C: CPT | Performed by: FAMILY MEDICINE

## 2022-11-02 PROCEDURE — 36415 COLL VENOUS BLD VENIPUNCTURE: CPT | Performed by: FAMILY MEDICINE

## 2022-11-02 PROCEDURE — 80061 LIPID PANEL: CPT | Performed by: FAMILY MEDICINE

## 2022-11-02 PROCEDURE — 85025 COMPLETE CBC W/AUTO DIFF WBC: CPT | Performed by: FAMILY MEDICINE

## 2022-11-02 PROCEDURE — 80053 COMPREHEN METABOLIC PANEL: CPT | Performed by: FAMILY MEDICINE

## 2022-11-02 PROCEDURE — 99213 OFFICE O/P EST LOW 20 MIN: CPT | Performed by: FAMILY MEDICINE

## 2022-11-02 PROCEDURE — 82270 OCCULT BLOOD FECES: CPT | Performed by: FAMILY MEDICINE

## 2022-11-02 NOTE — PROGRESS NOTES
"SUBJECTIVE:  The patient is a 67-year-old male.  He has diabetes and hypertension.  He had a cholecystectomy in August of this year.  He lost some weight following that.  His blood sugars are variable.    PAST MEDICAL HISTORY:  Reviewed.    REVIEW OF SYSTEMS:  Please see above.  All others reviewed and are negative.      OBJECTIVE:   /80 (BP Location: Left arm, Patient Position: Sitting, Cuff Size: Large Adult)   Pulse 58   Temp 97.3 °F (36.3 °C) (Infrared)   Ht 167.6 cm (66\")   Wt 104 kg (229 lb 9.6 oz)   SpO2 98%   BMI 37.06 kg/m²    Vitals signs are reviewed and are stable.    General:  Well-nourished.  Alert and oriented x3 in no acute distress.  HEENT: PERRLA.   Neck:  Supple.   Lungs:  Clear.    Heart:  Regular rate and rhythm.   Abdomen:   Soft, nontender.   Rectal: No masses.  Hemoccult negative  Extremities:  No cyanosis, clubbing or edema.   Neurological:  Grossly intact without motor or sensory deficits.     ASSESSMENT:      Diagnoses and all orders for this visit:    1. Type 2 diabetes mellitus without complication, with long-term current use of insulin (HCC) (Primary)  -     CBC & Differential  -     Comprehensive Metabolic Panel  -     Hemoglobin A1c  -     Lipid Panel    2. Primary hypertension  -     CBC & Differential  -     Comprehensive Metabolic Panel  -     Hemoglobin A1c  -     Lipid Panel    3. Gastroesophageal reflux disease, unspecified whether esophagitis present  -     CBC & Differential  -     Comprehensive Metabolic Panel  -     Hemoglobin A1c  -     Lipid Panel         PLAN: He will schedule adult wellness visit when due.  Follow-up on labs.  Healthy lifestyle discussed.  Talked about diet and exercise.  He will call if problems.    Dictated utilizing Dragon dictation.    "

## 2022-11-14 RX ORDER — GLIMEPIRIDE 2 MG/1
4 TABLET ORAL NIGHTLY
Qty: 180 TABLET | Refills: 3 | Status: SHIPPED | OUTPATIENT
Start: 2022-11-14

## 2022-11-16 RX ORDER — LANCETS 30 GAUGE
EACH MISCELLANEOUS
Qty: 200 EACH | Refills: 3 | Status: SHIPPED | OUTPATIENT
Start: 2022-11-16 | End: 2023-02-20 | Stop reason: SDUPTHER

## 2022-12-12 DIAGNOSIS — I10 ESSENTIAL HYPERTENSION: ICD-10-CM

## 2022-12-12 DIAGNOSIS — D64.9 ANEMIA: ICD-10-CM

## 2022-12-12 DIAGNOSIS — Z12.5 SPECIAL SCREENING EXAMINATION FOR NEOPLASM OF PROSTATE: ICD-10-CM

## 2022-12-12 DIAGNOSIS — E78.5 HYPERLIPIDEMIA: ICD-10-CM

## 2022-12-12 DIAGNOSIS — K21.9 GASTROESOPHAGEAL REFLUX DISEASE WITHOUT ESOPHAGITIS: ICD-10-CM

## 2022-12-12 RX ORDER — AMLODIPINE BESYLATE 10 MG/1
TABLET ORAL
Qty: 90 TABLET | Refills: 1 | Status: SHIPPED | OUTPATIENT
Start: 2022-12-12

## 2022-12-12 RX ORDER — LOSARTAN POTASSIUM 50 MG/1
TABLET ORAL
Qty: 135 TABLET | Refills: 1 | Status: SHIPPED | OUTPATIENT
Start: 2022-12-12

## 2023-02-20 RX ORDER — LANCETS 30 GAUGE
EACH MISCELLANEOUS
Qty: 200 EACH | Refills: 3 | Status: SHIPPED | OUTPATIENT
Start: 2023-02-20 | End: 2023-02-20

## 2023-02-20 RX ORDER — LANCETS 30 GAUGE
EACH MISCELLANEOUS
Qty: 100 EACH | Refills: 1 | Status: SHIPPED | OUTPATIENT
Start: 2023-02-20 | End: 2023-02-27 | Stop reason: SDUPTHER

## 2023-02-27 RX ORDER — LANCETS 30 GAUGE
EACH MISCELLANEOUS
Qty: 100 EACH | Refills: 1 | Status: SHIPPED | OUTPATIENT
Start: 2023-02-27

## 2023-05-03 ENCOUNTER — OFFICE VISIT (OUTPATIENT)
Dept: FAMILY MEDICINE CLINIC | Facility: CLINIC | Age: 68
End: 2023-05-03
Payer: MEDICARE

## 2023-05-03 VITALS
DIASTOLIC BLOOD PRESSURE: 80 MMHG | HEART RATE: 65 BPM | WEIGHT: 237 LBS | HEIGHT: 66 IN | BODY MASS INDEX: 38.09 KG/M2 | SYSTOLIC BLOOD PRESSURE: 142 MMHG | RESPIRATION RATE: 18 BRPM | TEMPERATURE: 98.7 F | OXYGEN SATURATION: 96 %

## 2023-05-03 DIAGNOSIS — E11.9 TYPE 2 DIABETES MELLITUS WITHOUT COMPLICATION, WITH LONG-TERM CURRENT USE OF INSULIN: Primary | ICD-10-CM

## 2023-05-03 DIAGNOSIS — M25.511 RIGHT SHOULDER PAIN, UNSPECIFIED CHRONICITY: ICD-10-CM

## 2023-05-03 DIAGNOSIS — Z79.4 TYPE 2 DIABETES MELLITUS WITHOUT COMPLICATION, WITH LONG-TERM CURRENT USE OF INSULIN: Primary | ICD-10-CM

## 2023-05-03 LAB
ALBUMIN SERPL-MCNC: 4.6 G/DL (ref 3.5–5.2)
ALBUMIN/GLOB SERPL: 2 G/DL
ALP SERPL-CCNC: 70 U/L (ref 39–117)
ALT SERPL-CCNC: 46 U/L (ref 1–41)
AST SERPL-CCNC: 27 U/L (ref 1–40)
BASOPHILS # BLD AUTO: 0.06 10*3/MM3 (ref 0–0.2)
BASOPHILS NFR BLD AUTO: 0.9 % (ref 0–1.5)
BILIRUB SERPL-MCNC: 0.3 MG/DL (ref 0–1.2)
BUN SERPL-MCNC: 13 MG/DL (ref 8–23)
BUN/CREAT SERPL: 16.7 (ref 7–25)
CALCIUM SERPL-MCNC: 9.8 MG/DL (ref 8.6–10.5)
CHLORIDE SERPL-SCNC: 100 MMOL/L (ref 98–107)
CHOLEST SERPL-MCNC: 148 MG/DL (ref 0–200)
CO2 SERPL-SCNC: 23.5 MMOL/L (ref 22–29)
CREAT SERPL-MCNC: 0.78 MG/DL (ref 0.76–1.27)
EGFRCR SERPLBLD CKD-EPI 2021: 99 ML/MIN/1.73
EOSINOPHIL # BLD AUTO: 0.24 10*3/MM3 (ref 0–0.4)
EOSINOPHIL NFR BLD AUTO: 3.5 % (ref 0.3–6.2)
ERYTHROCYTE [DISTWIDTH] IN BLOOD BY AUTOMATED COUNT: 12.9 % (ref 12.3–15.4)
GLOBULIN SER CALC-MCNC: 2.3 GM/DL
GLUCOSE SERPL-MCNC: 137 MG/DL (ref 65–99)
HBA1C MFR BLD: 7.1 % (ref 4.8–5.6)
HCT VFR BLD AUTO: 41 % (ref 37.5–51)
HDLC SERPL-MCNC: 38 MG/DL (ref 40–60)
HGB BLD-MCNC: 14.5 G/DL (ref 13–17.7)
IMM GRANULOCYTES # BLD AUTO: 0.02 10*3/MM3 (ref 0–0.05)
IMM GRANULOCYTES NFR BLD AUTO: 0.3 % (ref 0–0.5)
LDLC SERPL CALC-MCNC: 84 MG/DL (ref 0–100)
LYMPHOCYTES # BLD AUTO: 2.01 10*3/MM3 (ref 0.7–3.1)
LYMPHOCYTES NFR BLD AUTO: 29.6 % (ref 19.6–45.3)
MCH RBC QN AUTO: 32.3 PG (ref 26.6–33)
MCHC RBC AUTO-ENTMCNC: 35.4 G/DL (ref 31.5–35.7)
MCV RBC AUTO: 91.3 FL (ref 79–97)
MONOCYTES # BLD AUTO: 0.55 10*3/MM3 (ref 0.1–0.9)
MONOCYTES NFR BLD AUTO: 8.1 % (ref 5–12)
NEUTROPHILS # BLD AUTO: 3.91 10*3/MM3 (ref 1.7–7)
NEUTROPHILS NFR BLD AUTO: 57.6 % (ref 42.7–76)
NRBC BLD AUTO-RTO: 0 /100 WBC (ref 0–0.2)
PLATELET # BLD AUTO: 256 10*3/MM3 (ref 140–450)
POTASSIUM SERPL-SCNC: 4.5 MMOL/L (ref 3.5–5.2)
PROT SERPL-MCNC: 6.9 G/DL (ref 6–8.5)
RBC # BLD AUTO: 4.49 10*6/MM3 (ref 4.14–5.8)
SODIUM SERPL-SCNC: 137 MMOL/L (ref 136–145)
TRIGL SERPL-MCNC: 150 MG/DL (ref 0–150)
VLDLC SERPL CALC-MCNC: 26 MG/DL (ref 5–40)
WBC # BLD AUTO: 6.79 10*3/MM3 (ref 3.4–10.8)

## 2023-05-03 RX ORDER — LOSARTAN POTASSIUM 100 MG/1
100 TABLET ORAL DAILY
Qty: 90 TABLET | Refills: 3 | Status: SHIPPED | OUTPATIENT
Start: 2023-05-03

## 2023-05-03 RX ORDER — GLIMEPIRIDE 2 MG/1
TABLET ORAL
Qty: 180 TABLET | Refills: 3 | Status: SHIPPED | OUTPATIENT
Start: 2023-05-03

## 2023-05-03 NOTE — PROGRESS NOTES
"SUBJECTIVE:  The patient is a 67-year-old male.  He has type 2 diabetes.  Today complains of right shoulder pain.  He states his blood pressures been running a little high.  He brings in his blood sugars and they are averaging in the mid 140s.    PAST MEDICAL HISTORY:  Reviewed.    REVIEW OF SYSTEMS:  Please see above.  All others reviewed and are negative.      OBJECTIVE:   /80 (BP Location: Left arm, Patient Position: Sitting, Cuff Size: Large Adult)   Pulse 65   Temp 98.7 °F (37.1 °C) (Infrared)   Resp 18   Ht 167.6 cm (66\")   Wt 108 kg (237 lb)   SpO2 96%   BMI 38.25 kg/m²    Vitals signs are reviewed and are stable.    General:  Well-nourished.  Alert and oriented x3 in no acute distress.  HEENT: PERRLA.   Neck:  Supple.   Lungs:  Clear.    Heart:  Regular rate and rhythm.   Abdomen:   Soft, nontender.   Extremities:  No cyanosis, clubbing or edema.   Neurological:  Grossly intact without motor or sensory deficits.     ASSESSMENT:      Diagnoses and all orders for this visit:    1. Type 2 diabetes mellitus without complication, with long-term current use of insulin (Primary)  -     CBC & Differential  -     Comprehensive Metabolic Panel  -     Hemoglobin A1c  -     Lipid Panel    2. Right shoulder pain, unspecified chronicity    Other orders  -     losartan (COZAAR) 100 MG tablet; Take 1 tablet by mouth Daily.  Dispense: 90 tablet; Refill: 3  -     glimepiride (Amaryl) 2 MG tablet; 2-1/2 pills daily  Dispense: 180 tablet; Refill: 3         PLAN: Ice/heat/Tylenol for shoulder.  Call me if it does not improve.  We will increase his losartan to 100 mg daily and increase his glimepiride to 5 mg daily.  He will follow-up on labs.  Healthy lifestyle discussed.  He will call if problems.    Dictated utilizing Dragon dictation.    "

## 2023-05-20 DIAGNOSIS — E78.5 HYPERLIPIDEMIA: ICD-10-CM

## 2023-05-20 DIAGNOSIS — Z12.5 SPECIAL SCREENING EXAMINATION FOR NEOPLASM OF PROSTATE: ICD-10-CM

## 2023-05-20 DIAGNOSIS — D64.9 ANEMIA: ICD-10-CM

## 2023-05-20 DIAGNOSIS — K21.9 GASTROESOPHAGEAL REFLUX DISEASE WITHOUT ESOPHAGITIS: ICD-10-CM

## 2023-05-20 DIAGNOSIS — I10 ESSENTIAL HYPERTENSION: ICD-10-CM

## 2023-05-22 RX ORDER — AMLODIPINE BESYLATE 10 MG/1
TABLET ORAL
Qty: 90 TABLET | Refills: 0 | Status: SHIPPED | OUTPATIENT
Start: 2023-05-22

## 2023-07-31 ENCOUNTER — TELEPHONE (OUTPATIENT)
Dept: FAMILY MEDICINE CLINIC | Facility: CLINIC | Age: 68
End: 2023-07-31
Payer: MEDICARE

## 2023-07-31 DIAGNOSIS — E11.9 TYPE 2 DIABETES MELLITUS WITHOUT COMPLICATION, WITH LONG-TERM CURRENT USE OF INSULIN: Primary | ICD-10-CM

## 2023-07-31 DIAGNOSIS — Z79.4 TYPE 2 DIABETES MELLITUS WITHOUT COMPLICATION, WITH LONG-TERM CURRENT USE OF INSULIN: Primary | ICD-10-CM

## 2023-07-31 RX ORDER — DULAGLUTIDE 0.75 MG/.5ML
0.75 INJECTION, SOLUTION SUBCUTANEOUS WEEKLY
Qty: 2 ML | Refills: 1 | Status: SHIPPED | OUTPATIENT
Start: 2023-07-31 | End: 2023-09-29

## 2023-08-02 ENCOUNTER — TELEPHONE (OUTPATIENT)
Dept: FAMILY MEDICINE CLINIC | Facility: CLINIC | Age: 68
End: 2023-08-02
Payer: MEDICARE

## 2023-08-02 DIAGNOSIS — Z79.4 TYPE 2 DIABETES MELLITUS WITHOUT COMPLICATION, WITH LONG-TERM CURRENT USE OF INSULIN: Primary | ICD-10-CM

## 2023-08-02 DIAGNOSIS — E11.9 TYPE 2 DIABETES MELLITUS WITHOUT COMPLICATION, WITH LONG-TERM CURRENT USE OF INSULIN: Primary | ICD-10-CM

## 2023-08-02 RX ORDER — PIOGLITAZONEHYDROCHLORIDE 15 MG/1
15 TABLET ORAL DAILY
Qty: 30 TABLET | Refills: 2 | Status: SHIPPED | OUTPATIENT
Start: 2023-08-02 | End: 2023-10-31

## 2023-08-03 ENCOUNTER — TELEPHONE (OUTPATIENT)
Dept: FAMILY MEDICINE CLINIC | Facility: CLINIC | Age: 68
End: 2023-08-03
Payer: MEDICARE

## 2023-08-03 NOTE — TELEPHONE ENCOUNTER
Caller: Bob Escamilla    Relationship: Self    Best call back number: [phone]       Do you know the name of the person who called: ORTIZ    What was the call regarding:     MEDICATION CONCERNS--WOULD LIKE TO DISCUSS A MEDICATION WITH YOU BEFORE HE TAKES IT.      PATIENT WOULD LIKE A CALL BACK PLEASE

## 2023-08-03 NOTE — TELEPHONE ENCOUNTER
Patient has done research on Actos and he is afraid to take it, he has done research on Glucotrol or Diabeta. His glucose this am was 140 down from 160 do you want him to monitor until apt with you?

## 2023-08-22 DIAGNOSIS — Z12.5 SPECIAL SCREENING EXAMINATION FOR NEOPLASM OF PROSTATE: ICD-10-CM

## 2023-08-22 DIAGNOSIS — D64.9 ANEMIA: ICD-10-CM

## 2023-08-22 DIAGNOSIS — E78.5 HYPERLIPIDEMIA: ICD-10-CM

## 2023-08-22 DIAGNOSIS — K21.9 GASTROESOPHAGEAL REFLUX DISEASE WITHOUT ESOPHAGITIS: ICD-10-CM

## 2023-08-22 DIAGNOSIS — I10 ESSENTIAL HYPERTENSION: ICD-10-CM

## 2023-08-22 RX ORDER — AMLODIPINE BESYLATE 10 MG/1
10 TABLET ORAL DAILY
Qty: 90 TABLET | Refills: 0 | Status: SHIPPED | OUTPATIENT
Start: 2023-08-22

## 2023-09-08 RX ORDER — ESOMEPRAZOLE MAGNESIUM 40 MG/1
40 CAPSULE, DELAYED RELEASE ORAL DAILY
Qty: 30 CAPSULE | Refills: 5 | Status: SHIPPED | OUTPATIENT
Start: 2023-09-08 | End: 2024-03-06

## 2023-09-18 PROBLEM — I10 PRIMARY HYPERTENSION: Status: ACTIVE | Noted: 2023-09-18

## 2023-09-18 PROBLEM — R74.8 ELEVATED LIVER ENZYMES: Status: ACTIVE | Noted: 2023-09-18

## 2023-09-18 PROBLEM — G25.81 RLS (RESTLESS LEGS SYNDROME): Status: ACTIVE | Noted: 2023-09-18

## 2023-09-18 PROBLEM — K21.9 GASTROESOPHAGEAL REFLUX DISEASE WITHOUT ESOPHAGITIS: Status: ACTIVE | Noted: 2023-09-18

## 2023-09-18 PROBLEM — J30.89 ENVIRONMENTAL AND SEASONAL ALLERGIES: Status: ACTIVE | Noted: 2023-09-18

## 2023-09-18 NOTE — PROGRESS NOTES
"Chief Complaint  Diabetes (Didn't try the new RX from research on meds or cost of meds) and Medicare Wellness-Initial Visit    Subjective        Bob Escamilla presents to [facility]  History of Present Illness  69yo WM Patient was previously seen by PCP Dr. Fragoso at [facility], however patient is new to me and is here for establishment of care visit for chronic medical conditions including hypertension, hyperlipidemia, diabetes type 2, GERD.  Lab review from May 2023 revealed elevated liver enzymes and A1c of 7.1.      Pt daniella already got pneumonia shot in 2022 at [facility].  Patient states he is taking metformin only once a day counseled patient metformin as a twice daily medication as written and since patient's fasting glucose was elevated around 150 inform patient taking it twice a day will help with better glycemic control.  Patient states he does not take Actos.  Patient reports Trulicity was not covered by his insurance discussed Ozempic as an alternative to Trulicity and we will order Ozempic in patient to follow-up with pharmacy.  Inform patient if Ozempic is not covered by insurance to resume pioglitazone as patient complaining of nocturia x3 but no BPH symptoms.  Patient reports he had colonoscopy 6 years ago and it was negative.  Patient states he has an ophthalmology appointment coming up soon.    Objective   Vital Signs:  /78 (BP Location: Left arm, Patient Position: Sitting, Cuff Size: Large Adult)   Pulse 66   Temp 97.1 °F (36.2 °C) (Infrared)   Resp 18   Ht 167.6 cm (66\")   Wt 108 kg (237 lb)   SpO2 95%   BMI 38.25 kg/m²   Estimated body mass index is 38.25 kg/m² as calculated from the following:    Height as of this encounter: 167.6 cm (66\").    Weight as of this encounter: 108 kg (237 lb).       Class 2 Severe Obesity (BMI >=35 and <=39.9). Obesity-related health conditions include the following: hypertension, " diabetes mellitus, dyslipidemias, and GERD. Obesity is unchanged. BMI is is above average; BMI management plan is completed. We discussed portion control and increasing exercise.      Physical Exam  Constitutional:       Appearance: Normal appearance.   HENT:      Head: Normocephalic and atraumatic.   Eyes:      Conjunctiva/sclera: Conjunctivae normal.   Cardiovascular:      Rate and Rhythm: Normal rate and regular rhythm.      Heart sounds: Normal heart sounds.      Comments: 1+ bilateral leg edema.  Pulmonary:      Effort: Pulmonary effort is normal.      Breath sounds: Normal breath sounds.   Abdominal:      General: Bowel sounds are normal.      Palpations: Abdomen is soft.      Comments: Non-tender   Skin:     General: Skin is warm.   Neurological:      General: No focal deficit present.      Mental Status: He is alert and oriented to person, place, and time.      Comments: Normal diabetic foot exam with monofilament testing today.  Normal gross sensation bilaterally.  Gross motor strength intact bilateral feet   Psychiatric:         Mood and Affect: Mood normal.         Behavior: Behavior normal.      Result Review :  The following data was reviewed by: ANGIE Tang on 09/19/2023:  Common labs          11/2/2022    08:15 5/3/2023    08:24   Common Labs   Glucose 125  137    BUN 15  13    Creatinine 0.89  0.78    Sodium 138  137    Potassium 4.5  4.5    Chloride 103  100    Calcium 9.4  9.8    Total Protein  6.9    Albumin 4.80  4.6    Total Bilirubin 0.6  0.3    Alkaline Phosphatase 80  70    AST (SGOT) 35  27    ALT (SGPT) 46  46    WBC 7.90  6.79    Hemoglobin 13.8  14.5    Hematocrit 42.0  41.0    Platelets 263  256    Total Cholesterol 154     Total Cholesterol  148    Triglycerides 274  150    HDL Cholesterol 33  38    LDL Cholesterol  76  84    Hemoglobin A1C 6.40  7.10      Data reviewed : Radiologic studies cholecystitis on abdominal ultrasound in 2022 and Consultant notes reviewed  consult note by Dr. Darin Juan which states patient status post laparoscopic cholecystectomy done in September 2020.             Assessment and Plan   Diagnoses and all orders for this visit:    1. Type 2 diabetes mellitus without complication, with long-term current use of insulin (Primary)  Assessment & Plan:  Diabetes is improving with treatment.   Dietary recommendations for ADA diet.  Discussed foot care.  Reminded to get yearly retinal exam.  Medication changes per orders.  Ophthalmology referral.  Diabetes will be reassessed in 6 months.      Orders:  -     Comprehensive Metabolic Panel  -     Hemoglobin A1c  -     MicroAlbumin, Urine, Random - Urine, Clean Catch  -     Semaglutide,0.25 or 0.5MG/DOS, (OZEMPIC) 2 MG/1.5ML solution pen-injector; Inject 0.25 mg under the skin into the appropriate area as directed 1 (One) Time Per Week for 28 days.  Dispense: 3 mL; Refill: 2  -     glimepiride (AMARYL) 2 MG tablet; Take 2 tablets by mouth Every Night.  Dispense: 180 tablet; Refill: 3  -     glucose blood test strip; DX E11.9 check bs daily accu check guide strips  Dispense: 100 each; Refill: 3  -     Lancets misc; DX E11.9 Check bs once daily  Dispense: 100 each; Refill: 1  -     metFORMIN (GLUCOPHAGE) 1000 MG tablet; Take 1 tablet by mouth 2 (Two) Times a Day With Meals.  Dispense: 180 tablet; Refill: 0  -     pioglitazone (Actos) 15 MG tablet; Take 1 tablet by mouth Daily for 90 days.  Dispense: 30 tablet; Refill: 2    2. Environmental and seasonal allergies  Assessment & Plan:  Patient reports having to take multiple medications for seasonal allergy symptoms.  But patient refused sinus x-ray today.  Patient agreed to start Singulair.    Orders:  -     fexofenadine (ALLEGRA) 180 MG tablet; Take 1 tablet by mouth Daily.  Dispense: 90 tablet; Refill: 1  -     montelukast (Singulair) 10 MG tablet; Take 1 tablet by mouth Every Night.  Dispense: 30 tablet; Refill: 5    3. Primary hypertension  Assessment &  Plan:  Hypertension is improving with treatment.  Continue current treatment regimen.  Dietary sodium restriction.  Blood pressure will be reassessed in 3 months.    Orders:  -     Comprehensive Metabolic Panel  -     amLODIPine (NORVASC) 10 MG tablet; Take 1 tablet by mouth Daily.  Dispense: 90 tablet; Refill: 0  -     losartan (COZAAR) 100 MG tablet; Take 1 tablet by mouth Daily.  Dispense: 90 tablet; Refill: 3    4. Gastroesophageal reflux disease without esophagitis  -     esomeprazole (nexIUM) 20 MG capsule; Take 1 capsule by mouth Every Morning.  Dispense: 90 capsule; Refill: 1    5. RLS (restless legs syndrome)  Assessment & Plan:  stable      6. Elevated liver enzymes    7. Hyperlipidemia  Assessment & Plan:  Lipid abnormalities are improving with lifestyle modifications.  Nutritional counseling was provided.  Lipids will be reassessed in 6 months.  Discussed the importance of healthy diet, nutrition, and lifestyle. Recommend low salt, fat/cholesterol diet and avoid concentrated sweets. Encouraged DASH diet along with fresh fruits & vegetables and low fat dairy products. Counseled patient to exercise/walk as tolerated. Avoid tobacco and alcohol use.        8. Anemia    9. Prostate cancer screening  -     PSA Screen    10. Need for hepatitis C screening test  -     Hepatitis C Antibody    11. History of laparoscopic cholecystectomy    12. Bilateral leg edema    Other orders  -     multivitamin with minerals (MULTIVITAMIN ADULT PO); Take 1 tablet by mouth Every Night.  Dispense: 90 tablet; Refill: 2    Discussed bilateral leg edema with patient and to use compression stockings for 8 hours/day during the daytime and to return to clinic for further work-up if it worsens.  Patient states he does not want to come back every 3 months and would prefer to come back in 6 months.         Follow Up   Return in about 6 months (around 3/19/2024).  Patient was given instructions and counseling regarding his condition or  for health maintenance advice. Please see specific information pulled into the AVS if appropriate.

## 2023-09-19 ENCOUNTER — OFFICE VISIT (OUTPATIENT)
Dept: FAMILY MEDICINE CLINIC | Facility: CLINIC | Age: 68
End: 2023-09-19
Payer: MEDICARE

## 2023-09-19 ENCOUNTER — TELEPHONE (OUTPATIENT)
Dept: FAMILY MEDICINE CLINIC | Facility: CLINIC | Age: 68
End: 2023-09-19

## 2023-09-19 VITALS
DIASTOLIC BLOOD PRESSURE: 78 MMHG | RESPIRATION RATE: 18 BRPM | SYSTOLIC BLOOD PRESSURE: 130 MMHG | TEMPERATURE: 97.1 F | OXYGEN SATURATION: 95 % | BODY MASS INDEX: 38.09 KG/M2 | WEIGHT: 237 LBS | HEIGHT: 66 IN | HEART RATE: 66 BPM

## 2023-09-19 DIAGNOSIS — I10 PRIMARY HYPERTENSION: Chronic | ICD-10-CM

## 2023-09-19 DIAGNOSIS — Z12.5 PROSTATE CANCER SCREENING: ICD-10-CM

## 2023-09-19 DIAGNOSIS — K21.9 GASTROESOPHAGEAL REFLUX DISEASE WITHOUT ESOPHAGITIS: Chronic | ICD-10-CM

## 2023-09-19 DIAGNOSIS — J30.89 ENVIRONMENTAL AND SEASONAL ALLERGIES: Chronic | ICD-10-CM

## 2023-09-19 DIAGNOSIS — Z00.00 MEDICARE ANNUAL WELLNESS VISIT, SUBSEQUENT: Primary | ICD-10-CM

## 2023-09-19 DIAGNOSIS — D64.9 ANEMIA: ICD-10-CM

## 2023-09-19 DIAGNOSIS — Z90.49 HISTORY OF LAPAROSCOPIC CHOLECYSTECTOMY: ICD-10-CM

## 2023-09-19 DIAGNOSIS — Z79.4 TYPE 2 DIABETES MELLITUS WITHOUT COMPLICATION, WITH LONG-TERM CURRENT USE OF INSULIN: Chronic | ICD-10-CM

## 2023-09-19 DIAGNOSIS — R74.8 ELEVATED LIVER ENZYMES: ICD-10-CM

## 2023-09-19 DIAGNOSIS — R60.0 BILATERAL LEG EDEMA: ICD-10-CM

## 2023-09-19 DIAGNOSIS — E11.9 TYPE 2 DIABETES MELLITUS WITHOUT COMPLICATION, WITH LONG-TERM CURRENT USE OF INSULIN: Chronic | ICD-10-CM

## 2023-09-19 DIAGNOSIS — E78.5 HYPERLIPIDEMIA: Chronic | ICD-10-CM

## 2023-09-19 DIAGNOSIS — G25.81 RLS (RESTLESS LEGS SYNDROME): ICD-10-CM

## 2023-09-19 DIAGNOSIS — Z11.59 NEED FOR HEPATITIS C SCREENING TEST: ICD-10-CM

## 2023-09-19 PROBLEM — K80.20 CALCULUS OF GALLBLADDER WITHOUT CHOLECYSTITIS WITHOUT OBSTRUCTION: Status: RESOLVED | Noted: 2022-08-30 | Resolved: 2023-09-19

## 2023-09-19 RX ORDER — MULTIPLE VITAMINS W/ MINERALS TAB 9MG-400MCG
1 TAB ORAL NIGHTLY
Qty: 90 TABLET | Refills: 2 | Status: SHIPPED | OUTPATIENT
Start: 2023-09-19

## 2023-09-19 RX ORDER — AMLODIPINE BESYLATE 10 MG/1
10 TABLET ORAL DAILY
Qty: 90 TABLET | Refills: 0 | Status: SHIPPED | OUTPATIENT
Start: 2023-09-19

## 2023-09-19 RX ORDER — FEXOFENADINE HCL 180 MG/1
180 TABLET ORAL DAILY
Qty: 90 TABLET | Refills: 1 | Status: SHIPPED | OUTPATIENT
Start: 2023-09-19

## 2023-09-19 RX ORDER — LOSARTAN POTASSIUM 100 MG/1
100 TABLET ORAL DAILY
Qty: 90 TABLET | Refills: 3 | Status: SHIPPED | OUTPATIENT
Start: 2023-09-19

## 2023-09-19 RX ORDER — GLIMEPIRIDE 2 MG/1
4 TABLET ORAL NIGHTLY
Qty: 180 TABLET | Refills: 3 | Status: SHIPPED | OUTPATIENT
Start: 2023-09-19

## 2023-09-19 RX ORDER — LANCETS 30 GAUGE
EACH MISCELLANEOUS
Qty: 100 EACH | Refills: 1 | Status: SHIPPED | OUTPATIENT
Start: 2023-09-19

## 2023-09-19 RX ORDER — MONTELUKAST SODIUM 10 MG/1
10 TABLET ORAL NIGHTLY
Qty: 30 TABLET | Refills: 5 | Status: SHIPPED | OUTPATIENT
Start: 2023-09-19

## 2023-09-19 RX ORDER — PIOGLITAZONEHYDROCHLORIDE 15 MG/1
15 TABLET ORAL DAILY
Qty: 30 TABLET | Refills: 2 | Status: SHIPPED | OUTPATIENT
Start: 2023-09-19 | End: 2023-12-18

## 2023-09-19 NOTE — TELEPHONE ENCOUNTER
Patient cannot afford the Ozempic, he can try the generic Actos or Pioglitazone, please send it in he cannot afford the pen.

## 2023-09-19 NOTE — PROGRESS NOTES
The ABCs of the Annual Wellness Visit  Initial Medicare Wellness Visit    Subjective     Bob Escamilla is a 68 y.o. male who presents for an Initial Medicare Wellness Visit.    The following portions of the patient's history were reviewed and   updated as appropriate: allergies, current medications, past family history, past medical history, past social history, past surgical history, and problem list.     Compared to one year ago, the patient feels his physical   health is the same.    Compared to one year ago, the patient feels his mental   health is the same.    Recent Hospitalizations:  He was not admitted to the hospital during the last year.       Current Medical Providers:  Patient Care Team:  Dimas Alexander APRN as PCP - General (Nurse Practitioner)  Stephanie Vidal MD (Dermatology)    Outpatient Medications Prior to Visit   Medication Sig Dispense Refill    acetaminophen (TYLENOL) 650 MG 8 hr tablet Take 1 tablet by mouth Every 8 (Eight) Hours As Needed for Mild Pain.      albuterol (PROVENTIL HFA;VENTOLIN HFA) 108 (90 Base) MCG/ACT inhaler Inhale 2 puffs Every 4 (Four) Hours As Needed for Wheezing. 1 inhaler 1    aluminum-magnesium hydroxide-simethicone (MAALOX/MYLANTA) 200-200-20 MG/5ML suspension Take 15 mL by mouth As Needed for Indigestion or Heartburn.      aspirin 81 MG EC tablet Take 1 tablet by mouth Daily.      Ferrous Gluconate (IRON 27 PO) Take 1 tablet by mouth Daily.      GINSENG PO Take 1 capsule by mouth Every Night.      Glucosamine-Chondroitin (OSTEO BI-FLEX REGULAR STRENGTH PO) Take 1 tablet by mouth 2 (Two) Times a Day.      glucose monitor monitoring kit 1 each As Needed (check bs daily). Dx E11.9 check bs daily/give all supplies best covered by his insurance 1 each 1    Misc Natural Products (PROSTATE HEALTH PO) Take  by mouth.      naproxen sodium (ALEVE) 220 MG tablet Take 1 tablet by mouth 2 (Two) Times a Day As Needed.      NON FORMULARY Take 1 tablet by  mouth 2 (Two) Times a Day. Focus Factor      oxymetazoline (AFRIN) 0.05 % nasal spray 2 sprays into the nostril(s) as directed by provider Every Night.      Probiotic Product (PROBIOTIC PO) Take 1 capsule by mouth Daily.      rOPINIRole (REQUIP) 1 MG tablet Take 1 tablet by mouth Every Night.      amLODIPine (NORVASC) 10 MG tablet Take 1 tablet by mouth Daily. 90 tablet 0    cetirizine (zyrTEC) 10 MG tablet Take 1 tablet by mouth Daily.      esomeprazole (nexIUM) 40 MG capsule Take 1 capsule by mouth Daily for 180 days. 30 capsule 5    fexofenadine (ALLEGRA) 180 MG tablet Take 1 tablet by mouth Daily.      glimepiride (AMARYL) 2 MG tablet Take 2 tablets by mouth Every Night. 180 tablet 3    glimepiride (Amaryl) 2 MG tablet 2-1/2 pills daily (Patient taking differently: 2 tablets. 2-daily for 8mg) 180 tablet 3    glucose blood test strip DX E11.9 check bs daily accu check guide strips 100 each 3    Lancets misc DX E11.9 Check bs once daily 100 each 1    losartan (COZAAR) 100 MG tablet Take 1 tablet by mouth Daily. 90 tablet 3    metFORMIN (GLUCOPHAGE) 1000 MG tablet Take 1 tablet by mouth 2 (Two) Times a Day With Meals. 180 tablet 0    Multiple Vitamins-Minerals (MULTIVITAMIN ADULT PO) Take 1 tablet by mouth Every Night.      diphenhydrAMINE (BENADRYL) 25 mg capsule Take 1 capsule by mouth 2 (Two) Times a Day As Needed for Itching. (Patient not taking: Reported on 9/19/2023)      desoximetasone (TOPICORT) 0.05 % cream Apply 1 application  topically to the appropriate area as directed As Needed. (Patient not taking: Reported on 9/19/2023)      Dulaglutide (Trulicity) 0.75 MG/0.5ML solution pen-injector Inject 0.75 mg under the skin into the appropriate area as directed 1 (One) Time Per Week for 60 days. (Patient not taking: Reported on 9/19/2023) 2 mL 1    esomeprazole (nexIUM) 20 MG capsule Take 1 capsule by mouth Every Morning.      fexofenadine-pseudoephedrine (ALLEGRA-D 24) 180-240 MG per 24 hr tablet Take 1  "tablet by mouth As Needed for Allergies.      pioglitazone (Actos) 15 MG tablet Take 1 tablet by mouth Daily for 90 days. 30 tablet 2     No facility-administered medications prior to visit.       No opioid medication identified on active medication list. I have reviewed chart for other potential  high risk medication/s and harmful drug interactions in the elderly.        Aspirin is on active medication list. Aspirin use is indicated based on review of current medical condition/s. Pros and cons of this therapy have been discussed today. Benefits of this medication outweigh potential harm.  Patient has been encouraged to continue taking this medication.  .      Patient Active Problem List   Diagnosis    Type 2 diabetes mellitus without complication, with long-term current use of insulin    Primary hypertension    Gastroesophageal reflux disease without esophagitis    Environmental and seasonal allergies    RLS (restless legs syndrome)    Elevated liver enzymes    Need for hepatitis C screening test    Prostate cancer screening    Special screening examination for neoplasm of prostate    Anemia    Hyperlipidemia    History of laparoscopic cholecystectomy    Bilateral leg edema    Medicare annual wellness visit, subsequent     Advance Care Planning   Advance Care Planning     Advance Directive is not on file.  ACP discussion was held with the patient during this visit. Patient does not have an advance directive, information provided.       Objective    Vitals:    09/19/23 0756   BP: 130/78   BP Location: Left arm   Patient Position: Sitting   Cuff Size: Large Adult   Pulse: 66   Resp: 18   Temp: 97.1 °F (36.2 °C)   TempSrc: Infrared   SpO2: 95%   Weight: 108 kg (237 lb)   Height: 167.6 cm (66\")     Estimated body mass index is 38.25 kg/m² as calculated from the following:    Height as of this encounter: 167.6 cm (66\").    Weight as of this encounter: 108 kg (237 lb).    Class 2 Severe Obesity (BMI >=35 and <=39.9). " Obesity-related health conditions include the following: hypertension, diabetes mellitus, and dyslipidemias. Obesity is unchanged. BMI is is above average; BMI management plan is completed. We discussed portion control and increasing exercise.      Does the patient have evidence of cognitive impairment?   No          HEALTH RISK ASSESSMENT    Smoking Status:  Social History     Tobacco Use   Smoking Status Never   Smokeless Tobacco Never     Alcohol Consumption:  Social History     Substance and Sexual Activity   Alcohol Use No     Fall Risk Screen:    MERYLADI Fall Risk Assessment was completed, and patient is at LOW risk for falls.Assessment completed on:2023    Depression Screen:       2023     8:28 AM   PHQ-2/PHQ-9 Depression Screening   Little Interest or Pleasure in Doing Things 0-->not at all   Feeling Down, Depressed or Hopeless 0-->not at all   PHQ-9: Brief Depression Severity Measure Score 0       Health Habits and Functional and Cognitive Screenin/19/2023     8:00 AM   Functional & Cognitive Status   Do you have difficulty preparing food and eating? No   Do you have difficulty bathing yourself, getting dressed or grooming yourself? No   Do you have difficulty using the toilet? No   Do you have difficulty moving around from place to place? No   Do you have trouble with steps or getting out of a bed or a chair? No   Current Diet Diabetic Diet   Dental Exam Up to date   Eye Exam Not up to date   Exercise (times per week) 0 times per week   Do you need help using the phone?  No   Are you deaf or do you have serious difficulty hearing?  No   Do you need help to go to places out of walking distance? No   Do you need help shopping? No   Do you need help preparing meals?  No   Do you need help with housework?  No   Do you need help with laundry? No   Do you need help taking your medications? No   Do you need help managing money? No   Do you ever drive or ride in a car without wearing a seat belt?  No   Have you felt unusual stress, anger or loneliness in the last month? No   Who do you live with? Spouse   If you need help, do you have trouble finding someone available to you? No   Have you been bothered in the last four weeks by sexual problems? No   Do you have difficulty concentrating, remembering or making decisions? No       Age-appropriate Screening Schedule:  Refer to the list below for future screening recommendations based on patient's age, sex and/or medical conditions. Orders for these recommended tests are listed in the plan section. The patient has been provided with a written plan.    Health Maintenance   Topic Date Due    Pneumococcal Vaccine 65+ (1 - PCV) Never done    TDAP/TD VACCINES (1 - Tdap) Never done    HEPATITIS C SCREENING  Never done    URINE MICROALBUMIN  06/03/2021    COVID-19 Vaccine (5 - Pfizer series) 09/16/2022    DIABETIC EYE EXAM  11/07/2023 (Originally 2/13/2020)    INFLUENZA VACCINE  10/01/2023    HEMOGLOBIN A1C  11/03/2023    LIPID PANEL  05/03/2024    ANNUAL WELLNESS VISIT  09/19/2024    DIABETIC FOOT EXAM  09/19/2024    BMI FOLLOWUP  09/19/2024    COLORECTAL CANCER SCREENING  10/07/2025    ZOSTER VACCINE  Completed          CMS Preventative Services Quick Reference  Risk Factors Identified During Encounter    Vision Screening Recommended    The above risks/problems have been discussed with the patient.  Pertinent information has been shared with the patient in the After Visit Summary.  An After Visit Summary and PPPS were made available to the patient.  Diagnoses and all orders for this visit:    1. Medicare annual wellness visit, subsequent (Primary)  Assessment & Plan:  Counseling was provided on nutrition, physical activity, development, and injury prevention, dental health, and safe sex practices patient verbalizes understanding no additional questions were asked.           2. Type 2 diabetes mellitus without complication, with long-term current use of  insulin  Assessment & Plan:  Diabetes is improving with treatment.   Dietary recommendations for ADA diet.  Discussed foot care.  Reminded to get yearly retinal exam.  Medication changes per orders.  Ophthalmology referral.  Diabetes will be reassessed in 6 months.      Orders:  -     Comprehensive Metabolic Panel  -     Hemoglobin A1c  -     MicroAlbumin, Urine, Random - Urine, Clean Catch  -     Semaglutide,0.25 or 0.5MG/DOS, (OZEMPIC) 2 MG/1.5ML solution pen-injector; Inject 0.25 mg under the skin into the appropriate area as directed 1 (One) Time Per Week for 28 days.  Dispense: 3 mL; Refill: 2  -     glimepiride (AMARYL) 2 MG tablet; Take 2 tablets by mouth Every Night.  Dispense: 180 tablet; Refill: 3  -     glucose blood test strip; DX E11.9 check bs daily accu check guide strips  Dispense: 100 each; Refill: 3  -     Lancets misc; DX E11.9 Check bs once daily  Dispense: 100 each; Refill: 1  -     metFORMIN (GLUCOPHAGE) 1000 MG tablet; Take 1 tablet by mouth 2 (Two) Times a Day With Meals.  Dispense: 180 tablet; Refill: 0  -     pioglitazone (Actos) 15 MG tablet; Take 1 tablet by mouth Daily for 90 days.  Dispense: 30 tablet; Refill: 2    3. Environmental and seasonal allergies  Assessment & Plan:  Patient reports having to take multiple medications for seasonal allergy symptoms.  But patient refused sinus x-ray today.  Patient agreed to start Singulair.    Orders:  -     fexofenadine (ALLEGRA) 180 MG tablet; Take 1 tablet by mouth Daily.  Dispense: 90 tablet; Refill: 1  -     montelukast (Singulair) 10 MG tablet; Take 1 tablet by mouth Every Night.  Dispense: 30 tablet; Refill: 5    4. Primary hypertension  Assessment & Plan:  Hypertension is improving with treatment.  Continue current treatment regimen.  Dietary sodium restriction.  Blood pressure will be reassessed in 3 months.    Orders:  -     Comprehensive Metabolic Panel  -     amLODIPine (NORVASC) 10 MG tablet; Take 1 tablet by mouth Daily.  Dispense:  90 tablet; Refill: 0  -     losartan (COZAAR) 100 MG tablet; Take 1 tablet by mouth Daily.  Dispense: 90 tablet; Refill: 3    5. Gastroesophageal reflux disease without esophagitis  -     esomeprazole (nexIUM) 20 MG capsule; Take 1 capsule by mouth Every Morning.  Dispense: 90 capsule; Refill: 1    6. RLS (restless legs syndrome)  Assessment & Plan:  stable      7. Elevated liver enzymes    8. Hyperlipidemia  Assessment & Plan:  Lipid abnormalities are improving with lifestyle modifications.  Nutritional counseling was provided.  Lipids will be reassessed in 6 months.  Discussed the importance of healthy diet, nutrition, and lifestyle. Recommend low salt, fat/cholesterol diet and avoid concentrated sweets. Encouraged DASH diet along with fresh fruits & vegetables and low fat dairy products. Counseled patient to exercise/walk as tolerated. Avoid tobacco and alcohol use.        9. Anemia    10. Prostate cancer screening  -     PSA Screen    11. Need for hepatitis C screening test  -     Hepatitis C Antibody    12. History of laparoscopic cholecystectomy    13. Bilateral leg edema    Other orders  -     multivitamin with minerals (MULTIVITAMIN ADULT PO); Take 1 tablet by mouth Every Night.  Dispense: 90 tablet; Refill: 2      Follow Up:  Next Medicare Wellness visit to be scheduled in 1 year.        Additional E&M Note during same encounter follows:  Patient has multiple medical problems which are significant and separately identifiable that require additional work above and beyond the Medicare Wellness Visit.      Chief Complaint  Diabetes (Didn't try the new RX from research on meds or cost of meds) and Medicare Wellness-Initial Visit    Subjective        HPI  Bob Escamilla is also being seen today for hypertension, diabetes, hyperlipidemia and other complaints.         Objective   Vital Signs:  /78 (BP Location: Left arm, Patient Position: Sitting, Cuff Size: Large Adult)   Pulse 66   Temp 97.1 °F (36.2  "°C) (Infrared)   Resp 18   Ht 167.6 cm (66\")   Wt 108 kg (237 lb)   SpO2 95%   BMI 38.25 kg/m²     Physical Exam     The following data was reviewed by: ANGIE Tang on 09/19/2023:  Common labs          11/2/2022    08:15 5/3/2023    08:24   Common Labs   Glucose 125  137    BUN 15  13    Creatinine 0.89  0.78    Sodium 138  137    Potassium 4.5  4.5    Chloride 103  100    Calcium 9.4  9.8    Total Protein  6.9    Albumin 4.80  4.6    Total Bilirubin 0.6  0.3    Alkaline Phosphatase 80  70    AST (SGOT) 35  27    ALT (SGPT) 46  46    WBC 7.90  6.79    Hemoglobin 13.8  14.5    Hematocrit 42.0  41.0    Platelets 263  256    Total Cholesterol 154     Total Cholesterol  148    Triglycerides 274  150    HDL Cholesterol 33  38    LDL Cholesterol  76  84    Hemoglobin A1C 6.40  7.10      Data reviewed : Radiologic studies gallbladder ultrasound and Consultant notes General surgery note indicating laparoscopic cholecystectomy.           Assessment and Plan   Diagnoses and all orders for this visit:    1. Medicare annual wellness visit, subsequent (Primary)  Assessment & Plan:  Counseling was provided on nutrition, physical activity, development, and injury prevention, dental health, and safe sex practices patient verbalizes understanding no additional questions were asked.           2. Type 2 diabetes mellitus without complication, with long-term current use of insulin  Assessment & Plan:  Diabetes is improving with treatment.   Dietary recommendations for ADA diet.  Discussed foot care.  Reminded to get yearly retinal exam.  Medication changes per orders.  Ophthalmology referral.  Diabetes will be reassessed in 6 months.      Orders:  -     Comprehensive Metabolic Panel  -     Hemoglobin A1c  -     MicroAlbumin, Urine, Random - Urine, Clean Catch  -     Semaglutide,0.25 or 0.5MG/DOS, (OZEMPIC) 2 MG/1.5ML solution pen-injector; Inject 0.25 mg under the skin into the appropriate area as directed 1 " (One) Time Per Week for 28 days.  Dispense: 3 mL; Refill: 2  -     glimepiride (AMARYL) 2 MG tablet; Take 2 tablets by mouth Every Night.  Dispense: 180 tablet; Refill: 3  -     glucose blood test strip; DX E11.9 check bs daily accu check guide strips  Dispense: 100 each; Refill: 3  -     Lancets misc; DX E11.9 Check bs once daily  Dispense: 100 each; Refill: 1  -     metFORMIN (GLUCOPHAGE) 1000 MG tablet; Take 1 tablet by mouth 2 (Two) Times a Day With Meals.  Dispense: 180 tablet; Refill: 0  -     pioglitazone (Actos) 15 MG tablet; Take 1 tablet by mouth Daily for 90 days.  Dispense: 30 tablet; Refill: 2    3. Environmental and seasonal allergies  Assessment & Plan:  Patient reports having to take multiple medications for seasonal allergy symptoms.  But patient refused sinus x-ray today.  Patient agreed to start Singulair.    Orders:  -     fexofenadine (ALLEGRA) 180 MG tablet; Take 1 tablet by mouth Daily.  Dispense: 90 tablet; Refill: 1  -     montelukast (Singulair) 10 MG tablet; Take 1 tablet by mouth Every Night.  Dispense: 30 tablet; Refill: 5    4. Primary hypertension  Assessment & Plan:  Hypertension is improving with treatment.  Continue current treatment regimen.  Dietary sodium restriction.  Blood pressure will be reassessed in 3 months.    Orders:  -     Comprehensive Metabolic Panel  -     amLODIPine (NORVASC) 10 MG tablet; Take 1 tablet by mouth Daily.  Dispense: 90 tablet; Refill: 0  -     losartan (COZAAR) 100 MG tablet; Take 1 tablet by mouth Daily.  Dispense: 90 tablet; Refill: 3    5. Gastroesophageal reflux disease without esophagitis  -     esomeprazole (nexIUM) 20 MG capsule; Take 1 capsule by mouth Every Morning.  Dispense: 90 capsule; Refill: 1    6. RLS (restless legs syndrome)  Assessment & Plan:  stable      7. Elevated liver enzymes    8. Hyperlipidemia  Assessment & Plan:  Lipid abnormalities are improving with lifestyle modifications.  Nutritional counseling was provided.  Lipids  will be reassessed in 6 months.  Discussed the importance of healthy diet, nutrition, and lifestyle. Recommend low salt, fat/cholesterol diet and avoid concentrated sweets. Encouraged DASH diet along with fresh fruits & vegetables and low fat dairy products. Counseled patient to exercise/walk as tolerated. Avoid tobacco and alcohol use.        9. Anemia    10. Prostate cancer screening  -     PSA Screen    11. Need for hepatitis C screening test  -     Hepatitis C Antibody    12. History of laparoscopic cholecystectomy    13. Bilateral leg edema    Other orders  -     multivitamin with minerals (MULTIVITAMIN ADULT PO); Take 1 tablet by mouth Every Night.  Dispense: 90 tablet; Refill: 2             Follow Up   Return in about 6 months (around 3/19/2024).  Patient was given instructions and counseling regarding his condition or for health maintenance advice. Please see specific information pulled into the AVS if appropriate.

## 2023-09-19 NOTE — ASSESSMENT & PLAN NOTE
Counseling was provided on nutrition, physical activity, development, and injury prevention, dental health, and safe sex practices patient verbalizes understanding no additional questions were asked.

## 2023-09-19 NOTE — ASSESSMENT & PLAN NOTE
Patient reports having to take multiple medications for seasonal allergy symptoms.  But patient refused sinus x-ray today.  Patient agreed to start Singulair.

## 2023-09-19 NOTE — ASSESSMENT & PLAN NOTE
Diabetes is improving with treatment.   Dietary recommendations for ADA diet.  Discussed foot care.  Reminded to get yearly retinal exam.  Medication changes per orders.  Ophthalmology referral.  Diabetes will be reassessed in 6 months.

## 2023-09-19 NOTE — TELEPHONE ENCOUNTER
Caller: Bob Escamilla    Relationship: Self    Best call back number: pioglitazone (Actos) 15 MG tablet     Who are you requesting to speak with (clinical staff, provider,  specific staff member): CLINICAL STAFF    What was the call regarding: THE PATIENT WAS SEEN TODAY AND HAS QUESTIONS REGARDING  Semaglutide,0.25 or 0.5MG/DOS, (OZEMPIC) 2 MG/1.5ML solution pen-injector   AND  pioglitazone (Actos) 15 MG tablet   THAT HE WANTS TO SPEAK TO CLINICAL STAFF ABOUT. THE MEDICATION IS VERY EXPENSIVE

## 2023-09-20 DIAGNOSIS — Z11.59 ENCOUNTER FOR SCREENING FOR OTHER VIRAL DISEASES: ICD-10-CM

## 2023-09-20 DIAGNOSIS — R74.8 ELEVATED LIVER ENZYMES: Primary | ICD-10-CM

## 2023-09-20 LAB
ALBUMIN SERPL-MCNC: 4.7 G/DL (ref 3.5–5.2)
ALBUMIN/GLOB SERPL: 2 G/DL
ALP SERPL-CCNC: 67 U/L (ref 39–117)
ALT SERPL-CCNC: 55 U/L (ref 1–41)
AST SERPL-CCNC: 34 U/L (ref 1–40)
BILIRUB SERPL-MCNC: 0.5 MG/DL (ref 0–1.2)
BUN SERPL-MCNC: 15 MG/DL (ref 8–23)
BUN/CREAT SERPL: 21.1 (ref 7–25)
CALCIUM SERPL-MCNC: 9.9 MG/DL (ref 8.6–10.5)
CHLORIDE SERPL-SCNC: 102 MMOL/L (ref 98–107)
CO2 SERPL-SCNC: 25.2 MMOL/L (ref 22–29)
CREAT SERPL-MCNC: 0.71 MG/DL (ref 0.76–1.27)
EGFRCR SERPLBLD CKD-EPI 2021: 101.2 ML/MIN/1.73
GLOBULIN SER CALC-MCNC: 2.4 GM/DL
GLUCOSE SERPL-MCNC: 136 MG/DL (ref 65–99)
HBA1C MFR BLD: 7.1 % (ref 4.8–5.6)
MICROALBUMIN UR-MCNC: 29.2 UG/ML
POTASSIUM SERPL-SCNC: 4.3 MMOL/L (ref 3.5–5.2)
PROT SERPL-MCNC: 7.1 G/DL (ref 6–8.5)
PSA SERPL-MCNC: 0.25 NG/ML (ref 0–4)
SODIUM SERPL-SCNC: 139 MMOL/L (ref 136–145)

## 2023-09-21 LAB
HBV E AG SERPL QL IA: NEGATIVE
HBV SURFACE AB SER QL: NON REACTIVE
WRITTEN AUTHORIZATION: NORMAL

## 2023-11-13 DIAGNOSIS — E11.9 TYPE 2 DIABETES MELLITUS WITHOUT COMPLICATION, WITH LONG-TERM CURRENT USE OF INSULIN: Chronic | ICD-10-CM

## 2023-11-13 DIAGNOSIS — Z79.4 TYPE 2 DIABETES MELLITUS WITHOUT COMPLICATION, WITH LONG-TERM CURRENT USE OF INSULIN: Chronic | ICD-10-CM

## 2023-11-13 DIAGNOSIS — J30.89 ENVIRONMENTAL AND SEASONAL ALLERGIES: Chronic | ICD-10-CM

## 2023-11-13 RX ORDER — MONTELUKAST SODIUM 10 MG/1
10 TABLET ORAL NIGHTLY
Qty: 90 TABLET | Refills: 1 | Status: SHIPPED | OUTPATIENT
Start: 2023-11-13

## 2023-11-13 RX ORDER — PIOGLITAZONEHYDROCHLORIDE 15 MG/1
15 TABLET ORAL DAILY
Qty: 90 TABLET | Refills: 0 | Status: SHIPPED | OUTPATIENT
Start: 2023-11-13 | End: 2024-02-11

## 2023-11-13 NOTE — TELEPHONE ENCOUNTER
Caller: Bob Escamilla    Relationship: Self    Best call back number:     [phone]       Requested Prescriptions:   Requested Prescriptions     Pending Prescriptions Disp Refills    montelukast (Singulair) 10 MG tablet 30 tablet 5     Sig: Take 1 tablet by mouth Every Night.    pioglitazone (Actos) 15 MG tablet 30 tablet 2     Sig: Take 1 tablet by mouth Daily for 90 days.        Pharmacy where request should be sent: [facility] [id number] [place] [id number] [address] [facility] & ([address] )  [phone] [facility] [phone]      Last office visit with prescribing clinician: 9/19/2023   Last telemedicine visit with prescribing clinician: Visit date not found   Next office visit with prescribing clinician: 4/2/2024     Additional details provided by patient:     PATIENT IS WANTING TO CHANGE THE SCRIPT FROM 30 DAY TO 90 DAY    Does the patient have less than a 3 day supply:  [] Yes  [x] No    Would you like a call back once the refill request has been completed: [] Yes [] No    If the office needs to give you a call back, can they leave a voicemail: [] Yes [] No    Willy Stapleton Rep   11/13/23 07:49 EST

## 2023-11-14 DIAGNOSIS — Z79.4 TYPE 2 DIABETES MELLITUS WITHOUT COMPLICATION, WITH LONG-TERM CURRENT USE OF INSULIN: Chronic | ICD-10-CM

## 2023-11-14 DIAGNOSIS — E11.9 TYPE 2 DIABETES MELLITUS WITHOUT COMPLICATION, WITH LONG-TERM CURRENT USE OF INSULIN: Chronic | ICD-10-CM

## 2023-11-14 NOTE — TELEPHONE ENCOUNTER
Caller: Bob Escamilla    Relationship: Self    Best call back number: [phone]    Requested Prescriptions:   Requested Prescriptions     Pending Prescriptions Disp Refills    glucose blood test strip 100 each 3     Sig: DX E11.9 check bs daily accu check guide strips        Pharmacy where request should be sent: [facility] [id number] [facility] [address] [facility])  [phone] [facility] [phone]      Last office visit with prescribing clinician: 9/19/2023   Last telemedicine visit with prescribing clinician: Visit date not found   Next office visit with prescribing clinician: 4/2/2024     Additional details provided by patient: PATIENT REQUESTED THIS BE SENT  EACH.    Does the patient have less than a 3 day supply:  [] Yes  [] No    Would you like a call back once the refill request has been completed: [x] Yes [] No    If the office needs to give you a call back, can they leave a voicemail: [x] Yes [] No    Willy Owens Rep   11/14/23 08:35 EST

## 2023-11-20 DIAGNOSIS — I10 PRIMARY HYPERTENSION: Chronic | ICD-10-CM

## 2023-11-20 DIAGNOSIS — Z79.4 TYPE 2 DIABETES MELLITUS WITHOUT COMPLICATION, WITH LONG-TERM CURRENT USE OF INSULIN: Chronic | ICD-10-CM

## 2023-11-20 DIAGNOSIS — E11.9 TYPE 2 DIABETES MELLITUS WITHOUT COMPLICATION, WITH LONG-TERM CURRENT USE OF INSULIN: Chronic | ICD-10-CM

## 2023-11-20 RX ORDER — AMLODIPINE BESYLATE 10 MG/1
10 TABLET ORAL DAILY
Qty: 90 TABLET | Refills: 0 | Status: SHIPPED | OUTPATIENT
Start: 2023-11-20

## 2024-01-09 DIAGNOSIS — E11.9 TYPE 2 DIABETES MELLITUS WITHOUT COMPLICATION, WITH LONG-TERM CURRENT USE OF INSULIN: Chronic | ICD-10-CM

## 2024-01-09 DIAGNOSIS — Z79.4 TYPE 2 DIABETES MELLITUS WITHOUT COMPLICATION, WITH LONG-TERM CURRENT USE OF INSULIN: Chronic | ICD-10-CM

## 2024-01-09 LAB — HBA1C MFR BLD: 7.3 % (ref 4.8–5.6)

## 2024-01-10 ENCOUNTER — TELEPHONE (OUTPATIENT)
Dept: FAMILY MEDICINE CLINIC | Facility: CLINIC | Age: 69
End: 2024-01-10
Payer: MEDICARE

## 2024-01-10 NOTE — TELEPHONE ENCOUNTER
Caller: Bob Escamilla    Relationship: Self    Best call back number: [phone]     What form or medical record are you requesting: BLOOD WORK TEST RESULT    Who is requesting this form or medical record from you: [facility].     How would you like to receive the form or medical records (pick-up, mail, fax):       If fax, what is the fax number: [phone]      Timeframe paperwork needed: AS SOON AS POSSIBLE (TODAY IF POSSIBLE BEFORE LUNCH PER PATIENT)    Additional notes:     PATIENT IS NEEDING HIS BLOOD WORK RESULTS FAXED OVER TO THE [facility]. OFFICE / BUILDING NEXT DOOR.  THEY WILL NOT TAKE HAND TO HAND DELIVERY HAS TO BE FAXED.

## 2024-01-26 ENCOUNTER — TELEPHONE (OUTPATIENT)
Dept: FAMILY MEDICINE CLINIC | Facility: CLINIC | Age: 69
End: 2024-01-26

## 2024-01-26 ENCOUNTER — OFFICE VISIT (OUTPATIENT)
Dept: FAMILY MEDICINE CLINIC | Facility: CLINIC | Age: 69
End: 2024-01-26
Payer: MEDICARE

## 2024-01-26 VITALS
TEMPERATURE: 98.6 F | DIASTOLIC BLOOD PRESSURE: 74 MMHG | HEIGHT: 66 IN | WEIGHT: 247.2 LBS | OXYGEN SATURATION: 96 % | HEART RATE: 60 BPM | BODY MASS INDEX: 39.73 KG/M2 | SYSTOLIC BLOOD PRESSURE: 154 MMHG

## 2024-01-26 DIAGNOSIS — Z79.4 TYPE 2 DIABETES MELLITUS WITHOUT COMPLICATION, WITH LONG-TERM CURRENT USE OF INSULIN: Chronic | ICD-10-CM

## 2024-01-26 DIAGNOSIS — D64.9 ANEMIA, UNSPECIFIED TYPE: ICD-10-CM

## 2024-01-26 DIAGNOSIS — G25.81 RLS (RESTLESS LEGS SYNDROME): Chronic | ICD-10-CM

## 2024-01-26 DIAGNOSIS — E11.9 TYPE 2 DIABETES MELLITUS WITHOUT COMPLICATION, WITH LONG-TERM CURRENT USE OF INSULIN: Chronic | ICD-10-CM

## 2024-01-26 DIAGNOSIS — E78.2 MIXED HYPERLIPIDEMIA: Chronic | ICD-10-CM

## 2024-01-26 DIAGNOSIS — K52.9 CHRONIC DIARRHEA: Primary | Chronic | ICD-10-CM

## 2024-01-26 DIAGNOSIS — I10 PRIMARY HYPERTENSION: Chronic | ICD-10-CM

## 2024-01-26 DIAGNOSIS — J30.89 ENVIRONMENTAL AND SEASONAL ALLERGIES: Chronic | ICD-10-CM

## 2024-01-26 DIAGNOSIS — Z90.49 HX OF CHOLECYSTECTOMY: ICD-10-CM

## 2024-01-26 DIAGNOSIS — Z11.59 ENCOUNTER FOR SCREENING FOR OTHER VIRAL DISEASES: ICD-10-CM

## 2024-01-26 DIAGNOSIS — R74.8 ELEVATED LIVER ENZYMES: Chronic | ICD-10-CM

## 2024-01-26 DIAGNOSIS — Z90.49 HISTORY OF LAPAROSCOPIC CHOLECYSTECTOMY: ICD-10-CM

## 2024-01-26 PROBLEM — Z00.00 MEDICARE ANNUAL WELLNESS VISIT, SUBSEQUENT: Status: RESOLVED | Noted: 2023-09-19 | Resolved: 2024-01-26

## 2024-01-26 RX ORDER — MONTELUKAST SODIUM 10 MG/1
10 TABLET ORAL NIGHTLY
Qty: 90 TABLET | Refills: 1 | Status: SHIPPED | OUTPATIENT
Start: 2024-01-26

## 2024-01-26 RX ORDER — ROPINIROLE 1 MG/1
1 TABLET, FILM COATED ORAL NIGHTLY
Qty: 90 TABLET | Refills: 1 | Status: SHIPPED | OUTPATIENT
Start: 2024-01-26

## 2024-01-26 RX ORDER — DULAGLUTIDE 0.75 MG/.5ML
0.75 INJECTION, SOLUTION SUBCUTANEOUS WEEKLY
Qty: 3 ML | Refills: 2 | Status: SHIPPED | OUTPATIENT
Start: 2024-01-26

## 2024-01-26 RX ORDER — LOSARTAN POTASSIUM 100 MG/1
100 TABLET ORAL DAILY
Qty: 90 TABLET | Refills: 1 | Status: SHIPPED | OUTPATIENT
Start: 2024-01-26

## 2024-01-26 RX ORDER — PIOGLITAZONEHYDROCHLORIDE 30 MG/1
15 TABLET ORAL DAILY
Qty: 90 TABLET | Refills: 1 | Status: SHIPPED | OUTPATIENT
Start: 2024-01-26

## 2024-01-26 RX ORDER — CHOLESTYRAMINE 4 G/9G
1 POWDER, FOR SUSPENSION ORAL 2 TIMES DAILY WITH MEALS
Qty: 60 EACH | Refills: 1 | Status: SHIPPED | OUTPATIENT
Start: 2024-01-26

## 2024-01-26 RX ORDER — AMLODIPINE BESYLATE 10 MG/1
10 TABLET ORAL DAILY
Qty: 90 TABLET | Refills: 1 | Status: SHIPPED | OUTPATIENT
Start: 2024-01-26

## 2024-01-26 RX ORDER — LANCETS 30 GAUGE
EACH MISCELLANEOUS
Qty: 100 EACH | Refills: 1 | Status: SHIPPED | OUTPATIENT
Start: 2024-01-26

## 2024-01-26 NOTE — ASSESSMENT & PLAN NOTE
Hypertension is unchanged.  Continue current treatment regimen.  Dietary sodium restriction.  Weight loss.  Regular aerobic exercise.  Medication changes per orders.  Ambulatory blood pressure monitoring.  Blood pressure will be reassessed in 3 months.

## 2024-01-26 NOTE — TELEPHONE ENCOUNTER
Caller: COLT    Relationship: Other--CARELOLONI    Best call back number: [phone]         What was the call regarding:     PRIOR  AUTHORIZATION    CASE NUMBER [id number]    Dulaglutide (Trulicity) 0.75 MG/0.5ML solution pen-injector     SHE HAS SOME CLINICAL QUESTIONS THAT NEED TO BE ANSWERED      PLEASE RETURN HER CALL

## 2024-01-26 NOTE — ASSESSMENT & PLAN NOTE
Diabetes is unchanged.   Reminded to bring in blood sugar diary at next visit.  Dietary recommendations for ADA diet.  Discussed foot care.  Reminded to get yearly retinal exam.  Medication changes per orders.  Diabetes will be reassessed in 3 months.  Stop glimepiride, increase Actos.  Added Trulicity.  Discussed the importance of healthy diet, nutrition, and lifestyle. Recommend low salt, fat/cholesterol diet and avoid concentrated sweets. Encouraged DASH diet along with fresh fruits & vegetables and low fat dairy products. Counseled patient to exercise/walk as tolerated. Avoid tobacco and alcohol use.

## 2024-01-26 NOTE — ASSESSMENT & PLAN NOTE
Lipid abnormalities are unchanged.  Nutritional counseling was provided. and Pharmacotherapy as ordered.  Lipids will be reassessed in 6 months.  Discussed the importance of healthy diet, nutrition, and lifestyle. Recommend low salt, fat/cholesterol diet and avoid concentrated sweets. Encouraged DASH diet along with fresh fruits & vegetables and low fat dairy products. Counseled patient to exercise/walk as tolerated. Avoid tobacco and alcohol use.

## 2024-01-26 NOTE — PROGRESS NOTES
Chief Complaint  Diarrhea (Chronic-one year/Taking imodium almost daily/Gallbladder removed 8/22/GI referral/)    Subjective        Bob Escamilla presents to [facility]  History of Present Illness  68-year-old white male with obesity, hypertension, hyperlipidemia here complaining of diarrhea.    Pt c/o diarrhea for 1.5 year since his CCY.  Patient reports he has noted that his diarrhea has started status post cholecystectomy about 1-1/2 years ago.  Patient reports he is a  and diarrhea causes issues.  Patient reports he gets 2 stools per day none of them are formed.  The first stool might be semiformed and the other 1 is totally liquid.  Discussed celiac and GI referral.  Also discussed cholestyramine for postcholecystectomy related diarrhea, patient voiced understanding.    Also discussed elevated LFTs and the need to do a hepatitis panel to rule out hepatitis B and C, patient voiced understanding.  Pt states s/p pneumonia and shingrix shots in 2023.    Patient states he had a A1c done at [facility] recently discussed A1c of 7.3 with patient.  Instructed patient to stop glimepiride for risk of hypoglycemia especially related to age.  Discussed increasing dose of Actos from 15 mg once a day to 30 mg daily.  Also discussed Trulicity weekly injectable for diabetes and weight loss however patient concern regarding formulary issues with his insurance.      Diarrhea   This is a chronic problem. The current episode started more than 1 year ago. The problem occurs less than 2 times per day. The problem has been unchanged. The stool consistency is described as Watery. The patient states that diarrhea does not awaken him from sleep. Associated symptoms include abdominal pain and vomiting. Pertinent negatives include no bloating, coughing, fever, headaches, sweats or weight loss. He has tried anti-motility drug for the symptoms. The treatment provided no relief. His past medical  "history is significant for a recent abdominal surgery. s/p ccy 1.5 Years ago       Objective   Vital Signs:  /74   Pulse 60   Temp 98.6 °F (37 °C)   Ht 167.6 cm (65.98\")   Wt 112 kg (247 lb 3.2 oz)   SpO2 96%   BMI 39.92 kg/m²   Estimated body mass index is 39.92 kg/m² as calculated from the following:    Height as of this encounter: 167.6 cm (65.98\").    Weight as of this encounter: 112 kg (247 lb 3.2 oz).               Physical Exam  Constitutional:       Appearance: Normal appearance.   HENT:      Head: Normocephalic and atraumatic.   Eyes:      Conjunctiva/sclera: Conjunctivae normal.   Cardiovascular:      Rate and Rhythm: Normal rate and regular rhythm.      Heart sounds: Normal heart sounds.   Pulmonary:      Effort: Pulmonary effort is normal.      Breath sounds: Normal breath sounds.   Abdominal:      General: Bowel sounds are normal.      Palpations: Abdomen is soft.      Comments: Non-tender   Skin:     General: Skin is warm.   Neurological:      General: No focal deficit present.      Mental Status: He is alert and oriented to person, place, and time.   Psychiatric:         Mood and Affect: Mood normal.         Behavior: Behavior normal.        Result Review :    The following data was reviewed by: ANGIE Tang on 01/26/2024:  CMP          5/3/2023    08:24 9/19/2023    08:58   CMP   Glucose 137  136    BUN 13  15    Creatinine 0.78  0.71    Sodium 137  139    Potassium 4.5  4.3    Chloride 100  102    Calcium 9.8  9.9    Total Protein 6.9  7.1    Albumin 4.6  4.7    Globulin 2.3  2.4    Total Bilirubin 0.3  0.5    Alkaline Phosphatase 70  67    AST (SGOT) 27  34    ALT (SGPT) 46  55    BUN/Creatinine Ratio 16.7  21.1      CBC w/diff          5/3/2023    08:24   CBC w/Diff   WBC 6.79    RBC 4.49    Hemoglobin 14.5    Hematocrit 41.0    MCV 91.3    MCH 32.3    MCHC 35.4    RDW 12.9    Platelets 256    Neutrophil Rel % 57.6    Lymphocyte Rel % 29.6    Monocyte Rel % 8.1  "   Eosinophil Rel % 3.5    Basophil Rel % 0.9      Lipid Panel          5/3/2023    08:24   Lipid Panel   Total Cholesterol 148    Triglycerides 150    HDL Cholesterol 38    VLDL Cholesterol 26    LDL Cholesterol  84        A1C Last 3 Results          5/3/2023    08:24 9/19/2023    08:58 1/9/2024    08:34   HGBA1C Last 3 Results   Hemoglobin A1C 7.10  7.10  7.30      Microalbumin          9/19/2023    08:58   Microalbumin   Microalbumin, Urine 29.2                   Assessment and Plan     Diagnoses and all orders for this visit:    1. Chronic diarrhea (Primary)  -     Celiac Disease Panel  -     cholestyramine (Questran) 4 g packet; Take 1 packet by mouth 2 (Two) Times a Day With Meals.  Dispense: 60 each; Refill: 1  -     Ambulatory Referral to Gastroenterology    2. Hx of cholecystectomy    3. Elevated liver enzymes  Assessment & Plan:  Will check hepatitis panel today.    Orders:  -     Hepatitis B Core Ab W/Reflex  -     Hepatitis B Surface Antibody  -     Hepatitis C Antibody  -     Hepatitis B Surface Antigen    4. Type 2 diabetes mellitus without complication, with long-term current use of insulin  Assessment & Plan:  Diabetes is unchanged.   Reminded to bring in blood sugar diary at next visit.  Dietary recommendations for ADA diet.  Discussed foot care.  Reminded to get yearly retinal exam.  Medication changes per orders.  Diabetes will be reassessed in 3 months.  Stop glimepiride, increase Actos.  Added Trulicity.  Discussed the importance of healthy diet, nutrition, and lifestyle. Recommend low salt, fat/cholesterol diet and avoid concentrated sweets. Encouraged DASH diet along with fresh fruits & vegetables and low fat dairy products. Counseled patient to exercise/walk as tolerated. Avoid tobacco and alcohol use.      Orders:  -     Microalbumin / Creatinine Urine Ratio - Urine, Clean Catch  -     pioglitazone (Actos) 30 MG tablet; Take 0.5 tablets by mouth Daily.  Dispense: 90 tablet; Refill: 1  -      Dulaglutide (Trulicity) 0.75 MG/0.5ML solution pen-injector; Inject 0.75 mg under the skin into the appropriate area as directed 1 (One) Time Per Week.  Dispense: 3 mL; Refill: 2  -     glucose blood test strip; DX E11.9 check bs daily accu check guide strips  Dispense: 100 each; Refill: 3  -     metFORMIN (GLUCOPHAGE) 1000 MG tablet; Take 1 tablet by mouth 2 (Two) Times a Day With Meals.  Dispense: 180 tablet; Refill: 1  -     Lancets misc; DX E11.9 Check bs once daily  Dispense: 100 each; Refill: 1    5. Primary hypertension  Assessment & Plan:  Hypertension is unchanged.  Continue current treatment regimen.  Dietary sodium restriction.  Weight loss.  Regular aerobic exercise.  Medication changes per orders.  Ambulatory blood pressure monitoring.  Blood pressure will be reassessed in 3 months.    Orders:  -     Microalbumin / Creatinine Urine Ratio - Urine, Clean Catch  -     Basic metabolic panel  -     losartan (COZAAR) 100 MG tablet; Take 1 tablet by mouth Daily.  Dispense: 90 tablet; Refill: 1  -     amLODIPine (NORVASC) 10 MG tablet; Take 1 tablet by mouth Daily.  Dispense: 90 tablet; Refill: 1    6. Mixed hyperlipidemia  Assessment & Plan:  Lipid abnormalities are unchanged.  Nutritional counseling was provided. and Pharmacotherapy as ordered.  Lipids will be reassessed in 6 months.  Discussed the importance of healthy diet, nutrition, and lifestyle. Recommend low salt, fat/cholesterol diet and avoid concentrated sweets. Encouraged DASH diet along with fresh fruits & vegetables and low fat dairy products. Counseled patient to exercise/walk as tolerated. Avoid tobacco and alcohol use.        7. History of laparoscopic cholecystectomy    8. Anemia, unspecified type  -     CBC w AUTO Differential    9. Encounter for screening for other viral diseases  -     Hepatitis B Core Ab W/Reflex  -     Hepatitis B Surface Antibody  -     Hepatitis B Surface Antigen    10. Environmental and seasonal allergies  Assessment  & Plan:  Stable    Orders:  -     montelukast (Singulair) 10 MG tablet; Take 1 tablet by mouth Every Night.  Dispense: 90 tablet; Refill: 1    11. RLS (restless legs syndrome)  Assessment & Plan:  Stable on ropinirole    Orders:  -     rOPINIRole (REQUIP) 1 MG tablet; Take 1 tablet by mouth Every Night.  Dispense: 90 tablet; Refill: 1             Follow Up     Return in about 3 months (around 4/26/2024) for Next scheduled follow up.  Patient was given instructions and counseling regarding his condition or for health maintenance advice. Please see specific information pulled into the AVS if appropriate.

## 2024-01-26 NOTE — PATIENT INSTRUCTIONS
- stop Glimeperide.  - increased actos to 30mg daily,  -will prescribe Trulicity injection for diabetes and wt loss.  - Cholestyramine twice daily for diarrhea

## 2024-01-26 NOTE — TELEPHONE ENCOUNTER
Caller: Bob Escamilla    Relationship: Self    Best call back number: [phone]     What is the medical concern/diagnosis: ON GOING DIARRHEA    What specialty or service is being requested: GASTROLOGY    What is the provider, practice or medical service name: [facility]      What is the office phone number: FAX NUMBER [phone]    Any additional details:       PATIENT HAS BEEN HAVING THESE ISSUES FOR A YEAR AND A HALF.  HE HAD GALL BLADDER REMOVAL LAST YEAR.      PLEASE LET PATIENT KNOW IF AND WHEN THIS WAS APPROVED.

## 2024-01-29 LAB
BASOPHILS # BLD AUTO: 0.1 X10E3/UL (ref 0–0.2)
BASOPHILS NFR BLD AUTO: 1 %
BUN SERPL-MCNC: 17 MG/DL (ref 8–27)
BUN/CREAT SERPL: 20 (ref 10–24)
CALCIUM SERPL-MCNC: 9.3 MG/DL (ref 8.6–10.2)
CHLORIDE SERPL-SCNC: 101 MMOL/L (ref 96–106)
CO2 SERPL-SCNC: 22 MMOL/L (ref 20–29)
CREAT SERPL-MCNC: 0.87 MG/DL (ref 0.76–1.27)
EGFRCR SERPLBLD CKD-EPI 2021: 95 ML/MIN/1.73
ENDOMYSIUM IGA SER QL: NEGATIVE
EOSINOPHIL # BLD AUTO: 0.2 X10E3/UL (ref 0–0.4)
EOSINOPHIL NFR BLD AUTO: 3 %
ERYTHROCYTE [DISTWIDTH] IN BLOOD BY AUTOMATED COUNT: 12.8 % (ref 11.6–15.4)
GLUCOSE SERPL-MCNC: 112 MG/DL (ref 70–99)
HBV CORE AB SERPL QL IA: NEGATIVE
HBV SURFACE AB SER QL: NON REACTIVE
HBV SURFACE AG SERPL QL IA: NEGATIVE
HCT VFR BLD AUTO: 39.4 % (ref 37.5–51)
HCV IGG SERPL QL IA: NON REACTIVE
HGB BLD-MCNC: 13.5 G/DL (ref 13–17.7)
IGA SERPL-MCNC: 201 MG/DL (ref 61–437)
IMM GRANULOCYTES # BLD AUTO: 0 X10E3/UL (ref 0–0.1)
IMM GRANULOCYTES NFR BLD AUTO: 0 %
LYMPHOCYTES # BLD AUTO: 2.2 X10E3/UL (ref 0.7–3.1)
LYMPHOCYTES NFR BLD AUTO: 32 %
MCH RBC QN AUTO: 31.8 PG (ref 26.6–33)
MCHC RBC AUTO-ENTMCNC: 34.3 G/DL (ref 31.5–35.7)
MCV RBC AUTO: 93 FL (ref 79–97)
MONOCYTES # BLD AUTO: 0.7 X10E3/UL (ref 0.1–0.9)
MONOCYTES NFR BLD AUTO: 10 %
NEUTROPHILS # BLD AUTO: 3.7 X10E3/UL (ref 1.4–7)
NEUTROPHILS NFR BLD AUTO: 54 %
PLATELET # BLD AUTO: 252 X10E3/UL (ref 150–450)
POTASSIUM SERPL-SCNC: 4.5 MMOL/L (ref 3.5–5.2)
RBC # BLD AUTO: 4.24 X10E6/UL (ref 4.14–5.8)
SODIUM SERPL-SCNC: 139 MMOL/L (ref 134–144)
TTG IGA SER-ACNC: <2 U/ML (ref 0–3)
WBC # BLD AUTO: 6.8 X10E3/UL (ref 3.4–10.8)

## 2024-02-02 ENCOUNTER — TELEPHONE (OUTPATIENT)
Dept: FAMILY MEDICINE CLINIC | Facility: CLINIC | Age: 69
End: 2024-02-02
Payer: MEDICARE

## 2024-02-02 DIAGNOSIS — K52.9 CHRONIC DIARRHEA: Chronic | ICD-10-CM

## 2024-02-02 DIAGNOSIS — Z79.4 TYPE 2 DIABETES MELLITUS WITHOUT COMPLICATION, WITH LONG-TERM CURRENT USE OF INSULIN: Primary | ICD-10-CM

## 2024-02-02 DIAGNOSIS — E11.9 TYPE 2 DIABETES MELLITUS WITHOUT COMPLICATION, WITH LONG-TERM CURRENT USE OF INSULIN: Primary | ICD-10-CM

## 2024-02-02 RX ORDER — METFORMIN HYDROCHLORIDE 500 MG/1
500 TABLET, EXTENDED RELEASE ORAL
Qty: 60 TABLET | Refills: 5 | Status: SHIPPED | OUTPATIENT
Start: 2024-02-02

## 2024-02-02 RX ORDER — PIOGLITAZONEHYDROCHLORIDE 30 MG/1
30 TABLET ORAL DAILY
Qty: 90 TABLET | Refills: 1 | Status: SHIPPED | OUTPATIENT
Start: 2024-02-02

## 2024-02-02 NOTE — TELEPHONE ENCOUNTER
I talked to him and related problems with diarrhea/meds to [facility] and told him id call back next week with answer

## 2024-02-02 NOTE — TELEPHONE ENCOUNTER
Per [facility]  take cholestyramine 2 g instead of 4, actos direction changed to once day,metformin changed to 500 xr bid.

## 2024-02-02 NOTE — TELEPHONE ENCOUNTER
Caller: Bob Escamilla    Relationship: Self    Best call back number: [phone]     Who are you requesting to speak with (clinical staff, provider,  specific staff member): CLINICAL STAFF     What was the call regarding: RETURNING PHONE CALL TO OFFICE

## 2024-02-07 ENCOUNTER — TELEPHONE (OUTPATIENT)
Dept: FAMILY MEDICINE CLINIC | Facility: CLINIC | Age: 69
End: 2024-02-07
Payer: MEDICARE

## 2024-02-07 DIAGNOSIS — E11.9 TYPE 2 DIABETES MELLITUS WITHOUT COMPLICATION, WITH LONG-TERM CURRENT USE OF INSULIN: Chronic | ICD-10-CM

## 2024-02-07 DIAGNOSIS — Z79.4 TYPE 2 DIABETES MELLITUS WITHOUT COMPLICATION, WITH LONG-TERM CURRENT USE OF INSULIN: Chronic | ICD-10-CM

## 2024-02-07 NOTE — TELEPHONE ENCOUNTER
Caller: Bob Escamilla    Relationship: Self    Best call back number: [phone]     Which medication are you concerned about: metFORMIN ER (GLUCOPHAGE-XR) 500 MG 24 hr tablet     What are your concerns:PATIENT CALLING STATING THAT THE INSTRUCTIONS FOR THIS MEDICATION IS INCORRECT. HE STATED THAT HE IS SUPPOSED TO TAKE ONE IN THE MORNING AND ONE AT NIGHT.

## 2024-02-07 NOTE — TELEPHONE ENCOUNTER
Pt advises he gets 102 pin pricks for 90 days and would like to also get 100 test strips for 90 days as well. He states the pharmacy only gives him 50 test strips.

## 2024-02-09 DIAGNOSIS — Z79.4 TYPE 2 DIABETES MELLITUS WITHOUT COMPLICATION, WITH LONG-TERM CURRENT USE OF INSULIN: Chronic | ICD-10-CM

## 2024-02-09 DIAGNOSIS — E11.9 TYPE 2 DIABETES MELLITUS WITHOUT COMPLICATION, WITH LONG-TERM CURRENT USE OF INSULIN: Chronic | ICD-10-CM

## 2024-02-13 ENCOUNTER — TELEPHONE (OUTPATIENT)
Dept: FAMILY MEDICINE CLINIC | Facility: CLINIC | Age: 69
End: 2024-02-13
Payer: MEDICARE

## 2024-02-13 DIAGNOSIS — E11.9 TYPE 2 DIABETES MELLITUS WITHOUT COMPLICATION, WITH LONG-TERM CURRENT USE OF INSULIN: Primary | ICD-10-CM

## 2024-02-13 DIAGNOSIS — Z79.4 TYPE 2 DIABETES MELLITUS WITHOUT COMPLICATION, WITH LONG-TERM CURRENT USE OF INSULIN: Primary | ICD-10-CM

## 2024-02-13 DIAGNOSIS — Z79.4 TYPE 2 DIABETES MELLITUS WITHOUT COMPLICATION, WITH LONG-TERM CURRENT USE OF INSULIN: Chronic | ICD-10-CM

## 2024-02-13 DIAGNOSIS — E11.9 TYPE 2 DIABETES MELLITUS WITHOUT COMPLICATION, WITH LONG-TERM CURRENT USE OF INSULIN: Chronic | ICD-10-CM

## 2024-02-13 RX ORDER — DULAGLUTIDE 0.75 MG/.5ML
0.75 INJECTION, SOLUTION SUBCUTANEOUS WEEKLY
Qty: 3 ML | Refills: 0 | Status: SHIPPED | OUTPATIENT
Start: 2024-02-13

## 2024-02-22 ENCOUNTER — TELEPHONE (OUTPATIENT)
Dept: FAMILY MEDICINE CLINIC | Facility: CLINIC | Age: 69
End: 2024-02-22
Payer: MEDICARE

## 2024-02-22 NOTE — TELEPHONE ENCOUNTER
Caller: Akiko Escamilla    Relationship: Emergency Contact    Best call back number: [phone]     What is the medical concern/diagnosis: DIABETES    What specialty or service is being requested: ENDOCRINOLOGIST    What is the provider, practice or medical service name: DR PORSCHE BLAIR    What is the office location:     [address] [place] [address]      What is the office phone number: [phone]    Any additional details:     SPOUSE GOES TO THIS LOCATION FOR THE SAME REASON AND PATIENT IS WANTING TO SEE THIS DOCTOR AS WELL    THEY WOULD LIKE A CALL BACK TO LET THEM KNOW THAT IT HAS BEEN SENT SO THAT THEY CAN SCHEDULE THE APPOINTMENT

## 2024-02-23 DIAGNOSIS — Z79.4 TYPE 2 DIABETES MELLITUS WITHOUT COMPLICATION, WITH LONG-TERM CURRENT USE OF INSULIN: Primary | ICD-10-CM

## 2024-02-23 DIAGNOSIS — E11.9 TYPE 2 DIABETES MELLITUS WITHOUT COMPLICATION, WITH LONG-TERM CURRENT USE OF INSULIN: Primary | ICD-10-CM

## 2024-02-29 ENCOUNTER — OFFICE VISIT (OUTPATIENT)
Dept: GASTROENTEROLOGY | Facility: CLINIC | Age: 69
End: 2024-02-29
Payer: MEDICARE

## 2024-02-29 VITALS
HEIGHT: 66 IN | SYSTOLIC BLOOD PRESSURE: 142 MMHG | TEMPERATURE: 96.9 F | HEART RATE: 58 BPM | WEIGHT: 236 LBS | OXYGEN SATURATION: 95 % | BODY MASS INDEX: 37.93 KG/M2 | DIASTOLIC BLOOD PRESSURE: 83 MMHG

## 2024-02-29 DIAGNOSIS — K21.9 GASTROESOPHAGEAL REFLUX DISEASE WITHOUT ESOPHAGITIS: ICD-10-CM

## 2024-02-29 DIAGNOSIS — R19.7 DIARRHEA, UNSPECIFIED TYPE: Primary | ICD-10-CM

## 2024-02-29 PROCEDURE — 99214 OFFICE O/P EST MOD 30 MIN: CPT | Performed by: NURSE PRACTITIONER

## 2024-02-29 PROCEDURE — 3079F DIAST BP 80-89 MM HG: CPT | Performed by: NURSE PRACTITIONER

## 2024-02-29 PROCEDURE — 1160F RVW MEDS BY RX/DR IN RCRD: CPT | Performed by: NURSE PRACTITIONER

## 2024-02-29 PROCEDURE — 1159F MED LIST DOCD IN RCRD: CPT | Performed by: NURSE PRACTITIONER

## 2024-02-29 PROCEDURE — 3077F SYST BP >= 140 MM HG: CPT | Performed by: NURSE PRACTITIONER

## 2024-02-29 RX ORDER — TERBINAFINE HYDROCHLORIDE 250 MG/1
TABLET ORAL
COMMUNITY
Start: 2024-02-06

## 2024-02-29 RX ORDER — METFORMIN HYDROCHLORIDE 500 MG/1
TABLET, EXTENDED RELEASE ORAL
COMMUNITY
Start: 2024-02-26

## 2024-02-29 RX ORDER — PANTOPRAZOLE SODIUM 40 MG/1
40 TABLET, DELAYED RELEASE ORAL DAILY
Qty: 90 TABLET | Refills: 3 | Status: SHIPPED | OUTPATIENT
Start: 2024-02-29

## 2024-02-29 NOTE — PROGRESS NOTES
Chief Complaint   Patient presents with    Diarrhea       HPI    Bob Escamilla is a  68 y.o. male here for a follow up visit for diarrhea.    This patient follows with Dr. Hogan, new to me.    Past medical history of asthma, arthritis, diabetes, GERD, hypertension along with sleep apnea.    Patient reports 1.5 years of diarrhea following laparoscopic cholecystectomy worse over the last 6 weeks.  He has been having to urgent liquid stools a day.  Reports lower abdominal cramps, excessive gas and bloating.  He has been taking Imodium which will alleviate his symptoms however diarrhea returns within 24 hours.  Denies sick contacts or recent antibiotic use.  His primary care team has been trying to improve his diabetes due to elevated A1c currently on 3 agents all started within the timeframe of worsening baseline diarrhea.  He is getting ready to see an endocrinologist.  His primary care provider tried him on Questran which caused worsening of nausea therefore he stopped it.    He reports increased dyspeptic symptoms, belching, nausea without vomiting over the last 2 months.  No dysphagia or odynophagia.  He is currently on Nexium which he purchases over-the-counter.  He has been on Nexium for about 1 year.  His appetite is good.  His weight is stable.  BMI 38.09.    He reports normal colonoscopy 6 years ago.  No personal history of colon polyps or family history of colon cancer.    Recent celiac panel negative.    Past Medical History:   Diagnosis Date    Arthritis     Asthma     Calculus of gallbladder without cholecystitis without obstruction 08/30/2022    Diabetes mellitus     GERD (gastroesophageal reflux disease)     Hx of cholecystectomy     Hypertension     Inguinal hernia of left side without obstruction or gangrene     Medicare annual wellness visit, subsequent     Sleep apnea        Past Surgical History:   Procedure Laterality Date    CATARACT EXTRACTION WITH INTRAOCULAR LENS IMPLANT Bilateral 01/2017     CHOLECYSTECTOMY N/A 08/30/2022    Procedure: CHOLECYSTECTOMY LAPAROSCOPIC WITH CHOLIANGIOGRAM;  Surgeon: Darin Juan MD;  Location: [facility] OR;  Service: General;  Laterality: N/A;    COLONOSCOPY  10/07/2015    ENDOSCOPY N/A 2017    INGUINAL HERNIA REPAIR Left     REPLACEMENT TOTAL HIP LATERAL POSITION      right        Scheduled Meds:     Continuous Infusions: No current facility-administered medications for this visit.      PRN Meds:     Allergies   Allergen Reactions    Benzamide Derivatives     Formaldehyde Itching     Pt is only able to wear cotton clothing    Benzonatate Rash    Corticosteroids Rash     An allergy to hydrocortisone cream, has had no reaction other than from an allergy test performed 20 years ago    Cortisone Rash    Trigonella Foenum-Graecum Rash       Social History     Socioeconomic History    Marital status:    Tobacco Use    Smoking status: Never    Smokeless tobacco: Never   Vaping Use    Vaping Use: Never used   Substance and Sexual Activity    Alcohol use: No    Drug use: No       Family History   Problem Relation Age of Onset    No Known Problems Mother     Heart disease Father     No Known Problems Sister        Review of Systems   Gastrointestinal:  Positive for diarrhea and nausea.       Vitals:    02/29/24 1419   BP: 142/83   Pulse: 58   Temp: 96.9 °F (36.1 °C)   SpO2: 95%       Physical Exam  Constitutional:       Appearance: He is well-developed.   Abdominal:      General: Bowel sounds are normal. There is no distension.      Palpations: Abdomen is soft. There is no mass.      Tenderness: There is no abdominal tenderness. There is no guarding.      Hernia: No hernia is present.   Skin:     General: Skin is warm and dry.      Capillary Refill: Capillary refill takes less than 2 seconds.   Neurological:      Mental Status: He is alert and oriented to person, place, and time.   Psychiatric:         Behavior: Behavior normal.     Assessment    Diagnoses and all  orders for this visit:    1. Diarrhea, unspecified type (Primary)  -     Calprotectin, Fecal - Stool, Per Rectum  -     Clostridioides difficile EIA - Stool, Per Rectum  -     Stool Culture (Reference Lab) - Stool, Per Rectum  -     CBC (No Diff)  -     Comprehensive Metabolic Panel  -     TSH  -     C-reactive Protein  -     Sedimentation Rate    2. Gastroesophageal reflux disease without esophagitis    Other orders  -     pantoprazole (PROTONIX) 40 MG EC tablet; Take 1 tablet by mouth Daily.  Dispense: 90 tablet; Refill: 3       Plan    Recommend stool testing today as above to rule out infectious pathogens and assess for colonic inflammation  We discussed the fact that symptoms could certainly be secondary to diabetic medication regimen to improve A1c however patient may need updated colonoscopy pending stool testing and the above ordered serology  Continue Imodium for now  Regarding increased dyspeptic symptoms recommend stopping Nexium and beginning Protonix 40 mg once daily in combination with antireflux dietary precautions  Encouraged him to see a nutritionist which is scheduled for 2 weeks from now  Further recommendations and follow-up pending workup         ANGIE Angulo  [facility]  [address] [place] [address]  Office: [phone]

## 2024-03-01 ENCOUNTER — TELEPHONE (OUTPATIENT)
Dept: GASTROENTEROLOGY | Facility: CLINIC | Age: 69
End: 2024-03-01
Payer: MEDICARE

## 2024-03-01 LAB
ALBUMIN SERPL-MCNC: 4.2 G/DL (ref 3.5–5.2)
ALBUMIN/GLOB SERPL: 2.1 G/DL
ALP SERPL-CCNC: 54 U/L (ref 39–117)
ALT SERPL-CCNC: 38 U/L (ref 1–41)
AST SERPL-CCNC: 27 U/L (ref 1–40)
BILIRUB SERPL-MCNC: 0.2 MG/DL (ref 0–1.2)
BUN SERPL-MCNC: 15 MG/DL (ref 8–23)
BUN/CREAT SERPL: 15.6 (ref 7–25)
C DIFF TOX A+B STL QL IA: NEGATIVE
CALCIUM SERPL-MCNC: 8.9 MG/DL (ref 8.6–10.5)
CHLORIDE SERPL-SCNC: 102 MMOL/L (ref 98–107)
CO2 SERPL-SCNC: 22.4 MMOL/L (ref 22–29)
CREAT SERPL-MCNC: 0.96 MG/DL (ref 0.76–1.27)
CRP SERPL-MCNC: <0.3 MG/DL (ref 0–0.5)
EGFRCR SERPLBLD CKD-EPI 2021: 87.2 ML/MIN/1.73
ERYTHROCYTE [DISTWIDTH] IN BLOOD BY AUTOMATED COUNT: 13.2 % (ref 12.3–15.4)
ERYTHROCYTE [SEDIMENTATION RATE] IN BLOOD BY WESTERGREN METHOD: 9 MM/HR (ref 0–20)
GLOBULIN SER CALC-MCNC: 2 GM/DL
GLUCOSE SERPL-MCNC: 119 MG/DL (ref 65–99)
HCT VFR BLD AUTO: 40.2 % (ref 37.5–51)
HGB BLD-MCNC: 13.6 G/DL (ref 13–17.7)
MCH RBC QN AUTO: 32.1 PG (ref 26.6–33)
MCHC RBC AUTO-ENTMCNC: 33.8 G/DL (ref 31.5–35.7)
MCV RBC AUTO: 94.8 FL (ref 79–97)
PLATELET # BLD AUTO: 249 10*3/MM3 (ref 140–450)
POTASSIUM SERPL-SCNC: 4.2 MMOL/L (ref 3.5–5.2)
PROT SERPL-MCNC: 6.2 G/DL (ref 6–8.5)
RBC # BLD AUTO: 4.24 10*6/MM3 (ref 4.14–5.8)
SODIUM SERPL-SCNC: 136 MMOL/L (ref 136–145)
TSH SERPL DL<=0.005 MIU/L-ACNC: 1.26 UIU/ML (ref 0.27–4.2)
WBC # BLD AUTO: 6.78 10*3/MM3 (ref 3.4–10.8)

## 2024-03-01 NOTE — TELEPHONE ENCOUNTER
----- Message from ANGIE Angulo sent at 3/1/2024  8:23 AM EST -----  Please inform the patient lab work is normal please complete stool testing.

## 2024-03-04 LAB
BACTERIA SPEC CULT: NORMAL
BACTERIA SPEC CULT: NORMAL
CAMPYLOBACTER STL CULT: NORMAL
E COLI SXT STL QL IA: NEGATIVE
SALM + SHIG STL CULT: NORMAL

## 2024-03-05 LAB — CALPROTECTIN STL-MCNT: 316 UG/G (ref 0–120)

## 2024-03-06 ENCOUNTER — HOSPITAL ENCOUNTER (OUTPATIENT)
Dept: DIABETES SERVICES | Facility: HOSPITAL | Age: 69
Discharge: HOME | End: 2024-03-06
Admitting: STUDENT IN AN ORGANIZED HEALTH CARE EDUCATION/TRAINING PROGRAM
Payer: MEDICARE

## 2024-03-06 PROCEDURE — 97802 MEDICAL NUTRITION INDIV IN: CPT

## 2024-03-07 NOTE — PROGRESS NOTES
Diabetes Education    Patient Name:  Bob Escamilla  YOB: 1955  MRN: [id number]  Admit Date:  3/6/2024    Mr. Escamilla met with MICHAEL SERVIN for [facility] for diagnosis of Type 2 diabetes.  A comprehensive assessment/training record has been sent to medical records (see media tab) to associate with this encounter. Patient's wife present at today's visit.     Reports that he was diagnosed with Type 2 diabetes about 3.5 years ago. States that he checks his blood glucose every morning - states his average is about 140 mg/dl. Reports that he had been taking Trulicity, but it made him very nauseated so he stopped taking it about a week ago. States he has an appointment with an endocrinologist in a week.     Hemoglobin A1c= 7.3%      We discussed meal planning - he was advised to follow “MyPlate” method to plan meals. We discussed the importance of eating regular meals. We reviewed how to read food labels.  We reviewed portion sizes - we discussed importance of “balance meals/snacks”.  We discussed the importance of following a consistent carb diet and we discussed carb counting. He was advised on the importance of physical activity. Patient verbalized understanding of all information reviewed at today’s visit.       Mr. Escamilla has been encouraged to call our office with questions or additional education needs. Please place referral for additional services or follow-up should need arise.    Thank you for the referral      Aliza Nava RD, RUBÉN, GARETH    Electronically signed by:  Aliza Nava RD  03/07/24 07:26 EST

## 2024-03-29 NOTE — PROGRESS NOTES
"Chief Complaint  Diabetes (B/S-147)    Subjective        Bob Escamilla presents to [facility]  History of Present Illness  68-year-old white male with a history of hypertension, DM2 & hyperlipidemia here for follow-up visit and discussion of multiple complaints.      Pt h/o right hip replacement. Pt c/o b/l hip pain getting worse over last 3-4 mo. patient also complaining of history of chronic low back pain and sciatica  Pt c/o high anxiety now with thoughts of his half-way and plans after half-way. Denied depressive sx.  Patient refused psychology consult or for any psych meds.  Provided patient list of psychiatry and psychology providers in [place] that are accepting patients, patient reports he will call for psych referral on his own when he decides.    Patient denies any suicidal or homicidal ideation.  Patient reports she has received the pneumonia vaccine back in October 2023.      Instructed patient to get the adult preventive immunization that are due at  the pharmacy, including - Tdap, prevnar.        Objective   Vital Signs:  /74 (BP Location: Left arm, Patient Position: Sitting, Cuff Size: Adult)   Pulse 64   Temp 98.7 °F (37.1 °C) (Temporal)   Ht 167.6 cm (65.98\")   Wt 103 kg (228 lb)   SpO2 96%   BMI 36.82 kg/m²   Estimated body mass index is 36.82 kg/m² as calculated from the following:    Height as of this encounter: 167.6 cm (65.98\").    Weight as of this encounter: 103 kg (228 lb).               Physical Exam  Constitutional:       Appearance: Normal appearance. He is obese.   HENT:      Head: Normocephalic and atraumatic.   Eyes:      Conjunctiva/sclera: Conjunctivae normal.   Cardiovascular:      Rate and Rhythm: Normal rate and regular rhythm.      Heart sounds: Normal heart sounds.   Pulmonary:      Effort: Pulmonary effort is normal.      Breath sounds: Normal breath sounds.   Abdominal:      General: Bowel sounds are normal.      Palpations: " Abdomen is soft.      Comments: Non-tender   Musculoskeletal:      Comments: Lumbar spine tender to palpation.  Sciatica symptoms on the right calf upon right straight leg raise test.  Bilateral hip pain with hip range of motion on ipsilateral side.   Skin:     General: Skin is warm.   Neurological:      General: No focal deficit present.      Mental Status: He is alert and oriented to person, place, and time.   Psychiatric:         Mood and Affect: Mood normal.         Behavior: Behavior normal.        Result Review :    The following data was reviewed by: ANGIE Tang on 04/02/2024:  CMP          9/19/2023    08:58 1/26/2024    15:27 2/29/2024    14:55   CMP   Glucose 136  112  119    BUN 15  17  15    Creatinine 0.71  0.87  0.96    Sodium 139  139  136    Potassium 4.3  4.5  4.2    Chloride 102  101  102    Calcium 9.9  9.3  8.9    Total Protein 7.1   6.2    Albumin 4.7   4.2    Globulin 2.4   2.0    Total Bilirubin 0.5   0.2    Alkaline Phosphatase 67   54    AST (SGOT) 34   27    ALT (SGPT) 55   38    BUN/Creatinine Ratio 21.1  20  15.6      CBC          5/3/2023    08:24 1/26/2024    15:27 2/29/2024    14:55   CBC   WBC 6.79  6.8  6.78    RBC 4.49  4.24  4.24    Hemoglobin 14.5  13.5  13.6    Hematocrit 41.0  39.4  40.2    MCV 91.3  93  94.8    MCH 32.3  31.8  32.1    MCHC 35.4  34.3  33.8    RDW 12.9  12.8  13.2    Platelets 256  252  249      Lipid Panel          5/3/2023    08:24   Lipid Panel   Total Cholesterol 148    Triglycerides 150    HDL Cholesterol 38    VLDL Cholesterol 26    LDL Cholesterol  84      TSH          2/29/2024    14:55   TSH   TSH 1.260      A1C Last 3 Results          5/3/2023    08:24 9/19/2023    08:58 1/9/2024    08:34   HGBA1C Last 3 Results   Hemoglobin A1C 7.10  7.10  7.30      Microalbumin          9/19/2023    08:58   Microalbumin   Microalbumin, Urine 29.2      PSA          9/19/2023    08:58   PSA   PSA 0.248      Data reviewed : Consultant notes reviewed  last GI consult note with gastroenterology Nurse Practitioner Alyx patient status post extensive GI workup.  Also reviewed last endocrinology note from [facility].             Assessment and Plan     Diagnoses and all orders for this visit:    1. Type 2 diabetes mellitus without complication, with long-term current use of insulin (Primary)  Assessment & Plan:  Diabetes is stable.   Continue current treatment regimen.  Reminded to bring in blood sugar diary at next visit.  Recommended an ADA diet.  Recommended a Mediterranean style of eating  Regular aerobic exercise.  Diabetes will be reassessed in 6 months    Patient follows [facility] endocrinologist Dr. Harrison for diabetes management currently.  Patient states GLP-1 agonist was stopped due to GI side effects.    Discussed diabetes management and need for adequate BS control. Educated patient high A1c puts him/her at risk for MI, CVA, CKD, gastroparesis, foot ulcers, and frequent infections.   Pt informed of Rx for glucometer/test strips, and lancets as needed and explained to keep a blood sugar log. Return to office as instructed. Additionally diet compliance explained - avoid sugar candy, soda, high carbohydrate loads. Explained how losing weight can improve your health and achieve better blood sugar control. Being active can also help prevent or control the disorder. Recommended annual opthalmology follow -up due to diagnosis of diabetes. Annual Podiatry visit prn.      Orders:  -     MicroAlbumin, Urine, Random - Urine, Clean Catch    2. Chronic pain of both hips  -     Ambulatory Referral to Orthopedic Surgery  -     XR Hip With or Without Pelvis 2 - 3 View Left; Future  -     XR pelvis 1 or 2 vw; Future    3. Chronic midline low back pain with sciatica, sciatica laterality unspecified  Assessment & Plan:  Provided patient x-ray requisition sheets and provided instructions to go to [facility] for x-ray.    Orders:  -     XR Spine Lumbar 2 or 3 View; Future  -      XR pelvis 1 or 2 vw; Future    4. Primary hypertension  Assessment & Plan:  Hypertension is stable and controlled  Continue current treatment regimen.  Dietary sodium restriction.  Weight loss.  Regular aerobic exercise.  Ambulatory blood pressure monitoring.  Blood pressure will be reassessed in 6 months.    Patient desires to space out his appointments will return to clinic in 6 months manage he is following endocrinologist for diabetes already.    Discussed the importance of healthy diet, nutrition, and lifestyle. Recommend low salt, fat/cholesterol diet and avoid concentrated sweets. Encouraged DASH diet along with fresh fruits & vegetables and low fat dairy products. Counseled patient to exercise/walk as tolerated. Avoid tobacco and alcohol use.      Orders:  -     losartan (COZAAR) 100 MG tablet; Take 1 tablet by mouth Daily.  Dispense: 90 tablet; Refill: 1    5. Gastroesophageal reflux disease without esophagitis  Assessment & Plan:  GERD precautions: Avoid fatty, greasy, spicy food, if current tobacco use stop smoking, stay upright for one hour before lying down. Also avoid Tobacco, caffeine, soda, mint, and garlic.        6. Mixed hyperlipidemia  Assessment & Plan:   Lipid abnormalities are  we will recheck fasting lipid panel today    Plan:  Continue same medication/s without change.      Discussed medication dosage, use, side effects, and goals of treatment in detail.    Counseled patient on lifestyle modifications to help control hyperlipidemia.     Patient Treatment Goals:   LDL goal is under 100    Followup in 6 months.  Discussed the importance of healthy diet, nutrition, and lifestyle. Recommend low salt, fat/cholesterol diet and avoid concentrated sweets. Encouraged DASH diet along with fresh fruits & vegetables and low fat dairy products. Counseled patient to exercise/walk as tolerated. Avoid tobacco and alcohol use.      Orders:  -     Lipid Panel    7. RLS (restless legs syndrome)  Assessment  & Plan:  Stable on ropinirole    Orders:  -     rOPINIRole (REQUIP) 1 MG tablet; Take 1 tablet by mouth Every Night.  Dispense: 90 tablet; Refill: 1    8. Environmental and seasonal allergies  Assessment & Plan:  Stable    Orders:  -     montelukast (Singulair) 10 MG tablet; Take 1 tablet by mouth Every Night.  Dispense: 90 tablet; Refill: 1  -     fexofenadine (ALLEGRA) 180 MG tablet; Take 1 tablet by mouth Daily.  Dispense: 90 tablet; Refill: 1    9. Elevated liver enzymes  Assessment & Plan:  Hepatitis panel negative patient status post GI and had an extensive workup for GI complaints.      10. Chronic diarrhea  Assessment & Plan:  Patient status post workup with gastroenterology and negative for infection possible inflammation that is now resolving.      11. Anemia, unspecified type  Assessment & Plan:  Resolved      12. Anxiety disorder, unspecified type  Assessment & Plan:  Psychological condition is newly identified.  Medication changes per orders.  Psychological condition  will be reassessed in 6 months.  Discussed hydroxyzine as a possible medication to control anxiety as needed and instructed patient to take it only at bedtime as it has Benadryl like effect, patient was understanding.  Patient denied any suicidal or homicidal ideation.  Patient refused any psych med prescriptions today as he is a .  Patient refused psychology referral.    Educated patient on the side effects of hydroxyzine and instructed patient to avoid driving or operating heavy machinery after taking the medication, patient voiced understanding.    Instructed patient to Return to Office as needed for persistent or new symptoms and/or go to ER for worsening symptoms, patient voiced understanding.       Orders:  -     hydrOXYzine (ATARAX) 25 MG tablet; Take 2 tablets by mouth At Night As Needed for Itching, Allergies or Anxiety for up to 60 days.  Dispense: 60 tablet; Refill: 1    13. History of right hip  replacement      All chronic conditions have been addressed and treated by the practice or other specialists. Medications have been reconciled and refilled as appropriate. Reiterated compliance and timely follow up appointments. Side effects of all new and old medications reviewed with the patient and patient willing to accept all risks involved. Advised RTO if no improvement or worsening of symptoms or if any new complaints arise. Patient advised to follow up with clinic or call after diagnostic tests, if patient does not hear from office 3 days after the test completion.            Follow Up     Return in about 6 months (around 10/2/2024) for Next scheduled follow up, Medicare Wellness.  Patient was given instructions and counseling regarding his condition or for health maintenance advice. Please see specific information pulled into the AVS if appropriate.

## 2024-04-02 ENCOUNTER — OFFICE VISIT (OUTPATIENT)
Dept: FAMILY MEDICINE CLINIC | Facility: CLINIC | Age: 69
End: 2024-04-02
Payer: MEDICARE

## 2024-04-02 VITALS
HEIGHT: 66 IN | OXYGEN SATURATION: 96 % | TEMPERATURE: 98.7 F | BODY MASS INDEX: 36.64 KG/M2 | HEART RATE: 64 BPM | DIASTOLIC BLOOD PRESSURE: 74 MMHG | SYSTOLIC BLOOD PRESSURE: 132 MMHG | WEIGHT: 228 LBS

## 2024-04-02 DIAGNOSIS — M54.40 CHRONIC MIDLINE LOW BACK PAIN WITH SCIATICA, SCIATICA LATERALITY UNSPECIFIED: ICD-10-CM

## 2024-04-02 DIAGNOSIS — E78.1 HYPERTRIGLYCERIDEMIA: ICD-10-CM

## 2024-04-02 DIAGNOSIS — G89.29 CHRONIC PAIN OF BOTH HIPS: ICD-10-CM

## 2024-04-02 DIAGNOSIS — F41.9 ANXIETY DISORDER, UNSPECIFIED TYPE: ICD-10-CM

## 2024-04-02 DIAGNOSIS — K21.9 GASTROESOPHAGEAL REFLUX DISEASE WITHOUT ESOPHAGITIS: Chronic | ICD-10-CM

## 2024-04-02 DIAGNOSIS — G25.81 RLS (RESTLESS LEGS SYNDROME): Chronic | ICD-10-CM

## 2024-04-02 DIAGNOSIS — M25.552 CHRONIC PAIN OF BOTH HIPS: ICD-10-CM

## 2024-04-02 DIAGNOSIS — I10 PRIMARY HYPERTENSION: Chronic | ICD-10-CM

## 2024-04-02 DIAGNOSIS — Z96.641 HISTORY OF RIGHT HIP REPLACEMENT: ICD-10-CM

## 2024-04-02 DIAGNOSIS — R74.8 ELEVATED LIVER ENZYMES: ICD-10-CM

## 2024-04-02 DIAGNOSIS — M25.551 CHRONIC PAIN OF BOTH HIPS: ICD-10-CM

## 2024-04-02 DIAGNOSIS — Z79.4 TYPE 2 DIABETES MELLITUS WITHOUT COMPLICATION, WITH LONG-TERM CURRENT USE OF INSULIN: Primary | Chronic | ICD-10-CM

## 2024-04-02 DIAGNOSIS — G89.29 CHRONIC MIDLINE LOW BACK PAIN WITH SCIATICA, SCIATICA LATERALITY UNSPECIFIED: ICD-10-CM

## 2024-04-02 DIAGNOSIS — J30.89 ENVIRONMENTAL AND SEASONAL ALLERGIES: Chronic | ICD-10-CM

## 2024-04-02 DIAGNOSIS — E11.9 TYPE 2 DIABETES MELLITUS WITHOUT COMPLICATION, WITH LONG-TERM CURRENT USE OF INSULIN: Primary | Chronic | ICD-10-CM

## 2024-04-02 DIAGNOSIS — K52.9 CHRONIC DIARRHEA: ICD-10-CM

## 2024-04-02 DIAGNOSIS — E78.2 MIXED HYPERLIPIDEMIA: Chronic | ICD-10-CM

## 2024-04-02 DIAGNOSIS — D64.9 ANEMIA, UNSPECIFIED TYPE: ICD-10-CM

## 2024-04-02 PROBLEM — Z12.5 PROSTATE CANCER SCREENING: Status: RESOLVED | Noted: 2023-09-19 | Resolved: 2024-04-02

## 2024-04-02 RX ORDER — MONTELUKAST SODIUM 10 MG/1
10 TABLET ORAL NIGHTLY
Qty: 90 TABLET | Refills: 1 | Status: SHIPPED | OUTPATIENT
Start: 2024-04-02

## 2024-04-02 RX ORDER — LOSARTAN POTASSIUM 100 MG/1
100 TABLET ORAL DAILY
Qty: 90 TABLET | Refills: 1 | Status: SHIPPED | OUTPATIENT
Start: 2024-04-02

## 2024-04-02 RX ORDER — HYDROXYZINE HYDROCHLORIDE 25 MG/1
50 TABLET, FILM COATED ORAL NIGHTLY PRN
Qty: 60 TABLET | Refills: 1 | Status: SHIPPED | OUTPATIENT
Start: 2024-04-02 | End: 2024-06-01

## 2024-04-02 RX ORDER — ROPINIROLE 1 MG/1
1 TABLET, FILM COATED ORAL NIGHTLY
Qty: 90 TABLET | Refills: 1 | Status: SHIPPED | OUTPATIENT
Start: 2024-04-02

## 2024-04-02 RX ORDER — FEXOFENADINE HCL 180 MG/1
180 TABLET ORAL DAILY
Qty: 90 TABLET | Refills: 1 | Status: SHIPPED | OUTPATIENT
Start: 2024-04-02

## 2024-04-02 NOTE — ASSESSMENT & PLAN NOTE
Diabetes is stable.   Continue current treatment regimen.  Reminded to bring in blood sugar diary at next visit.  Recommended an ADA diet.  Recommended a Mediterranean style of eating  Regular aerobic exercise.  Diabetes will be reassessed in 6 months    Patient follows [facility] endocrinologist Dr. Harrison for diabetes management currently.  Patient states GLP-1 agonist was stopped due to GI side effects.    Discussed diabetes management and need for adequate BS control. Educated patient high A1c puts him/her at risk for MI, CVA, CKD, gastroparesis, foot ulcers, and frequent infections.   Pt informed of Rx for glucometer/test strips, and lancets as needed and explained to keep a blood sugar log. Return to office as instructed. Additionally diet compliance explained - avoid sugar candy, soda, high carbohydrate loads. Explained how losing weight can improve your health and achieve better blood sugar control. Being active can also help prevent or control the disorder. Recommended annual opthalmology follow -up due to diagnosis of diabetes. Annual Podiatry visit prn.

## 2024-04-02 NOTE — ASSESSMENT & PLAN NOTE
Psychological condition is newly identified.  Medication changes per orders.  Psychological condition  will be reassessed in 6 months.  Discussed hydroxyzine as a possible medication to control anxiety as needed and instructed patient to take it only at bedtime as it has Benadryl like effect, patient was understanding.  Patient denied any suicidal or homicidal ideation.  Patient refused any psych med prescriptions today as he is a .  Patient refused psychology referral.    Educated patient on the side effects of hydroxyzine and instructed patient to avoid driving or operating heavy machinery after taking the medication, patient voiced understanding.    Instructed patient to Return to Office as needed for persistent or new symptoms and/or go to ER for worsening symptoms, patient voiced understanding.

## 2024-04-02 NOTE — ASSESSMENT & PLAN NOTE
Patient status post workup with gastroenterology and negative for infection possible inflammation that is now resolving.

## 2024-04-02 NOTE — ASSESSMENT & PLAN NOTE
Provided patient x-ray requisition sheets and provided instructions to go to [facility] for x-ray.

## 2024-04-02 NOTE — ASSESSMENT & PLAN NOTE
Lipid abnormalities are  we will recheck fasting lipid panel today    Plan:  Continue same medication/s without change.      Discussed medication dosage, use, side effects, and goals of treatment in detail.    Counseled patient on lifestyle modifications to help control hyperlipidemia.     Patient Treatment Goals:   LDL goal is under 100    Followup in 6 months.  Discussed the importance of healthy diet, nutrition, and lifestyle. Recommend low salt, fat/cholesterol diet and avoid concentrated sweets. Encouraged DASH diet along with fresh fruits & vegetables and low fat dairy products. Counseled patient to exercise/walk as tolerated. Avoid tobacco and alcohol use.

## 2024-04-02 NOTE — ASSESSMENT & PLAN NOTE
Hypertension is stable and controlled  Continue current treatment regimen.  Dietary sodium restriction.  Weight loss.  Regular aerobic exercise.  Ambulatory blood pressure monitoring.  Blood pressure will be reassessed in 6 months.    Patient desires to space out his appointments will return to clinic in 6 months manage he is following endocrinologist for diabetes already.    Discussed the importance of healthy diet, nutrition, and lifestyle. Recommend low salt, fat/cholesterol diet and avoid concentrated sweets. Encouraged DASH diet along with fresh fruits & vegetables and low fat dairy products. Counseled patient to exercise/walk as tolerated. Avoid tobacco and alcohol use.

## 2024-04-03 PROBLEM — E78.1 HYPERTRIGLYCERIDEMIA: Status: ACTIVE | Noted: 2024-04-03

## 2024-04-03 LAB
CHOLEST SERPL-MCNC: 157 MG/DL (ref 0–200)
HDLC SERPL-MCNC: 34 MG/DL (ref 40–60)
LDLC SERPL CALC-MCNC: 84 MG/DL (ref 0–100)
MICROALBUMIN UR-MCNC: 5.1 UG/ML
TRIGL SERPL-MCNC: 232 MG/DL (ref 0–150)
VLDLC SERPL CALC-MCNC: 39 MG/DL (ref 5–40)

## 2024-04-03 RX ORDER — OMEGA-3/DHA/EPA/FISH OIL 300-1000MG
1 CAPSULE ORAL DAILY
Qty: 180 CAPSULE | Refills: 3 | Status: SHIPPED | OUTPATIENT
Start: 2024-04-03

## 2024-04-10 ENCOUNTER — OFFICE VISIT (OUTPATIENT)
Dept: SPORTS MEDICINE | Facility: CLINIC | Age: 69
End: 2024-04-10
Payer: MEDICARE

## 2024-04-10 VITALS
TEMPERATURE: 97.5 F | DIASTOLIC BLOOD PRESSURE: 68 MMHG | HEART RATE: 65 BPM | OXYGEN SATURATION: 95 % | SYSTOLIC BLOOD PRESSURE: 134 MMHG | HEIGHT: 66 IN | BODY MASS INDEX: 36.64 KG/M2 | WEIGHT: 228 LBS | RESPIRATION RATE: 12 BRPM

## 2024-04-10 DIAGNOSIS — M54.42 CHRONIC BILATERAL LOW BACK PAIN WITH BILATERAL SCIATICA: Primary | Chronic | ICD-10-CM

## 2024-04-10 DIAGNOSIS — M54.41 CHRONIC BILATERAL LOW BACK PAIN WITH BILATERAL SCIATICA: Primary | Chronic | ICD-10-CM

## 2024-04-10 DIAGNOSIS — G89.29 CHRONIC BILATERAL LOW BACK PAIN WITH BILATERAL SCIATICA: Primary | Chronic | ICD-10-CM

## 2024-04-10 DIAGNOSIS — M47.817 FACET ARTHROPATHY, LUMBOSACRAL: Chronic | ICD-10-CM

## 2024-04-10 RX ORDER — OMEGA-3-ACID ETHYL ESTERS 1 G/1
CAPSULE, LIQUID FILLED ORAL
COMMUNITY
Start: 2024-04-03

## 2024-04-10 RX ORDER — CELECOXIB 200 MG/1
CAPSULE ORAL
Qty: 30 CAPSULE | Refills: 0 | Status: SHIPPED | OUTPATIENT
Start: 2024-04-10

## 2024-04-10 RX ORDER — LANOLIN ALCOHOL/MO/W.PET/CERES
325 CREAM (GRAM) TOPICAL DAILY
COMMUNITY

## 2024-04-10 RX ORDER — CETIRIZINE HYDROCHLORIDE 10 MG/1
10 TABLET ORAL DAILY
COMMUNITY

## 2024-04-10 RX ORDER — DIPHENHYDRAMINE HCL 25 MG
TABLET ORAL
COMMUNITY

## 2024-04-10 NOTE — PROGRESS NOTES
"Chief Complaint  Pain and Initial Evaluation of the Left Hip (Ongoing for about 3-4 months-Is a  so is sitting all the time) and Pain and Initial Evaluation of the Right Hip (Surgery about 9 years ago)    Subjective        Bob Escamilla presents to [facility]  History of Present Illness  Referred to us by his PCP ANGIE Quesada for bilateral  \"hip pain\" .  Patient points to the area of pain and is located in the lumbosacral area and upper gluteus area bilaterally.  Patient states that he has occasionally had sciatica symptoms on the right but has now had some intermittent symptoms on the left as well.  No known trauma.  Has had a \"hip surgery\" on the right in the past.  Patient states his pain is worse with prolonged sitting to standing.  Also worse first thing in the morning but loosens up as he walks around.  No claudication symptoms.  No alarm symptoms.  Objective   Vital Signs:  /68 (BP Location: Left arm, Patient Position: Sitting, Cuff Size: Adult)   Pulse 65   Temp 97.5 °F (36.4 °C) (Infrared)   Resp 12   Ht 167.6 cm (66\")   Wt 103 kg (228 lb)   SpO2 95%   BMI 36.80 kg/m²   Estimated body mass index is 36.8 kg/m² as calculated from the following:    Height as of this encounter: 167.6 cm (66\").    Weight as of this encounter: 103 kg (228 lb).               Physical Exam  Vitals reviewed.   Constitutional:       Appearance: He is well-developed.   HENT:      Head: Normocephalic and atraumatic.   Eyes:      Conjunctiva/sclera: Conjunctivae normal.      Pupils: Pupils are equal, round, and reactive to light.   Cardiovascular:      Comments: No peripheral edema  Pulmonary:      Effort: Pulmonary effort is normal.   Musculoskeletal:      Comments: Lumbar spine normal in general appearance.  Patient has pain bilateral lumbosacral area with back extension, no pain with forward flexion.  Also has ipsilateral pain with lateral bending.  Patient has " negative straight leg raise bilaterally, negative slump test bilaterally.    Bilateral hips normal in general appearance.  Patient has a bilateral negative logroll.  Minimal tenderness over the greater trochanter on the right, none on the left.  Patient has full painless range of motion of the hips.  Negative LUDY ER.   Skin:     General: Skin is warm and dry.   Neurological:      Mental Status: He is alert and oriented to person, place, and time.   Psychiatric:         Behavior: Behavior normal.        Result Review :          Lumbar Spine X-Ray  Indication: Pain  Views: AP and Lateral    Findings:  No fracture  Facet hypertrophy L4-S1  Normal soft tissues  Normal disc spaces  Mild anterior listhesis L5-S1  No prior studies were available for comparison.  C-reactive Protein (02/29/2024 14:55)   Comprehensive Metabolic Panel (02/29/2024 14:55)     CBC (No Diff) (02/29/2024 14:55)   Sedimentation Rate (02/29/2024 14:55)      Assessment and Plan     Diagnoses and all orders for this visit:    1. Chronic bilateral low back pain with bilateral sciatica (Primary)  -     XR Spine Lumbar 2 or 3 View  -     celecoxib (CeleBREX) 200 MG capsule; 1 qd with food  Dispense: 30 capsule; Refill: 0    2. Facet arthropathy, lumbosacral  -     celecoxib (CeleBREX) 200 MG capsule; 1 qd with food  Dispense: 30 capsule; Refill: 0    I discussed with the patient that his signs and symptoms consistent with facet arthropathy of the lumbosacral spine.  Will treat with Celebrex 200 mg 1 p.o. daily with food once a day for 2 weeks then after that if he is not having any pain he can use it as needed for another couple of weeks.  Also gave him a set of home exercises for the low back.  If he is not seeing any significant improvement in 2 weeks then he may call at that point would consider MRI lumbar spine.         Follow Up     No follow-ups on file.  Patient was given instructions and counseling regarding his condition or for health maintenance  advice. Please see specific information pulled into the AVS if appropriate.

## 2024-04-11 ENCOUNTER — PATIENT ROUNDING (BHMG ONLY) (OUTPATIENT)
Dept: SPORTS MEDICINE | Facility: CLINIC | Age: 69
End: 2024-04-11
Payer: MEDICARE

## 2024-04-11 NOTE — PROGRESS NOTES
April 11, 2024    A [employer] Message has been sent to the patient for PATIENT ROUNDING with [facility]

## 2024-05-07 DIAGNOSIS — M54.41 CHRONIC BILATERAL LOW BACK PAIN WITH BILATERAL SCIATICA: Chronic | ICD-10-CM

## 2024-05-07 DIAGNOSIS — G89.29 CHRONIC BILATERAL LOW BACK PAIN WITH BILATERAL SCIATICA: Chronic | ICD-10-CM

## 2024-05-07 DIAGNOSIS — M47.817 FACET ARTHROPATHY, LUMBOSACRAL: Chronic | ICD-10-CM

## 2024-05-07 DIAGNOSIS — M54.42 CHRONIC BILATERAL LOW BACK PAIN WITH BILATERAL SCIATICA: Chronic | ICD-10-CM

## 2024-05-07 RX ORDER — CELECOXIB 200 MG/1
CAPSULE ORAL
Qty: 30 CAPSULE | Refills: 0 | Status: SHIPPED | OUTPATIENT
Start: 2024-05-07

## 2024-06-05 DIAGNOSIS — M54.41 CHRONIC BILATERAL LOW BACK PAIN WITH BILATERAL SCIATICA: Chronic | ICD-10-CM

## 2024-06-05 DIAGNOSIS — G89.29 CHRONIC BILATERAL LOW BACK PAIN WITH BILATERAL SCIATICA: Chronic | ICD-10-CM

## 2024-06-05 DIAGNOSIS — M47.817 FACET ARTHROPATHY, LUMBOSACRAL: Chronic | ICD-10-CM

## 2024-06-05 DIAGNOSIS — M54.42 CHRONIC BILATERAL LOW BACK PAIN WITH BILATERAL SCIATICA: Chronic | ICD-10-CM

## 2024-06-07 RX ORDER — CELECOXIB 200 MG/1
CAPSULE ORAL
Qty: 30 CAPSULE | Refills: 0 | Status: SHIPPED | OUTPATIENT
Start: 2024-06-07

## 2024-07-08 DIAGNOSIS — M54.41 CHRONIC BILATERAL LOW BACK PAIN WITH BILATERAL SCIATICA: Chronic | ICD-10-CM

## 2024-07-08 DIAGNOSIS — M54.42 CHRONIC BILATERAL LOW BACK PAIN WITH BILATERAL SCIATICA: Chronic | ICD-10-CM

## 2024-07-08 DIAGNOSIS — G89.29 CHRONIC BILATERAL LOW BACK PAIN WITH BILATERAL SCIATICA: Chronic | ICD-10-CM

## 2024-07-08 DIAGNOSIS — M47.817 FACET ARTHROPATHY, LUMBOSACRAL: Chronic | ICD-10-CM

## 2024-07-09 RX ORDER — CELECOXIB 200 MG/1
CAPSULE ORAL
Qty: 30 CAPSULE | Refills: 0 | Status: SHIPPED | OUTPATIENT
Start: 2024-07-09

## 2024-08-08 DIAGNOSIS — M54.42 CHRONIC BILATERAL LOW BACK PAIN WITH BILATERAL SCIATICA: Chronic | ICD-10-CM

## 2024-08-08 DIAGNOSIS — M54.41 CHRONIC BILATERAL LOW BACK PAIN WITH BILATERAL SCIATICA: Chronic | ICD-10-CM

## 2024-08-08 DIAGNOSIS — G89.29 CHRONIC BILATERAL LOW BACK PAIN WITH BILATERAL SCIATICA: Chronic | ICD-10-CM

## 2024-08-08 DIAGNOSIS — M47.817 FACET ARTHROPATHY, LUMBOSACRAL: Chronic | ICD-10-CM

## 2024-08-22 DIAGNOSIS — E11.9 TYPE 2 DIABETES MELLITUS WITHOUT COMPLICATION, WITH LONG-TERM CURRENT USE OF INSULIN: ICD-10-CM

## 2024-08-22 DIAGNOSIS — Z79.4 TYPE 2 DIABETES MELLITUS WITHOUT COMPLICATION, WITH LONG-TERM CURRENT USE OF INSULIN: ICD-10-CM

## 2024-08-23 RX ORDER — PIOGLITAZONEHYDROCHLORIDE 30 MG/1
30 TABLET ORAL DAILY
Qty: 90 TABLET | Refills: 1 | Status: SHIPPED | OUTPATIENT
Start: 2024-08-23

## 2024-08-29 DIAGNOSIS — F41.9 ANXIETY DISORDER, UNSPECIFIED TYPE: ICD-10-CM

## 2024-08-29 DIAGNOSIS — J30.89 ENVIRONMENTAL AND SEASONAL ALLERGIES: Chronic | ICD-10-CM

## 2024-08-29 RX ORDER — CELECOXIB 200 MG/1
CAPSULE ORAL
Qty: 30 CAPSULE | Refills: 0 | Status: SHIPPED | OUTPATIENT
Start: 2024-08-29

## 2024-08-29 NOTE — TELEPHONE ENCOUNTER
Incoming call from pharmacy in regards to refill. Patient has not reviewed mychart message. Will okay one refill per Dr. De Leon but if he needs long term rx, he will need to get from PCP.     Thanks  Helene

## 2024-08-30 RX ORDER — FEXOFENADINE HCL 180 MG/1
180 TABLET ORAL DAILY
Qty: 90 TABLET | Refills: 1 | Status: SHIPPED | OUTPATIENT
Start: 2024-08-30

## 2024-08-30 RX ORDER — HYDROXYZINE HYDROCHLORIDE 25 MG/1
TABLET, FILM COATED ORAL
Qty: 60 TABLET | Refills: 1 | Status: SHIPPED | OUTPATIENT
Start: 2024-08-30

## 2024-09-05 DIAGNOSIS — I10 PRIMARY HYPERTENSION: Chronic | ICD-10-CM

## 2024-09-05 RX ORDER — AMLODIPINE BESYLATE 10 MG/1
10 TABLET ORAL DAILY
Qty: 90 TABLET | Refills: 1 | Status: SHIPPED | OUTPATIENT
Start: 2024-09-05

## 2024-09-25 DIAGNOSIS — M54.42 CHRONIC BILATERAL LOW BACK PAIN WITH BILATERAL SCIATICA: Chronic | ICD-10-CM

## 2024-09-25 DIAGNOSIS — M54.41 CHRONIC BILATERAL LOW BACK PAIN WITH BILATERAL SCIATICA: Chronic | ICD-10-CM

## 2024-09-25 DIAGNOSIS — M47.817 FACET ARTHROPATHY, LUMBOSACRAL: Chronic | ICD-10-CM

## 2024-09-25 DIAGNOSIS — G89.29 CHRONIC BILATERAL LOW BACK PAIN WITH BILATERAL SCIATICA: Chronic | ICD-10-CM

## 2024-09-25 RX ORDER — CELECOXIB 200 MG/1
CAPSULE ORAL
Qty: 90 CAPSULE | Refills: 0 | Status: SHIPPED | OUTPATIENT
Start: 2024-09-25

## 2024-10-02 ENCOUNTER — OFFICE VISIT (OUTPATIENT)
Dept: FAMILY MEDICINE CLINIC | Facility: CLINIC | Age: 69
End: 2024-10-02
Payer: MEDICARE

## 2024-10-02 VITALS
SYSTOLIC BLOOD PRESSURE: 144 MMHG | BODY MASS INDEX: 40.76 KG/M2 | HEIGHT: 66 IN | RESPIRATION RATE: 16 BRPM | TEMPERATURE: 98.2 F | OXYGEN SATURATION: 97 % | DIASTOLIC BLOOD PRESSURE: 70 MMHG | HEART RATE: 50 BPM | WEIGHT: 253.6 LBS

## 2024-10-02 DIAGNOSIS — Z79.4 TYPE 2 DIABETES MELLITUS WITHOUT COMPLICATION, WITH LONG-TERM CURRENT USE OF INSULIN: Chronic | ICD-10-CM

## 2024-10-02 DIAGNOSIS — E11.9 TYPE 2 DIABETES MELLITUS WITHOUT COMPLICATION, WITH LONG-TERM CURRENT USE OF INSULIN: Chronic | ICD-10-CM

## 2024-10-02 DIAGNOSIS — I10 PRIMARY HYPERTENSION: Chronic | ICD-10-CM

## 2024-10-02 DIAGNOSIS — E78.1 HYPERTRIGLYCERIDEMIA: ICD-10-CM

## 2024-10-02 DIAGNOSIS — E78.2 MIXED HYPERLIPIDEMIA: Chronic | ICD-10-CM

## 2024-10-02 DIAGNOSIS — J30.89 ENVIRONMENTAL AND SEASONAL ALLERGIES: Chronic | ICD-10-CM

## 2024-10-02 DIAGNOSIS — Z12.5 PROSTATE CANCER SCREENING: ICD-10-CM

## 2024-10-02 DIAGNOSIS — Z00.00 MEDICARE ANNUAL WELLNESS VISIT, SUBSEQUENT: Primary | ICD-10-CM

## 2024-10-02 DIAGNOSIS — K21.9 GASTROESOPHAGEAL REFLUX DISEASE WITHOUT ESOPHAGITIS: Chronic | ICD-10-CM

## 2024-10-02 DIAGNOSIS — M25.472 ANKLE EDEMA, BILATERAL: ICD-10-CM

## 2024-10-02 DIAGNOSIS — G25.81 RLS (RESTLESS LEGS SYNDROME): Chronic | ICD-10-CM

## 2024-10-02 DIAGNOSIS — M25.471 ANKLE EDEMA, BILATERAL: ICD-10-CM

## 2024-10-02 LAB
ALBUMIN SERPL-MCNC: 4.5 G/DL (ref 3.5–5.2)
ALBUMIN/GLOB SERPL: 1.9 G/DL
ALP SERPL-CCNC: 74 U/L (ref 39–117)
ALT SERPL-CCNC: 24 U/L (ref 1–41)
AST SERPL-CCNC: 20 U/L (ref 1–40)
BASOPHILS # BLD AUTO: 0.06 10*3/MM3 (ref 0–0.2)
BASOPHILS NFR BLD AUTO: 0.8 % (ref 0–1.5)
BILIRUB SERPL-MCNC: 0.7 MG/DL (ref 0–1.2)
BUN SERPL-MCNC: 13 MG/DL (ref 8–23)
BUN/CREAT SERPL: 15.7 (ref 7–25)
CALCIUM SERPL-MCNC: 9.4 MG/DL (ref 8.6–10.5)
CHLORIDE SERPL-SCNC: 104 MMOL/L (ref 98–107)
CHOLEST SERPL-MCNC: 160 MG/DL (ref 0–200)
CO2 SERPL-SCNC: 23 MMOL/L (ref 22–29)
CREAT SERPL-MCNC: 0.83 MG/DL (ref 0.76–1.27)
EGFRCR SERPLBLD CKD-EPI 2021: 95.9 ML/MIN/1.73
EOSINOPHIL # BLD AUTO: 0.2 10*3/MM3 (ref 0–0.4)
EOSINOPHIL NFR BLD AUTO: 2.7 % (ref 0.3–6.2)
ERYTHROCYTE [DISTWIDTH] IN BLOOD BY AUTOMATED COUNT: 12.9 % (ref 12.3–15.4)
GLOBULIN SER CALC-MCNC: 2.4 GM/DL
GLUCOSE SERPL-MCNC: 132 MG/DL (ref 65–99)
HBA1C MFR BLD: 6.8 % (ref 4.8–5.6)
HCT VFR BLD AUTO: 43.9 % (ref 37.5–51)
HDLC SERPL-MCNC: 42 MG/DL (ref 40–60)
HGB BLD-MCNC: 15.1 G/DL (ref 13–17.7)
IMM GRANULOCYTES # BLD AUTO: 0.02 10*3/MM3 (ref 0–0.05)
IMM GRANULOCYTES NFR BLD AUTO: 0.3 % (ref 0–0.5)
LDLC SERPL CALC-MCNC: 91 MG/DL (ref 0–100)
LYMPHOCYTES # BLD AUTO: 1.97 10*3/MM3 (ref 0.7–3.1)
LYMPHOCYTES NFR BLD AUTO: 26.2 % (ref 19.6–45.3)
MCH RBC QN AUTO: 33 PG (ref 26.6–33)
MCHC RBC AUTO-ENTMCNC: 34.4 G/DL (ref 31.5–35.7)
MCV RBC AUTO: 95.9 FL (ref 79–97)
MONOCYTES # BLD AUTO: 0.61 10*3/MM3 (ref 0.1–0.9)
MONOCYTES NFR BLD AUTO: 8.1 % (ref 5–12)
NEUTROPHILS # BLD AUTO: 4.67 10*3/MM3 (ref 1.7–7)
NEUTROPHILS NFR BLD AUTO: 61.9 % (ref 42.7–76)
NRBC BLD AUTO-RTO: 0 /100 WBC (ref 0–0.2)
PLATELET # BLD AUTO: 247 10*3/MM3 (ref 140–450)
POTASSIUM SERPL-SCNC: 4.5 MMOL/L (ref 3.5–5.2)
PROT SERPL-MCNC: 6.9 G/DL (ref 6–8.5)
PSA SERPL-MCNC: 0.28 NG/ML (ref 0–4)
RBC # BLD AUTO: 4.58 10*6/MM3 (ref 4.14–5.8)
SODIUM SERPL-SCNC: 138 MMOL/L (ref 136–145)
TRIGL SERPL-MCNC: 153 MG/DL (ref 0–150)
VLDLC SERPL CALC-MCNC: 27 MG/DL (ref 5–40)
WBC # BLD AUTO: 7.53 10*3/MM3 (ref 3.4–10.8)

## 2024-10-02 PROCEDURE — 3051F HG A1C>EQUAL 7.0%<8.0%: CPT | Performed by: STUDENT IN AN ORGANIZED HEALTH CARE EDUCATION/TRAINING PROGRAM

## 2024-10-02 PROCEDURE — 1160F RVW MEDS BY RX/DR IN RCRD: CPT | Performed by: STUDENT IN AN ORGANIZED HEALTH CARE EDUCATION/TRAINING PROGRAM

## 2024-10-02 PROCEDURE — 1170F FXNL STATUS ASSESSED: CPT | Performed by: STUDENT IN AN ORGANIZED HEALTH CARE EDUCATION/TRAINING PROGRAM

## 2024-10-02 PROCEDURE — 1126F AMNT PAIN NOTED NONE PRSNT: CPT | Performed by: STUDENT IN AN ORGANIZED HEALTH CARE EDUCATION/TRAINING PROGRAM

## 2024-10-02 PROCEDURE — 1159F MED LIST DOCD IN RCRD: CPT | Performed by: STUDENT IN AN ORGANIZED HEALTH CARE EDUCATION/TRAINING PROGRAM

## 2024-10-02 PROCEDURE — 3077F SYST BP >= 140 MM HG: CPT | Performed by: STUDENT IN AN ORGANIZED HEALTH CARE EDUCATION/TRAINING PROGRAM

## 2024-10-02 PROCEDURE — 99214 OFFICE O/P EST MOD 30 MIN: CPT | Performed by: STUDENT IN AN ORGANIZED HEALTH CARE EDUCATION/TRAINING PROGRAM

## 2024-10-02 PROCEDURE — G0439 PPPS, SUBSEQ VISIT: HCPCS | Performed by: STUDENT IN AN ORGANIZED HEALTH CARE EDUCATION/TRAINING PROGRAM

## 2024-10-02 PROCEDURE — 3078F DIAST BP <80 MM HG: CPT | Performed by: STUDENT IN AN ORGANIZED HEALTH CARE EDUCATION/TRAINING PROGRAM

## 2024-10-02 RX ORDER — ELECTROLYTES/DEXTROSE
1 SOLUTION, ORAL ORAL DAILY
Qty: 90 TABLET | Refills: 3 | Status: SHIPPED | OUTPATIENT
Start: 2024-10-02 | End: 2025-09-27

## 2024-10-02 RX ORDER — LOSARTAN POTASSIUM 100 MG/1
100 TABLET ORAL DAILY
Qty: 90 TABLET | Refills: 2 | Status: SHIPPED | OUTPATIENT
Start: 2024-10-02

## 2024-10-02 RX ORDER — FEXOFENADINE HCL 180 MG/1
180 TABLET ORAL DAILY
Qty: 90 TABLET | Refills: 1 | Status: SHIPPED | OUTPATIENT
Start: 2024-10-02

## 2024-10-02 RX ORDER — OMEGA-3-ACID ETHYL ESTERS 1 G/1
1 CAPSULE, LIQUID FILLED ORAL 2 TIMES DAILY
Qty: 180 CAPSULE | Refills: 3 | Status: SHIPPED | OUTPATIENT
Start: 2024-10-02

## 2024-10-02 RX ORDER — SENNOSIDES 8.6 MG
650 CAPSULE ORAL EVERY 8 HOURS PRN
Qty: 100 TABLET | Refills: 1 | Status: SHIPPED | OUTPATIENT
Start: 2024-10-02

## 2024-10-02 RX ORDER — ROPINIROLE 1 MG/1
1 TABLET, FILM COATED ORAL NIGHTLY
Qty: 90 TABLET | Refills: 1 | Status: SHIPPED | OUTPATIENT
Start: 2024-10-02

## 2024-10-02 RX ORDER — MONTELUKAST SODIUM 10 MG/1
10 TABLET ORAL NIGHTLY
Qty: 90 TABLET | Refills: 1 | Status: SHIPPED | OUTPATIENT
Start: 2024-10-02

## 2024-10-02 RX ORDER — AMLODIPINE BESYLATE 10 MG/1
10 TABLET ORAL DAILY
Qty: 90 TABLET | Refills: 2 | Status: SHIPPED | OUTPATIENT
Start: 2024-10-02

## 2024-10-02 RX ORDER — GLIMEPIRIDE 2 MG/1
2 TABLET ORAL
COMMUNITY
Start: 2024-04-18 | End: 2024-10-15

## 2024-10-02 RX ORDER — ASPIRIN 81 MG/1
81 TABLET ORAL DAILY
Qty: 90 TABLET | Refills: 3 | Status: SHIPPED | OUTPATIENT
Start: 2024-10-02

## 2024-10-02 RX ORDER — PANTOPRAZOLE SODIUM 40 MG/1
40 TABLET, DELAYED RELEASE ORAL DAILY
Qty: 90 TABLET | Refills: 3 | Status: SHIPPED | OUTPATIENT
Start: 2024-10-02

## 2024-10-02 RX ORDER — SACCHAROMYCES BOULARDII 250 MG
250 CAPSULE ORAL 2 TIMES DAILY
Qty: 90 CAPSULE | Refills: 2 | Status: SHIPPED | OUTPATIENT
Start: 2024-10-02

## 2024-10-02 NOTE — PROGRESS NOTES
"Chief Complaint  Medicare Wellness-subsequent    Subjective    {Problem List  Visit Diagnosis   Encounters  Notes  Medications  Labs  Result Review Imaging  Media :23}    Bob Escamilla presents to [facility]  History of Present Illness      Instructed patient to get the adult preventive immunization that are due at  the pharmacy, including - flu & pneumonia.    Instructed patient to follow-up with an eye doctor, either opthalmology or optometry with preference for opthalmology, for an annual diabetic eye exam.    Pt s/p eye doc Nov 2024.          Objective   Vital Signs:  /70 (BP Location: Left arm, Patient Position: Sitting, Cuff Size: Large Adult)   Pulse 50   Temp 98.2 °F (36.8 °C) (Temporal)   Resp 16   Ht 167.6 cm (65.98\")   Wt 115 kg (253 lb 9.6 oz)   SpO2 97%   BMI 40.95 kg/m²   Estimated body mass index is 40.95 kg/m² as calculated from the following:    Height as of this encounter: 167.6 cm (65.98\").    Weight as of this encounter: 115 kg (253 lb 9.6 oz).               Physical Exam  Constitutional:       Appearance: Normal appearance.   HENT:      Head: Normocephalic and atraumatic.   Eyes:      Conjunctiva/sclera: Conjunctivae normal.   Cardiovascular:      Rate and Rhythm: Normal rate and regular rhythm.      Heart sounds: Normal heart sounds.   Pulmonary:      Effort: Pulmonary effort is normal.      Breath sounds: Normal breath sounds.   Abdominal:      General: Bowel sounds are normal.      Palpations: Abdomen is soft.      Comments: Non-tender   Skin:     General: Skin is warm.   Neurological:      General: No focal deficit present.      Mental Status: He is alert and oriented to person, place, and time.      Comments: Patient status post normal diabetic foot exam today.  Normal gross motor and sensory sensation bilateral feet.  Skin intact bilateral feet.  Normal monofilament test bilateral feet.  Normal pedal pulses bilaterally.  I used this " opportunity to educate patient to check his legs and feet once daily for any cuts, skin breaks or any other lesions/inconsistencies; and to return to the healthcare provider immediately for further evaluation, patient was understanding.     Psychiatric:         Mood and Affect: Mood normal.         Behavior: Behavior normal.        Result Review :{Labs  Result Review  Imaging  Med Tab  Media  Procedures :23}    The following data was reviewed by: ANGIE Tang on 10/02/2024:  CMP          1/26/2024    15:27 2/29/2024    14:55   CMP   Glucose 112  119    BUN 17  15    Creatinine 0.87  0.96    Sodium 139  136    Potassium 4.5  4.2    Chloride 101  102    Calcium 9.3  8.9    Total Protein  6.2    Albumin  4.2    Globulin  2.0    Total Bilirubin  0.2    Alkaline Phosphatase  54    AST (SGOT)  27    ALT (SGPT)  38    BUN/Creatinine Ratio 20  15.6      CBC          1/26/2024    15:27 2/29/2024    14:55   CBC   WBC 6.8  6.78    RBC 4.24  4.24    Hemoglobin 13.5  13.6    Hematocrit 39.4  40.2    MCV 93  94.8    MCH 31.8  32.1    MCHC 34.3  33.8    RDW 12.8  13.2    Platelets 252  249      Lipid Panel          4/2/2024    08:51   Lipid Panel   Total Cholesterol 157    Triglycerides 232    HDL Cholesterol 34    VLDL Cholesterol 39    LDL Cholesterol  84      TSH          2/29/2024    14:55   TSH   TSH 1.260      A1C Last 3 Results          1/9/2024    08:34   HGBA1C Last 3 Results   Hemoglobin A1C 7.30      Microalbumin          4/2/2024    08:51   Microalbumin   Microalbumin, Urine 5.1        {Data reviewed (Optional):81908:::1}             Assessment and Plan {CC Problem List  Visit Diagnosis   ROS  Review (Popup)  Health Maintenance  Quality  BestPractice  Medications  SmartSets  SnapShot Encounters  Media :23}    Diagnoses and all orders for this visit:    1. Type 2 diabetes mellitus without complication, with long-term current use of insulin (Primary)  -     Cancel: CBC & Differential  -      Cancel: Comprehensive Metabolic Panel  -     Cancel: Hemoglobin A1c  -     Cancel: MicroAlbumin, Urine, Random - Urine, Clean Catch  -     CBC & Differential  -     Comprehensive Metabolic Panel  -     Hemoglobin A1c  -     MicroAlbumin, Urine, Random - Urine, Clean Catch    2. Primary hypertension  -     Cancel: CBC & Differential  -     Cancel: Comprehensive Metabolic Panel  -     Cancel: MicroAlbumin, Urine, Random - Urine, Clean Catch  -     CBC & Differential  -     Comprehensive Metabolic Panel  -     MicroAlbumin, Urine, Random - Urine, Clean Catch  -     amLODIPine (NORVASC) 10 MG tablet; Take 1 tablet by mouth Daily.  Dispense: 90 tablet; Refill: 2  -     losartan (COZAAR) 100 MG tablet; Take 1 tablet by mouth Daily.  Dispense: 90 tablet; Refill: 2    3. Gastroesophageal reflux disease without esophagitis    4. Mixed hyperlipidemia  -     Cancel: Comprehensive Metabolic Panel  -     Cancel: Lipid panel  -     Comprehensive Metabolic Panel  -     Lipid panel    5. Prostate cancer screening  -     PSA Screen    6. Environmental and seasonal allergies  -     fexofenadine (ALLEGRA) 180 MG tablet; Take 1 tablet by mouth Daily.  Dispense: 90 tablet; Refill: 1  -     montelukast (Singulair) 10 MG tablet; Take 1 tablet by mouth Every Night.  Dispense: 90 tablet; Refill: 1    7. Hypertriglyceridemia    8. RLS (restless legs syndrome)  -     rOPINIRole (REQUIP) 1 MG tablet; Take 1 tablet by mouth Every Night.  Dispense: 90 tablet; Refill: 1    Other orders  -     Cholecalciferol 50 MCG (2000 UT) tablet; Take 1 tablet by mouth Daily.  Dispense: 30 each; Refill: 5  -     omega-3 acid ethyl esters (LOVAZA) 1 g capsule; Take 1 capsule by mouth 2 (Two) Times a Day.  Dispense: 180 capsule; Refill: 3  -     pantoprazole (PROTONIX) 40 MG EC tablet; Take 1 tablet by mouth Daily.  Dispense: 90 tablet; Refill: 3  -     aspirin 81 MG EC tablet; Take 1 tablet by mouth Daily.  Dispense: 90 tablet; Refill: 3  -     saccharomyces  boulardii (Florastor) 250 MG capsule; Take 1 capsule by mouth 2 (Two) Times a Day.  Dispense: 90 capsule; Refill: 2  -     acetaminophen (TYLENOL) 650 MG 8 hr tablet; Take 1 tablet by mouth Every 8 (Eight) Hours As Needed for Mild Pain.  Dispense: 100 tablet; Refill: 1  -     multivitamin with minerals (Multivitamin Adult) tablet tablet; Take 1 tablet by mouth Daily for 360 days.  Dispense: 90 tablet; Refill: 3        All chronic conditions have been addressed and treated by the practice or other specialists. Medications have been reconciled and refilled as appropriate. Reiterated compliance and timely follow up appointments. Side effects of all new and old medications reviewed with the patient and patient willing to accept all risks involved. Advised RTO if no improvement or worsening of symptoms or if any new complaints arise. Patient advised to follow up with clinic or call after diagnostic tests, if patient does not hear from office 3 days after the test completion.        {Time Spent (Optional):75308}  Follow Up {Instructions Charge Capture  Follow-up Communications :23}    Return in about 6 months (around 4/2/2025) for Next scheduled follow up.  Patient was given instructions and counseling regarding his condition or for health maintenance advice. Please see specific information pulled into the AVS if appropriate.

## 2024-10-02 NOTE — ASSESSMENT & PLAN NOTE
Diabetes is being followed by endocrinologist.   Continue current treatment regimen.  Recommended an ADA diet.  Recommended a Mediterranean style of eating  Regular aerobic exercise.  Discussed foot care.  Reminded to get yearly retinal exam.  Diabetes will be reassessed in 6 months

## 2024-10-02 NOTE — PROGRESS NOTES
Subjective   The ABCs of the Annual Wellness Visit  Medicare Wellness Visit      Bob Escamilla is a 69 y.o. patient who presents for a Medicare Wellness Visit.    The following portions of the patient's history were reviewed and   updated as appropriate: allergies, current medications, past family history, past medical history, past social history, past surgical history, and problem list.    Compared to one year ago, the patient's physical   health is the same.  Compared to one year ago, the patient's mental   health is the same.    Recent Hospitalizations:  He was not admitted to the hospital during the last year.     Current Medical Providers:  Patient Care Team:  Dimas Alexander APRN as PCP - General (Nurse Practitioner)  Stephanie Vidal MD (Dermatology)    Outpatient Medications Prior to Visit   Medication Sig Dispense Refill    aluminum-magnesium hydroxide-simethicone (MAALOX/MYLANTA) 200-200-20 MG/5ML suspension Take 15 mL by mouth As Needed for Indigestion or Heartburn.      B Complex Vitamins (VITAMIN-B COMPLEX PO) Take  by mouth.      celecoxib (CeleBREX) 200 MG capsule TAKE ONE CAPSULE BY MOUTH DAILY WITH FOOD 90 capsule 0    diphenhydrAMINE (BENADRYL) 25 MG tablet Take  by mouth.      Ferrous Gluconate (IRON 27 PO) Take 1 tablet by mouth Daily.      ferrous sulfate 325 (65 FE) MG EC tablet Take 1 tablet by mouth Daily.      glimepiride (AMARYL) 2 MG tablet Take 1 tablet by mouth.      Glucosamine-Chondroitin (OSTEO BI-FLEX REGULAR STRENGTH PO) Take 1 tablet by mouth 2 (Two) Times a Day.      glucose blood test strip DX E11.9 check bs  daily accu check guide strips 100 each 5    glucose monitor monitoring kit 1 each As Needed (check bs daily). Dx E11.9 check bs daily/give all supplies best covered by his insurance 1 each 1    hydrOXYzine (ATARAX) 25 MG tablet TAKE TWO TABLETS BY MOUTH AT NIGHT AS NEEDED FOR ITCHING, ALLERGIES, OR ANXIETY 60 tablet 1    Lancets misc DX E11.9 Check  bs once daily 100 each 1    Misc Natural Products (PROSTATE HEALTH PO) Take  by mouth.      mupirocin (BACTROBAN) 2 % ointment       NON FORMULARY Take 1 tablet by mouth 2 (Two) Times a Day. Focus Factor      oxymetazoline (AFRIN) 0.05 % nasal spray Administer 2 sprays into the nostril(s) as directed by provider Every Night.      pioglitazone (ACTOS) 30 MG tablet TAKE 1 TABLET BY MOUTH DAILY 90 tablet 1    acetaminophen (TYLENOL) 650 MG 8 hr tablet Take 1 tablet by mouth Every 8 (Eight) Hours As Needed for Mild Pain.      amLODIPine (NORVASC) 10 MG tablet Take 1 tablet by mouth Daily. 90 tablet 1    aspirin 81 MG EC tablet Take 1 tablet by mouth Daily.      Cholecalciferol 50 MCG (2000 UT) tablet Take 1 tablet by mouth Daily.      fexofenadine (ALLEGRA) 180 MG tablet TAKE 1 TABLET BY MOUTH DAILY 90 tablet 1    fish oil-omega-3 fatty acids 1000 MG capsule Take 1 capsule by mouth Daily. 180 capsule 3    losartan (COZAAR) 100 MG tablet Take 1 tablet by mouth Daily. 90 tablet 1    montelukast (Singulair) 10 MG tablet Take 1 tablet by mouth Every Night. 90 tablet 1    multivitamin with minerals (MULTIVITAMIN ADULT PO) Take 1 tablet by mouth Every Night. 90 tablet 2    omega-3 acid ethyl esters (LOVAZA) 1 g capsule       pantoprazole (PROTONIX) 40 MG EC tablet Take 1 tablet by mouth Daily. 90 tablet 3    Probiotic Product (PROBIOTIC PO) Take 1 capsule by mouth Daily.      rOPINIRole (REQUIP) 1 MG tablet Take 1 tablet by mouth Every Night. 90 tablet 1    terbinafine (lamiSIL) 250 MG tablet       empagliflozin (Jardiance) 10 MG tablet tablet Take 1 tablet by mouth Every Morning. (Patient not taking: Reported on 10/2/2024) 30 tablet 3    metFORMIN ER (GLUCOPHAGE-XR) 500 MG 24 hr tablet  (Patient not taking: Reported on 10/2/2024)       No facility-administered medications prior to visit.     No opioid medication identified on active medication list. I have reviewed chart for other potential  high risk medication/s and  "harmful drug interactions in the elderly.      Aspirin is on active medication list. Aspirin use is indicated based on review of current medical condition/s. Pros and cons of this therapy have been discussed today. Benefits of this medication outweigh potential harm.  Patient has been encouraged to continue taking this medication.  .      Patient Active Problem List   Diagnosis    Type 2 diabetes mellitus without complication, with long-term current use of insulin    Primary hypertension    Gastroesophageal reflux disease without esophagitis    Environmental and seasonal allergies    RLS (restless legs syndrome)    Elevated liver enzymes    Special screening examination for neoplasm of prostate    Anemia    Hyperlipidemia    History of laparoscopic cholecystectomy    Bilateral leg edema    Chronic diarrhea    Chronic pain of both hips    History of right hip replacement    Anxiety disorder    Chronic midline low back pain with sciatica    Hypertriglyceridemia    Prostate cancer screening    Ankle edema, bilateral    Medicare annual wellness visit, subsequent     Advance Care Planning Advance Directive is not on file.  ACP discussion was held with the patient during this visit. Patient does not have an advance directive, information provided.            Objective   Vitals:    10/02/24 0804   BP: 144/70   BP Location: Left arm   Patient Position: Sitting   Cuff Size: Large Adult   Pulse: 50   Resp: 16   Temp: 98.2 °F (36.8 °C)   TempSrc: Temporal   SpO2: 97%   Weight: 115 kg (253 lb 9.6 oz)   Height: 167.6 cm (65.98\")       Estimated body mass index is 40.95 kg/m² as calculated from the following:    Height as of this encounter: 167.6 cm (65.98\").    Weight as of this encounter: 115 kg (253 lb 9.6 oz).            Does the patient have evidence of cognitive impairment? No                                                                                                Health  Risk Assessment    Smoking Status:  Social " History     Tobacco Use   Smoking Status Never   Smokeless Tobacco Never     Alcohol Consumption:  Social History     Substance and Sexual Activity   Alcohol Use No       Fall Risk Screen  STEADI Fall Risk Assessment was completed, and patient is at LOW risk for falls.Assessment completed on:10/2/2024    Depression Screening:      10/2/2024     8:03 AM   PHQ-2/PHQ-9 Depression Screening   Little Interest or Pleasure in Doing Things 0-->not at all   Feeling Down, Depressed or Hopeless 0-->not at all   PHQ-9: Brief Depression Severity Measure Score 0     Health Habits and Functional and Cognitive Screening:      10/2/2024     8:00 AM   Functional & Cognitive Status   Do you have difficulty preparing food and eating? No   Do you have difficulty bathing yourself, getting dressed or grooming yourself? No   Do you have difficulty using the toilet? No   Do you have difficulty moving around from place to place? No   Do you have trouble with steps or getting out of a bed or a chair? No   Current Diet Diabetic Diet   Dental Exam Up to date   Eye Exam Up to date   Exercise (times per week) Other   Current Exercises Include Other;Walking   Do you need help using the phone?  No   Are you deaf or do you have serious difficulty hearing?  No   Do you need help to go to places out of walking distance? No   Do you need help shopping? No   Do you need help preparing meals?  No   Do you need help with housework?  No   Do you need help with laundry? No   Do you need help taking your medications? No   Do you need help managing money? No   Do you ever drive or ride in a car without wearing a seat belt? No   Have you felt unusual stress, anger or loneliness in the last month? Yes   Who do you live with? Spouse   If you need help, do you have trouble finding someone available to you? No   Have you been bothered in the last four weeks by sexual problems? No   Do you have difficulty concentrating, remembering or making decisions? No            Age-appropriate Screening Schedule:  Refer to the list below for future screening recommendations based on patient's age, sex and/or medical conditions. Orders for these recommended tests are listed in the plan section. The patient has been provided with a written plan.    Health Maintenance List  Health Maintenance   Topic Date Due    HEMOGLOBIN A1C  07/09/2024    INFLUENZA VACCINE  08/01/2024    DIABETIC EYE EXAM  10/16/2024    COVID-19 Vaccine (6 - 2023-24 season) 12/22/2024 (Originally 9/1/2024)    BMI FOLLOWUP  03/06/2025    LIPID PANEL  04/02/2025    URINE MICROALBUMIN  04/02/2025    ANNUAL WELLNESS VISIT  10/02/2025    DIABETIC FOOT EXAM  10/02/2025    COLORECTAL CANCER SCREENING  10/07/2025    TDAP/TD VACCINES (2 - Td or Tdap) 10/27/2033    HEPATITIS C SCREENING  Completed    Pneumococcal Vaccine 65+  Completed    ZOSTER VACCINE  Completed                                                                                                                                                CMS Preventative Services Quick Reference  Risk Factors Identified During Encounter  Immunizations Discussed/Encouraged: Influenza and Prevnar 20 (Pneumococcal 20-valent conjugate)  Dental Screening Recommended  Vision Screening Recommended    The above risks/problems have been discussed with the patient.  Pertinent information has been shared with the patient in the After Visit Summary.  An After Visit Summary and PPPS were made available to the patient.    Follow Up:   Next Medicare Wellness visit to be scheduled in 1 year.         Additional E&M Note during same encounter follows:  Patient has additional, significant, and separately identifiable condition(s)/problem(s) that require work above and beyond the Medicare Wellness Visit     Chief Complaint  Medicare Wellness-subsequent    Subjective   69-year-old white male with a history of multiple medical problems here for annual Medicare wellness visit and chronic disease  "management visit.  Patient reports he is doing well in general.      Instructed patient to get the adult preventive immunization that are due at  the pharmacy, including - flu & pneumonia/Prevnar 20.    Instructed patient to follow-up with an eye doctor, either opthalmology or optometry with preference for opthalmology, for an annual diabetic eye exam.    Pt s/p eye doc Nov 2024.  Patient reports he takes aspirin once a day.  Patient reports he feels steady on his feet & is not concerned about falling.  Patient denied any fall in the last 12 months.          Bob is also being seen today for an annual adult preventative physical exam.                 Objective   Vital Signs:  /70 (BP Location: Left arm, Patient Position: Sitting, Cuff Size: Large Adult)   Pulse 50   Temp 98.2 °F (36.8 °C) (Temporal)   Resp 16   Ht 167.6 cm (65.98\")   Wt 115 kg (253 lb 9.6 oz)   SpO2 97%   BMI 40.95 kg/m²   Physical Exam  Constitutional:       Appearance: Normal appearance. He is obese.   HENT:      Head: Normocephalic and atraumatic.   Eyes:      Conjunctiva/sclera: Conjunctivae normal.   Cardiovascular:      Rate and Rhythm: Normal rate and regular rhythm.      Heart sounds: Normal heart sounds.      Comments: 1+ bilateral ankle edema.  Pulmonary:      Effort: Pulmonary effort is normal.      Breath sounds: Normal breath sounds.   Abdominal:      General: Bowel sounds are normal.      Palpations: Abdomen is soft.      Comments: Non-tender   Skin:     General: Skin is warm.   Neurological:      General: No focal deficit present.      Mental Status: He is alert and oriented to person, place, and time.      Comments: Patient status post normal diabetic foot exam today.  Normal gross motor and sensory sensation bilateral feet.  Skin intact bilateral feet.  Normal monofilament test bilateral feet.  Normal pedal pulses bilaterally.  I used this opportunity to educate patient to check his legs and feet once daily for any " cuts, skin breaks or any other lesions/inconsistencies; and to return to the healthcare provider immediately for further evaluation, patient was understanding.     Psychiatric:         Mood and Affect: Mood normal.         Behavior: Behavior normal.                 Assessment and Plan               Type 2 diabetes mellitus without complication, with long-term current use of insulin  Diabetes is  being followed by endocrinologist .   Continue current treatment regimen.  Recommended an ADA diet.  Recommended a Mediterranean style of eating  Regular aerobic exercise.  Discussed foot care.  Reminded to get yearly retinal exam.  Diabetes will be reassessed in 6 months  Primary hypertension  Hypertension is stable and controlled  Continue current treatment regimen.  Dietary sodium restriction.  Weight loss.  Ambulatory blood pressure monitoring.  Blood pressure will be reassessed in 3 months.  Discussed the importance of healthy diet, nutrition, and lifestyle. Recommend low salt, fat/cholesterol diet and avoid concentrated sweets. Encouraged DASH diet along with fresh fruits & vegetables and low fat dairy products. Counseled patient to exercise/walk as tolerated. Avoid tobacco and alcohol use.    Gastroesophageal reflux disease without esophagitis  Stable  Mixed hyperlipidemia   Lipid abnormalities are stable    Plan:  Continue same medication/s without change.      Discussed medication dosage, use, side effects, and goals of treatment in detail.    Counseled patient on lifestyle modifications to help control hyperlipidemia.     Patient Treatment Goals:   LDL goal is less than 70    Followup in 6 months.  Prostate cancer screening    Environmental and seasonal allergies    Hypertriglyceridemia   Lipid abnormalities are stable    Plan:  Continue same medication/s without change.      Discussed medication dosage, use, side effects, and goals of treatment in detail.    Counseled patient on lifestyle modifications to help  control hyperlipidemia.     Patient Treatment Goals:   LDL goal is less than 70    Followup in 6 months.  Discussed the importance of healthy diet, nutrition, and lifestyle. Recommend low salt, fat/cholesterol diet and avoid concentrated sweets. Encouraged DASH diet along with fresh fruits & vegetables and low fat dairy products. Counseled patient to exercise/walk as tolerated. Avoid tobacco and alcohol use.    RLS (restless legs syndrome)  Stable  Ankle edema, bilateral  Recommend compression stockings for 8 hours a day during the day while awake.  Medicare annual wellness visit, subsequent  Counseling was provided on nutrition, physical activity, development, and injury prevention, dental health, and safe sex practices patient verbalizes understanding no additional questions were asked.         Orders Placed This Encounter   Procedures    PSA Screen     Order Specific Question:   Release to patient     Answer:   Routine Release [[id number]]    Comprehensive Metabolic Panel     Order Specific Question:   Release to patient     Answer:   Routine Release [[id number]]    Hemoglobin A1c     Order Specific Question:   Release to patient     Answer:   Routine Release [[id number]]    Lipid panel     Order Specific Question:   Release to patient     Answer:   Routine Release [[id number]]    MicroAlbumin, Urine, Random - Urine, Clean Catch     Order Specific Question:   Release to patient     Answer:   Routine Release [[id number]]    CBC & Differential     Order Specific Question:   Manual Differential     Answer:   No     Order Specific Question:   Release to patient     Answer:   Routine Release [[id number]]     New Medications Ordered This Visit   Medications    amLODIPine (NORVASC) 10 MG tablet     Sig: Take 1 tablet by mouth Daily.     Dispense:  90 tablet     Refill:  2    Cholecalciferol 50 MCG (2000 UT) tablet     Sig: Take 1 tablet by mouth Daily.     Dispense:  30 each     Refill:  5    fexofenadine (ALLEGRA) 180  MG tablet     Sig: Take 1 tablet by mouth Daily.     Dispense:  90 tablet     Refill:  1    losartan (COZAAR) 100 MG tablet     Sig: Take 1 tablet by mouth Daily.     Dispense:  90 tablet     Refill:  2    montelukast (Singulair) 10 MG tablet     Sig: Take 1 tablet by mouth Every Night.     Dispense:  90 tablet     Refill:  1    omega-3 acid ethyl esters (LOVAZA) 1 g capsule     Sig: Take 1 capsule by mouth 2 (Two) Times a Day.     Dispense:  180 capsule     Refill:  3    pantoprazole (PROTONIX) 40 MG EC tablet     Sig: Take 1 tablet by mouth Daily.     Dispense:  90 tablet     Refill:  3    rOPINIRole (REQUIP) 1 MG tablet     Sig: Take 1 tablet by mouth Every Night.     Dispense:  90 tablet     Refill:  1    aspirin 81 MG EC tablet     Sig: Take 1 tablet by mouth Daily.     Dispense:  90 tablet     Refill:  3    saccharomyces boulardii (Florastor) 250 MG capsule     Sig: Take 1 capsule by mouth 2 (Two) Times a Day.     Dispense:  90 capsule     Refill:  2    acetaminophen (TYLENOL) 650 MG 8 hr tablet     Sig: Take 1 tablet by mouth Every 8 (Eight) Hours As Needed for Mild Pain.     Dispense:  100 tablet     Refill:  1    multivitamin with minerals (Multivitamin Adult) tablet tablet     Sig: Take 1 tablet by mouth Daily for 360 days.     Dispense:  90 tablet     Refill:  3   Patient is asked to return to clinic in 6 months from now.    All chronic conditions have been addressed and treated by the practice or other specialists. Medications have been reconciled and refilled as appropriate. Reiterated compliance and timely follow up appointments. Side effects of all new and old medications reviewed with the patient and patient willing to accept all risks involved. Advised RTO if no improvement or worsening of symptoms or if any new complaints arise. Patient advised to follow up with clinic or call after diagnostic tests, if patient does not hear from office 3 days after the test completion.          Follow Up   Return  in about 6 months (around 4/2/2025) for Next scheduled follow up.  Patient was given instructions and counseling regarding his condition or for health maintenance advice. Please see specific information pulled into the AVS if appropriate.

## 2024-12-18 DIAGNOSIS — M54.42 CHRONIC BILATERAL LOW BACK PAIN WITH BILATERAL SCIATICA: Chronic | ICD-10-CM

## 2024-12-18 DIAGNOSIS — G89.29 CHRONIC BILATERAL LOW BACK PAIN WITH BILATERAL SCIATICA: Chronic | ICD-10-CM

## 2024-12-18 DIAGNOSIS — F41.9 ANXIETY DISORDER, UNSPECIFIED TYPE: ICD-10-CM

## 2024-12-18 DIAGNOSIS — M47.817 FACET ARTHROPATHY, LUMBOSACRAL: Chronic | ICD-10-CM

## 2024-12-18 DIAGNOSIS — M54.41 CHRONIC BILATERAL LOW BACK PAIN WITH BILATERAL SCIATICA: Chronic | ICD-10-CM

## 2024-12-18 RX ORDER — CELECOXIB 200 MG/1
CAPSULE ORAL
Qty: 90 CAPSULE | Refills: 0 | Status: SHIPPED | OUTPATIENT
Start: 2024-12-18

## 2024-12-18 RX ORDER — HYDROXYZINE HYDROCHLORIDE 25 MG/1
TABLET, FILM COATED ORAL
Qty: 60 TABLET | Refills: 1 | Status: SHIPPED | OUTPATIENT
Start: 2024-12-18

## 2025-01-10 ENCOUNTER — OFFICE VISIT (OUTPATIENT)
Dept: FAMILY MEDICINE CLINIC | Facility: CLINIC | Age: 70
End: 2025-01-10
Payer: MEDICARE

## 2025-01-10 ENCOUNTER — HOSPITAL ENCOUNTER (OUTPATIENT)
Dept: GENERAL RADIOLOGY | Facility: HOSPITAL | Age: 70
Discharge: HOME | End: 2025-01-10
Payer: MEDICARE

## 2025-01-10 VITALS
DIASTOLIC BLOOD PRESSURE: 68 MMHG | BODY MASS INDEX: 41.82 KG/M2 | HEART RATE: 57 BPM | HEIGHT: 66 IN | WEIGHT: 260.2 LBS | SYSTOLIC BLOOD PRESSURE: 138 MMHG | TEMPERATURE: 99.2 F | OXYGEN SATURATION: 96 %

## 2025-01-10 DIAGNOSIS — J45.20 MILD INTERMITTENT ASTHMA, UNSPECIFIED WHETHER COMPLICATED: ICD-10-CM

## 2025-01-10 DIAGNOSIS — J22 LOWER RESPIRATORY INFECTION: ICD-10-CM

## 2025-01-10 DIAGNOSIS — J22 LOWER RESPIRATORY INFECTION: Primary | ICD-10-CM

## 2025-01-10 DIAGNOSIS — I10 PRIMARY HYPERTENSION: ICD-10-CM

## 2025-01-10 LAB
ALBUMIN SERPL-MCNC: 3.9 G/DL (ref 3.5–5.2)
ALBUMIN/GLOB SERPL: 1.6 G/DL
ALP SERPL-CCNC: 66 U/L (ref 39–117)
ALT SERPL-CCNC: 25 U/L (ref 1–41)
AST SERPL-CCNC: 18 U/L (ref 1–40)
BASOPHILS # BLD AUTO: 0.06 10*3/MM3 (ref 0–0.2)
BASOPHILS NFR BLD AUTO: 1 % (ref 0–1.5)
BILIRUB SERPL-MCNC: 0.5 MG/DL (ref 0–1.2)
BUN SERPL-MCNC: 13 MG/DL (ref 8–23)
BUN/CREAT SERPL: 14.4 (ref 7–25)
CALCIUM SERPL-MCNC: 8.9 MG/DL (ref 8.6–10.5)
CHLORIDE SERPL-SCNC: 104 MMOL/L (ref 98–107)
CO2 SERPL-SCNC: 24 MMOL/L (ref 22–29)
CREAT SERPL-MCNC: 0.9 MG/DL (ref 0.76–1.27)
EGFRCR SERPLBLD CKD-EPI 2021: 93.6 ML/MIN/1.73
EOSINOPHIL # BLD AUTO: 0.13 10*3/MM3 (ref 0–0.4)
EOSINOPHIL NFR BLD AUTO: 2.1 % (ref 0.3–6.2)
ERYTHROCYTE [DISTWIDTH] IN BLOOD BY AUTOMATED COUNT: 12.7 % (ref 12.3–15.4)
GLOBULIN SER CALC-MCNC: 2.4 GM/DL
GLUCOSE SERPL-MCNC: 150 MG/DL (ref 65–99)
HCT VFR BLD AUTO: 39.5 % (ref 37.5–51)
HGB BLD-MCNC: 13.7 G/DL (ref 13–17.7)
IMM GRANULOCYTES # BLD AUTO: 0.02 10*3/MM3 (ref 0–0.05)
IMM GRANULOCYTES NFR BLD AUTO: 0.3 % (ref 0–0.5)
LYMPHOCYTES # BLD AUTO: 1.9 10*3/MM3 (ref 0.7–3.1)
LYMPHOCYTES NFR BLD AUTO: 30.2 % (ref 19.6–45.3)
MCH RBC QN AUTO: 33.4 PG (ref 26.6–33)
MCHC RBC AUTO-ENTMCNC: 34.7 G/DL (ref 31.5–35.7)
MCV RBC AUTO: 96.3 FL (ref 79–97)
MONOCYTES # BLD AUTO: 0.56 10*3/MM3 (ref 0.1–0.9)
MONOCYTES NFR BLD AUTO: 8.9 % (ref 5–12)
NEUTROPHILS # BLD AUTO: 3.63 10*3/MM3 (ref 1.7–7)
NEUTROPHILS NFR BLD AUTO: 57.5 % (ref 42.7–76)
NRBC BLD AUTO-RTO: 0 /100 WBC (ref 0–0.2)
PLATELET # BLD AUTO: 238 10*3/MM3 (ref 140–450)
POTASSIUM SERPL-SCNC: 4.6 MMOL/L (ref 3.5–5.2)
PROT SERPL-MCNC: 6.3 G/DL (ref 6–8.5)
RBC # BLD AUTO: 4.1 10*6/MM3 (ref 4.14–5.8)
SODIUM SERPL-SCNC: 138 MMOL/L (ref 136–145)
WBC # BLD AUTO: 6.3 10*3/MM3 (ref 3.4–10.8)

## 2025-01-10 PROCEDURE — 1126F AMNT PAIN NOTED NONE PRSNT: CPT

## 2025-01-10 PROCEDURE — 3078F DIAST BP <80 MM HG: CPT

## 2025-01-10 PROCEDURE — 1160F RVW MEDS BY RX/DR IN RCRD: CPT

## 2025-01-10 PROCEDURE — 71046 X-RAY EXAM CHEST 2 VIEWS: CPT

## 2025-01-10 PROCEDURE — 1159F MED LIST DOCD IN RCRD: CPT

## 2025-01-10 PROCEDURE — 3075F SYST BP GE 130 - 139MM HG: CPT

## 2025-01-10 PROCEDURE — 99214 OFFICE O/P EST MOD 30 MIN: CPT

## 2025-01-10 RX ORDER — ALBUTEROL SULFATE 90 UG/1
2 INHALANT RESPIRATORY (INHALATION) EVERY 4 HOURS PRN
Qty: 6.7 G | Refills: 3 | Status: SHIPPED | OUTPATIENT
Start: 2025-01-10

## 2025-01-10 RX ORDER — AZITHROMYCIN 250 MG/1
TABLET, FILM COATED ORAL
Qty: 7 TABLET | Refills: 0 | Status: SHIPPED | OUTPATIENT
Start: 2025-01-10

## 2025-01-10 NOTE — PROGRESS NOTES
"Chief Complaint  Asthma (Pt is here for tightness of chest, difficulty breathing, coughing up phlem. For about 3 weeks )    Subjective          Bob Escamilla presents to [facility]       The patient is a 69-year-old male who presents today for a cough. He is new to me but a patient of ANGIE Quesada.    He has been experiencing a persistent cough for the past 3 weeks, which he attributes to his asthma. He reports chest tightness, fatigue, and occasional worsening of symptoms during deep inhalation or physical exertion such as snow shoveling. The cough is productive, yielding dark grayish mucus, and appears to be exacerbated by activity. He also reports a slight fever, muscle aches, chills, and shortness of breath. His temperature today was 99.2, which is high for him as his normal temperature is 97.5. He has not taken any antibiotics in the past month.  He is a non-smoker. He has a history of asthma since childhood, with occasional flare-ups depending on his current health status. Despite having an albuterol inhaler prescribed years ago, has not utilized it recently. No spirometry found per chart review. He has sleep apnea and uses a machine nightly.     With hypertension, he does not monitor his blood pressure at home and notes that it typically elevates during doctor's visits. BP today is 138/68.     He had diabetes, and his blood sugar this morning was 145.Last hemoglobin A1c was 6.%8 on 10/2/2024.            History of Present Illness      Objective   Vital Signs:  /68   Pulse 57   Temp 99.2 °F (37.3 °C) (Temporal)   Ht 167.6 cm (65.98\")   Wt 118 kg (260 lb 3.2 oz)   SpO2 96%   BMI 42.02 kg/m²   Estimated body mass index is 42.02 kg/m² as calculated from the following:    Height as of this encounter: 167.6 cm (65.98\").    Weight as of this encounter: 118 kg (260 lb 3.2 oz).            Physical Exam  Constitutional:       Appearance: Normal appearance. "   HENT:      Head: Normocephalic.   Cardiovascular:      Rate and Rhythm: Normal rate and regular rhythm.      Heart sounds: Normal heart sounds.   Pulmonary:      Effort: Pulmonary effort is normal.      Breath sounds: Examination of the right-lower field reveals rales. Examination of the left-lower field reveals rales. Rales present.   Musculoskeletal:      Cervical back: Neck supple.      Right lower leg: No edema.      Left lower leg: No edema.   Neurological:      Mental Status: He is alert and oriented to person, place, and time.   Psychiatric:         Mood and Affect: Mood normal.        Result Review :  The following data was reviewed by: ANGIE Oswald on 01/10/2025:  Common labs          2/29/2024    14:55 4/2/2024    08:51 10/2/2024    08:39   Common Labs   Glucose 119   132    BUN 15   13    Creatinine 0.96   0.83    Sodium 136   138    Potassium 4.2   4.5    Chloride 102   104    Calcium 8.9   9.4    Total Protein 6.2   6.9    Albumin 4.2   4.5    Total Bilirubin 0.2   0.7    Alkaline Phosphatase 54   74    AST (SGOT) 27   20    ALT (SGPT) 38   24    WBC 6.78   7.53    Hemoglobin 13.6   15.1    Hematocrit 40.2   43.9    Platelets 249   247    Total Cholesterol  157  160    Triglycerides  232  153    HDL Cholesterol  34  42    LDL Cholesterol   84  91    Hemoglobin A1C   6.80    Microalbumin, Urine  5.1     PSA   0.277                Assessment and Plan   Diagnoses and all orders for this visit:    1. Lower respiratory infection (Primary)  Assessment & Plan:  A chest x-ray will be ordered and Azithromycin (Z-Carrington) will be prescribed. A new albuterol inhaler will be provided. Mucinex is recommended to help expel mucus. Blood work will be conducted to rule out any infections. Tylenol can be taken as needed for fever and general discomfort.     Orders:  -     CBC w AUTO Differential  -     Comprehensive metabolic panel  -     XR Chest PA & Lateral; Future  -     azithromycin (ZITHROMAX) 250 MG tablet;  Take 2 tablets the first day, then 1 tablet daily for 4 days.  Dispense: 7 tablet; Refill: 0  -     albuterol sulfate  (90 Base) MCG/ACT inhaler; Inhale 2 puffs Every 4 (Four) Hours As Needed for Wheezing or Shortness of Air.  Dispense: 6.7 g; Refill: 3    2. Primary hypertension  Assessment & Plan:  Hypertension is stable and controlled  Continue current treatment regimen.  Blood pressure will be reassessed in 1 month.      3. Mild intermittent asthma, unspecified whether complicated  Assessment & Plan:  Start albuterol inhaler as needed. Will need spirometry when he recovers from acute illness.                    Patient agrees with plan of care and understands instructions. Call if worsening symptoms or any problems or concerns.     EMR Dragon/Transcription Disclaimer:  Much of this encounter note is an electronic transcription/translation of spoken language to printed text.  The electronic translation of spoken language may permit erroneous, or at times, nonsensical words or phrases to be inadvertently transcribed; Although I have reviewed the note for such errors, some may still exist.              Follow Up   Return in about 4 weeks (around 2/7/2025) for Recheck.  Patient was given instructions and counseling regarding his condition or for health maintenance advice. Please see specific information pulled into the AVS if appropriate.     Patient or patient representative verbalized consent for the use of Ambient Listening during the visit with  ANGIE Oswald for chart documentation. 1/10/2025  14:38 EST

## 2025-01-10 NOTE — LETTER
January 10, 2025     Patient: Bob Escamilla   YOB: 1955   Date of Visit: 1/10/2025       To Whom It May Concern:    It is my medical opinion that Bob Escamilla may return to work on 1/14/2025           Sincerely,        ANGIE Oswald    CC: No Recipients

## 2025-01-10 NOTE — ASSESSMENT & PLAN NOTE
Start albuterol inhaler as needed. Will need spirometry when he recovers from acute illness.

## 2025-01-10 NOTE — ASSESSMENT & PLAN NOTE
A chest x-ray will be ordered and Azithromycin (Z-Carrington) will be prescribed. A new albuterol inhaler will be provided. Mucinex is recommended to help expel mucus. Blood work will be conducted to rule out any infections. Tylenol can be taken as needed for fever and general discomfort.

## 2025-02-15 DIAGNOSIS — E11.9 TYPE 2 DIABETES MELLITUS WITHOUT COMPLICATION, WITH LONG-TERM CURRENT USE OF INSULIN: ICD-10-CM

## 2025-02-15 DIAGNOSIS — Z79.4 TYPE 2 DIABETES MELLITUS WITHOUT COMPLICATION, WITH LONG-TERM CURRENT USE OF INSULIN: ICD-10-CM

## 2025-02-17 RX ORDER — PIOGLITAZONE 30 MG/1
30 TABLET ORAL DAILY
Qty: 90 TABLET | Refills: 1 | Status: SHIPPED | OUTPATIENT
Start: 2025-02-17

## 2025-02-18 ENCOUNTER — OFFICE VISIT (OUTPATIENT)
Dept: FAMILY MEDICINE CLINIC | Facility: CLINIC | Age: 70
End: 2025-02-18
Payer: MEDICARE

## 2025-02-18 VITALS
RESPIRATION RATE: 16 BRPM | WEIGHT: 261 LBS | HEART RATE: 55 BPM | TEMPERATURE: 98.2 F | HEIGHT: 66 IN | DIASTOLIC BLOOD PRESSURE: 70 MMHG | BODY MASS INDEX: 41.95 KG/M2 | SYSTOLIC BLOOD PRESSURE: 110 MMHG | OXYGEN SATURATION: 97 %

## 2025-02-18 DIAGNOSIS — I10 PRIMARY HYPERTENSION: Chronic | ICD-10-CM

## 2025-02-18 DIAGNOSIS — E11.9 TYPE 2 DIABETES MELLITUS WITHOUT COMPLICATION, WITH LONG-TERM CURRENT USE OF INSULIN: Primary | Chronic | ICD-10-CM

## 2025-02-18 DIAGNOSIS — Z79.4 TYPE 2 DIABETES MELLITUS WITHOUT COMPLICATION, WITH LONG-TERM CURRENT USE OF INSULIN: Primary | Chronic | ICD-10-CM

## 2025-02-18 DIAGNOSIS — J30.89 ENVIRONMENTAL AND SEASONAL ALLERGIES: Chronic | ICD-10-CM

## 2025-02-18 DIAGNOSIS — E78.2 MIXED HYPERLIPIDEMIA: Chronic | ICD-10-CM

## 2025-02-18 DIAGNOSIS — R00.1 BRADYCARDIA: ICD-10-CM

## 2025-02-18 PROBLEM — Z12.5 SPECIAL SCREENING EXAMINATION FOR NEOPLASM OF PROSTATE: Status: RESOLVED | Noted: 2023-09-19 | Resolved: 2025-02-18

## 2025-02-18 PROBLEM — J22 LOWER RESPIRATORY INFECTION: Status: RESOLVED | Noted: 2025-01-10 | Resolved: 2025-02-18

## 2025-02-18 PROBLEM — Z12.5 PROSTATE CANCER SCREENING: Status: RESOLVED | Noted: 2024-10-02 | Resolved: 2025-02-18

## 2025-02-18 PROBLEM — Z00.00 MEDICARE ANNUAL WELLNESS VISIT, SUBSEQUENT: Status: RESOLVED | Noted: 2024-10-02 | Resolved: 2025-02-18

## 2025-02-18 PROCEDURE — 1159F MED LIST DOCD IN RCRD: CPT | Performed by: STUDENT IN AN ORGANIZED HEALTH CARE EDUCATION/TRAINING PROGRAM

## 2025-02-18 PROCEDURE — 3074F SYST BP LT 130 MM HG: CPT | Performed by: STUDENT IN AN ORGANIZED HEALTH CARE EDUCATION/TRAINING PROGRAM

## 2025-02-18 PROCEDURE — 1160F RVW MEDS BY RX/DR IN RCRD: CPT | Performed by: STUDENT IN AN ORGANIZED HEALTH CARE EDUCATION/TRAINING PROGRAM

## 2025-02-18 PROCEDURE — 3078F DIAST BP <80 MM HG: CPT | Performed by: STUDENT IN AN ORGANIZED HEALTH CARE EDUCATION/TRAINING PROGRAM

## 2025-02-18 PROCEDURE — 1126F AMNT PAIN NOTED NONE PRSNT: CPT | Performed by: STUDENT IN AN ORGANIZED HEALTH CARE EDUCATION/TRAINING PROGRAM

## 2025-02-18 PROCEDURE — 99214 OFFICE O/P EST MOD 30 MIN: CPT | Performed by: STUDENT IN AN ORGANIZED HEALTH CARE EDUCATION/TRAINING PROGRAM

## 2025-02-18 RX ORDER — OMEGA-3-ACID ETHYL ESTERS 1 G/1
1 CAPSULE, LIQUID FILLED ORAL 2 TIMES DAILY
Qty: 180 CAPSULE | Refills: 3 | Status: SHIPPED | OUTPATIENT
Start: 2025-02-18

## 2025-02-18 RX ORDER — GLIMEPIRIDE 2 MG/1
2 TABLET ORAL
Qty: 180 TABLET | Refills: 1 | Status: SHIPPED | OUTPATIENT
Start: 2025-02-18

## 2025-02-18 RX ORDER — AMLODIPINE BESYLATE 10 MG/1
10 TABLET ORAL DAILY
Qty: 90 TABLET | Refills: 2 | Status: SHIPPED | OUTPATIENT
Start: 2025-02-18

## 2025-02-18 RX ORDER — LOSARTAN POTASSIUM 100 MG/1
100 TABLET ORAL DAILY
Qty: 90 TABLET | Refills: 2 | Status: SHIPPED | OUTPATIENT
Start: 2025-02-18

## 2025-02-18 RX ORDER — MONTELUKAST SODIUM 10 MG/1
10 TABLET ORAL NIGHTLY
Qty: 90 TABLET | Refills: 1 | Status: SHIPPED | OUTPATIENT
Start: 2025-02-18

## 2025-02-18 RX ORDER — PANTOPRAZOLE SODIUM 40 MG/1
40 TABLET, DELAYED RELEASE ORAL DAILY
Qty: 90 TABLET | Refills: 3 | Status: SHIPPED | OUTPATIENT
Start: 2025-02-18

## 2025-02-18 RX ORDER — HYDRALAZINE HYDROCHLORIDE 10 MG/1
10 TABLET, FILM COATED ORAL 3 TIMES DAILY
Qty: 60 TABLET | Refills: 5 | Status: SHIPPED | OUTPATIENT
Start: 2025-02-18

## 2025-02-18 NOTE — ASSESSMENT & PLAN NOTE
Diabetes is stable.   Continue current treatment regimen.  Reminded to bring in blood sugar diary at next visit.  Recommended an ADA diet.  Recommended a Mediterranean style of eating  Regular aerobic exercise.  Discussed ways to avoid symptomatic hypoglycemia.  Discussed sick day management.  Discussed foot care.  Reminded to get yearly retinal exam.  Diabetes will be reassessed in 3 months    Patient states he has stopped Jardiance and metformin and does not want to take those 2 medications.  Patient states his sugar and diabetes are better controlled on glimepiride and Actos.    Discussed diabetes management and need for adequate BS control. Educated patient high A1c puts him/her at risk for MI, CVA, CKD, gastroparesis, foot ulcers, and frequent infections.   Pt informed of Rx for glucometer/test strips, and lancets as needed and explained to keep a blood sugar log. Return to office as instructed. Additionally diet compliance explained - avoid sugar candy, soda, high carbohydrate loads. Explained how losing weight can improve your health and achieve better blood sugar control. Being active can also help prevent or control the disorder. Recommended annual opthalmology follow -up due to diagnosis of diabetes. Annual Podiatry visit prn.

## 2025-02-18 NOTE — ASSESSMENT & PLAN NOTE
Hypertension is stable and controlled  Medication changes per orders.  Dietary sodium restriction.  Weight loss.  Regular aerobic exercise.  Ambulatory blood pressure monitoring.  Blood pressure will be reassessed  2 months .    Patient did not want to return to clinic within 1 month despite medication dose adjustments for hypertension.  Counseled patient to avoid taking more than recommended doses for any antihypertensive or any medication.  Started hydralazine 10 mg p.o. twice daily.  Instructed patient to keep a home blood pressure log and return to clinic with it.  Return to clinic sooner if his home blood pressure log reveals blood pressure which is out of range on more than 2 occasions.  Shared normal blood pressure values with patient and his after visit summary, patient voiced understanding.  Discussed the importance of healthy diet, nutrition, and lifestyle. Recommend low salt, fat/cholesterol diet and avoid concentrated sweets. Encouraged DASH diet along with fresh fruits & vegetables and low fat dairy products. Counseled patient to exercise/walk as tolerated. Avoid tobacco and alcohol use.

## 2025-02-18 NOTE — ASSESSMENT & PLAN NOTE
Lipid abnormalities are stable    Plan:  Continue same medication/s without change.      Discussed medication dosage, use, side effects, and goals of treatment in detail.    Counseled patient on lifestyle modifications to help control hyperlipidemia.     Patient Treatment Goals:   LDL goal is under 100    Followup in 6 months.  Patient LDL is less than 100.  Patient does not want to start statin at this time but continue fish oil capsules for hyperlipidemia management with cholesterol monitoring.  Discussed the importance of healthy diet, nutrition, and lifestyle. Recommend low salt, fat/cholesterol diet and avoid concentrated sweets. Encouraged DASH diet along with fresh fruits & vegetables and low fat dairy products. Counseled patient to exercise/walk as tolerated. Avoid tobacco and alcohol use.

## 2025-02-18 NOTE — PROGRESS NOTES
"Chief Complaint  Hypertension, Diabetes, and Cough (Improved since last ov)    Subjective        Bob Escamilla presents to [facility]  History of Present Illness  69-year-old white male with a history of hypertension and diabetes here for chronic disease management follow-up visit.    Patient reports he has been taking 2 pills of losartan 100 mg and 2 pills of amlodipine 10 mg daily for hypertension.  Patient states his blood pressure has been under better control with since he has started to take 2 pills and his job requires his blood pressure to be under control.    I counseled patient at length to avoid taking doses beyond the recommended maximum for both losartan and amlodipine.  Discussed with patient and started hydralazine 10 mg p.o. twice daily.  Also counseled patient to take losartan in the morning and amlodipine in the evening.  Patient states he does not have a home blood pressure monitor.  Instructed patient to buy a extra-large cuff for his home for blood pressure monitoring and return to clinic immediately if his blood pressure is out of range on more than 2 occasions.    Patient did not want to return to clinic before his next appointment in April.    Pt refused statin for HLD and pt states his LDL is under control on omega 3 fish oil.    Instructed patient to follow-up with an eye doctor, either opthalmology or optometry with preference for opthalmology, for an annual diabetic eye exam.      Patient denied any Chest pain, Shortness of Air, palpitations, Dizziness, Headache, Blurred vision, or weakness/numbness.   Pt uses CPAP qhs.  Hypertension    Diabetes    Cough        Objective   Vital Signs:  /70 (BP Location: Left arm, Patient Position: Sitting, Cuff Size: Adult)   Pulse 55   Temp 98.2 °F (36.8 °C) (Temporal)   Resp 16   Ht 167.6 cm (65.98\")   Wt 118 kg (261 lb)   SpO2 97%   BMI 42.15 kg/m²   Estimated body mass index is 42.15 kg/m² as calculated " "from the following:    Height as of this encounter: 167.6 cm (65.98\").    Weight as of this encounter: 118 kg (261 lb).               Physical Exam  Constitutional:       Appearance: Normal appearance.   HENT:      Head: Normocephalic and atraumatic.   Eyes:      Conjunctiva/sclera: Conjunctivae normal.   Cardiovascular:      Rate and Rhythm: Normal rate and regular rhythm.      Heart sounds: Normal heart sounds.   Pulmonary:      Effort: Pulmonary effort is normal.      Breath sounds: Normal breath sounds.   Abdominal:      General: Bowel sounds are normal.      Palpations: Abdomen is soft.      Comments: Non-tender   Skin:     General: Skin is warm.   Neurological:      General: No focal deficit present.      Mental Status: He is alert and oriented to person, place, and time.   Psychiatric:         Mood and Affect: Mood normal.         Behavior: Behavior normal.        Result Review :    The following data was reviewed by: ANGIE Tang on 02/18/2025:  CMP          2/29/2024    14:55 10/2/2024    08:39 1/10/2025    09:46   CMP   Glucose 119  132  150    BUN 15  13  13    Creatinine 0.96  0.83  0.90    EGFR 87.2  95.9  93.6    Sodium 136  138  138    Potassium 4.2  4.5  4.6    Chloride 102  104  104    Calcium 8.9  9.4  8.9    Total Protein 6.2  6.9  6.3    Albumin 4.2  4.5  3.9    Globulin 2.0  2.4  2.4    Total Bilirubin 0.2  0.7  0.5    Alkaline Phosphatase 54  74  66    AST (SGOT) 27  20  18    ALT (SGPT) 38  24  25    Albumin/Globulin Ratio 2.1  1.9  1.6    BUN/Creatinine Ratio 15.6  15.7  14.4      CBC          2/29/2024    14:55 10/2/2024    08:39 1/10/2025    09:46   CBC   WBC 6.78  7.53  6.30    RBC 4.24  4.58  4.10    Hemoglobin 13.6  15.1  13.7    Hematocrit 40.2  43.9  39.5    MCV 94.8  95.9  96.3    MCH 32.1  33.0  33.4    MCHC 33.8  34.4  34.7    RDW 13.2  12.9  12.7    Platelets 249  247  238      Lipid Panel          4/2/2024    08:51 10/2/2024    08:39   Lipid Panel   Total " Cholesterol 157  160    Triglycerides 232  153    HDL Cholesterol 34  42    VLDL Cholesterol 39  27    LDL Cholesterol  84  91      TSH          2/29/2024    14:55   TSH   TSH 1.260      A1C Last 3 Results          10/2/2024    08:39   HGBA1C Last 3 Results   Hemoglobin A1C 6.80      Microalbumin          4/2/2024    08:51   Microalbumin   Microalbumin, Urine 5.1      PSA          10/2/2024    08:39   PSA   PSA 0.277                   Assessment and Plan     Diagnoses and all orders for this visit:    1. Type 2 diabetes mellitus without complication, with long-term current use of insulin (Primary)  Assessment & Plan:  Diabetes is stable.   Continue current treatment regimen.  Reminded to bring in blood sugar diary at next visit.  Recommended an ADA diet.  Recommended a Mediterranean style of eating  Regular aerobic exercise.  Discussed ways to avoid symptomatic hypoglycemia.  Discussed sick day management.  Discussed foot care.  Reminded to get yearly retinal exam.  Diabetes will be reassessed in 3 months    Patient states he has stopped Jardiance and metformin and does not want to take those 2 medications.  Patient states his sugar and diabetes are better controlled on glimepiride and Actos.    Discussed diabetes management and need for adequate BS control. Educated patient high A1c puts him/her at risk for MI, CVA, CKD, gastroparesis, foot ulcers, and frequent infections.   Pt informed of Rx for glucometer/test strips, and lancets as needed and explained to keep a blood sugar log. Return to office as instructed. Additionally diet compliance explained - avoid sugar candy, soda, high carbohydrate loads. Explained how losing weight can improve your health and achieve better blood sugar control. Being active can also help prevent or control the disorder. Recommended annual opthalmology follow -up due to diagnosis of diabetes. Annual Podiatry visit prn.      Orders:  -     Cancel: Hemoglobin A1c  -     Microalbumin /  Creatinine Urine Ratio - Urine, Clean Catch  -     Hemoglobin A1c    2. Environmental and seasonal allergies  Assessment & Plan:  Stable    Orders:  -     montelukast (Singulair) 10 MG tablet; Take 1 tablet by mouth Every Night.  Dispense: 90 tablet; Refill: 1    3. Primary hypertension  Assessment & Plan:  Hypertension is stable and controlled  Medication changes per orders.  Dietary sodium restriction.  Weight loss.  Regular aerobic exercise.  Ambulatory blood pressure monitoring.  Blood pressure will be reassessed  2 months .    Patient did not want to return to clinic within 1 month despite medication dose adjustments for hypertension.  Counseled patient to avoid taking more than recommended doses for any antihypertensive or any medication.  Started hydralazine 10 mg p.o. twice daily.  Instructed patient to keep a home blood pressure log and return to clinic with it.  Return to clinic sooner if his home blood pressure log reveals blood pressure which is out of range on more than 2 occasions.  Shared normal blood pressure values with patient and his after visit summary, patient voiced understanding.  Discussed the importance of healthy diet, nutrition, and lifestyle. Recommend low salt, fat/cholesterol diet and avoid concentrated sweets. Encouraged DASH diet along with fresh fruits & vegetables and low fat dairy products. Counseled patient to exercise/walk as tolerated. Avoid tobacco and alcohol use.      Orders:  -     losartan (COZAAR) 100 MG tablet; Take 1 tablet by mouth Daily.  Dispense: 90 tablet; Refill: 2  -     amLODIPine (NORVASC) 10 MG tablet; Take 1 tablet by mouth Daily.  Dispense: 90 tablet; Refill: 2  -     hydrALAZINE (APRESOLINE) 10 MG tablet; Take 1 tablet by mouth 3 (Three) Times a Day.  Dispense: 60 tablet; Refill: 5    4. Mixed hyperlipidemia  Assessment & Plan:   Lipid abnormalities are stable    Plan:  Continue same medication/s without change.      Discussed medication dosage, use, side  effects, and goals of treatment in detail.    Counseled patient on lifestyle modifications to help control hyperlipidemia.     Patient Treatment Goals:   LDL goal is under 100    Followup in 6 months.  Patient LDL is less than 100.  Patient does not want to start statin at this time but continue fish oil capsules for hyperlipidemia management with cholesterol monitoring.  Discussed the importance of healthy diet, nutrition, and lifestyle. Recommend low salt, fat/cholesterol diet and avoid concentrated sweets. Encouraged DASH diet along with fresh fruits & vegetables and low fat dairy products. Counseled patient to exercise/walk as tolerated. Avoid tobacco and alcohol use.      Orders:  -     Cancel: Lipid Panel  -     Lipid Panel    5. Bradycardia  Assessment & Plan:  Patient asymptomatic and doing well.    Orders:  -     TSH  -     T4, free    Other orders  -     pantoprazole (PROTONIX) 40 MG EC tablet; Take 1 tablet by mouth Daily.  Dispense: 90 tablet; Refill: 3  -     omega-3 acid ethyl esters (LOVAZA) 1 g capsule; Take 1 capsule by mouth 2 (Two) Times a Day.  Dispense: 180 capsule; Refill: 3  -     glimepiride (Amaryl) 2 MG tablet; Take 1 tablet by mouth Every Morning Before Breakfast.  Dispense: 180 tablet; Refill: 1    Pt does not want to RTO prior to 2 month.      All chronic conditions have been addressed and treated by the practice or other specialists. Medications have been reconciled and refilled as appropriate. Reiterated compliance and timely follow up appointments. Side effects of all new and old medications reviewed with the patient and patient willing to accept all risks involved. Advised RTO if no improvement or worsening of symptoms or if any new complaints arise. Patient advised to follow up with clinic or call after diagnostic tests, if patient does not hear from office 3 days after the test completion.            Follow Up     Return in about 2 months (around 4/18/2025) for Next scheduled  follow up.  Patient was given instructions and counseling regarding his condition or for health maintenance advice. Please see specific information pulled into the AVS if appropriate.

## 2025-02-19 ENCOUNTER — TELEPHONE (OUTPATIENT)
Dept: FAMILY MEDICINE CLINIC | Facility: CLINIC | Age: 70
End: 2025-02-19
Payer: MEDICARE

## 2025-02-19 LAB
ALBUMIN/CREAT UR: 24 MG/G CREAT (ref 0–29)
CHOLEST SERPL-MCNC: 137 MG/DL (ref 100–199)
CREAT UR-MCNC: 126.1 MG/DL
HBA1C MFR BLD: 7.1 % (ref 4.8–5.6)
HDLC SERPL-MCNC: 37 MG/DL
LDLC SERPL CALC-MCNC: 81 MG/DL (ref 0–99)
MICROALBUMIN UR-MCNC: 29.7 UG/ML
T4 FREE SERPL-MCNC: 1.15 NG/DL (ref 0.82–1.77)
TRIGL SERPL-MCNC: 101 MG/DL (ref 0–149)
TSH SERPL DL<=0.005 MIU/L-ACNC: 1.42 UIU/ML (ref 0.45–4.5)
VLDLC SERPL CALC-MCNC: 19 MG/DL (ref 5–40)

## 2025-02-19 NOTE — TELEPHONE ENCOUNTER
"Relay     \"Recent labs revealed A1c of 7.1 which is in similar range when compared to previous values.  No change in diabetes regimen based on A1c results.   Cholesterol levels are stable.  Thyroid panel was within normal limits.   Urine was negative for any protein.     Recommend low fat, low cholesterol, low salt, and low sugar diet along with fresh fruits and vegetables. Also recommend walking for exercise 20 to 30 minutes per day about 5 days per week. \"                "

## 2025-02-19 NOTE — TELEPHONE ENCOUNTER
----- Message from Dimas Alexander sent at 2/19/2025 11:37 AM EST -----  Recent labs revealed A1c of 7.1 which is in similar range when compared to previous values.  No change in diabetes regimen based on A1c results.  Cholesterol levels are stable.  Thyroid panel was within normal limits.  Urine was negative for any protein.    Recommend low fat, low cholesterol, low salt, and low sugar diet along with fresh fruits and vegetables. Also recommend walking for exercise 20 to 30 minutes per day about 5 days per week.

## 2025-02-24 ENCOUNTER — TELEPHONE (OUTPATIENT)
Dept: FAMILY MEDICINE CLINIC | Facility: CLINIC | Age: 70
End: 2025-02-24

## 2025-02-24 NOTE — TELEPHONE ENCOUNTER
Caller: Bob Escamilla    Relationship: Self    Best call back number: [phone]     Who are you requesting to speak with (clinical staff, provider,  specific staff member): CLINICAL STAFF    What was the call regarding: PATIENT STATES THAT HE HAD DISCUSSED NEW BLOOD PRESSURE MEDICATION AT HIS LAST APPOINTMENT, BUT PHARMACY HAD NOT RECEIVED ANY NEW PRESCRIPTION. REQUESTS CALL BACK TO FOLLOW UP.

## 2025-02-24 NOTE — TELEPHONE ENCOUNTER
Called and spoke with pt.  Pt states the medication has been taken care of and received.  Pt did not need all medications refilled at this time; pt had already spoken to provider that he did not need all medications refilled.

## 2025-03-06 ENCOUNTER — PATIENT ROUNDING (BHMG ONLY) (OUTPATIENT)
Dept: URGENT CARE | Facility: CLINIC | Age: 70
End: 2025-03-06
Payer: MEDICARE

## 2025-03-06 NOTE — ED NOTES
Thank you for letting us care for you during your recent visit at [facility]. We would love to hear about your experience with us.         We’re always looking for ways to make our patients’ experiences even better. Do you have any recommendations on ways we may improve?         I appreciate you taking the time to respond. Please be on the lookout for a survey about your recent visit from [facility] via text or email. We would greatly appreciate if you could fill that out and turn it back in. We want your voice to be heard and we value your feedback.         Thank you for choosing [facility] for your healthcare needs.

## 2025-03-10 ENCOUNTER — TELEPHONE (OUTPATIENT)
Dept: FAMILY MEDICINE CLINIC | Facility: CLINIC | Age: 70
End: 2025-03-10

## 2025-03-10 NOTE — TELEPHONE ENCOUNTER
Provider: NESHA RUVALCABA    Caller: Bob Escamilla    Relationship to Patient: Self        Phone Number: [phone]    Reason for Call: PATIENT IS CALLING TO STATE HE STARTED A NEW MEDICATION FOR BLOOD PRESSURE.  HE STATES IT IS MAKING HIM SICK.  HE STATES HE IS LIGHT HEADED AND CAN NOT TAKE THE MEDICATION AND WORK.   HE IS REQUESTING A CALL BACK AT THE EARLIEST CONVENIENCE.    PLEASE ADVISE.

## 2025-03-10 NOTE — TELEPHONE ENCOUNTER
Called and spoke with patient regarding his BP.  Pt is scheduled to see PCP on 03/11.  Pt has cut his Hydralazine in half (since last Saturday night); he ha sbeen cutting the pill in half since then.  Pt is unable to take the medication 3x a day.

## 2025-03-11 ENCOUNTER — OFFICE VISIT (OUTPATIENT)
Dept: FAMILY MEDICINE CLINIC | Facility: CLINIC | Age: 70
End: 2025-03-11
Payer: MEDICARE

## 2025-03-11 VITALS
TEMPERATURE: 98.4 F | OXYGEN SATURATION: 99 % | SYSTOLIC BLOOD PRESSURE: 160 MMHG | DIASTOLIC BLOOD PRESSURE: 84 MMHG | RESPIRATION RATE: 16 BRPM | HEART RATE: 57 BPM | HEIGHT: 66 IN | BODY MASS INDEX: 41.21 KG/M2 | WEIGHT: 256.4 LBS

## 2025-03-11 DIAGNOSIS — I10 PRIMARY HYPERTENSION: Primary | ICD-10-CM

## 2025-03-11 PROCEDURE — 3051F HG A1C>EQUAL 7.0%<8.0%: CPT | Performed by: STUDENT IN AN ORGANIZED HEALTH CARE EDUCATION/TRAINING PROGRAM

## 2025-03-11 PROCEDURE — 99213 OFFICE O/P EST LOW 20 MIN: CPT | Performed by: STUDENT IN AN ORGANIZED HEALTH CARE EDUCATION/TRAINING PROGRAM

## 2025-03-11 PROCEDURE — 1126F AMNT PAIN NOTED NONE PRSNT: CPT | Performed by: STUDENT IN AN ORGANIZED HEALTH CARE EDUCATION/TRAINING PROGRAM

## 2025-03-11 PROCEDURE — 3079F DIAST BP 80-89 MM HG: CPT | Performed by: STUDENT IN AN ORGANIZED HEALTH CARE EDUCATION/TRAINING PROGRAM

## 2025-03-11 PROCEDURE — 3077F SYST BP >= 140 MM HG: CPT | Performed by: STUDENT IN AN ORGANIZED HEALTH CARE EDUCATION/TRAINING PROGRAM

## 2025-03-11 PROCEDURE — 1160F RVW MEDS BY RX/DR IN RCRD: CPT | Performed by: STUDENT IN AN ORGANIZED HEALTH CARE EDUCATION/TRAINING PROGRAM

## 2025-03-11 PROCEDURE — 1159F MED LIST DOCD IN RCRD: CPT | Performed by: STUDENT IN AN ORGANIZED HEALTH CARE EDUCATION/TRAINING PROGRAM

## 2025-03-11 RX ORDER — NIFEDIPINE 30 MG/1
30 TABLET, EXTENDED RELEASE ORAL DAILY
Qty: 30 TABLET | Refills: 1 | Status: SHIPPED | OUTPATIENT
Start: 2025-03-11 | End: 2025-03-11

## 2025-03-11 RX ORDER — NIFEDIPINE 30 MG/1
30 TABLET, EXTENDED RELEASE ORAL DAILY
Qty: 30 TABLET | Refills: 1 | Status: SHIPPED | OUTPATIENT
Start: 2025-03-11

## 2025-03-11 NOTE — PROGRESS NOTES
"Chief Complaint  Med Management (Hydralazine discussion; have not taken medication since Saturday evening)    Subjective        Bob Escamilla presents to [facility]  History of Present Illness  69-year-old white male with a history of hypertension here for follow-up due to uncontrolled blood pressure.      Pt c/o hydralazine made him feel dizzy and not right and did not lower his BP. Pt states he took hydralazine only 1-2 times.    Patient states otherwise he is doing well.      Objective   Vital Signs:  /84 (BP Location: Other (Comment), Patient Position: Sitting, Cuff Size: Large Adult) Comment (BP Location): L wrist  Pulse 57   Temp 98.4 °F (36.9 °C) (Temporal)   Resp 16   Ht 167.6 cm (65.98\")   Wt 116 kg (256 lb 6.4 oz)   SpO2 99%   BMI 41.40 kg/m²   Estimated body mass index is 41.4 kg/m² as calculated from the following:    Height as of this encounter: 167.6 cm (65.98\").    Weight as of this encounter: 116 kg (256 lb 6.4 oz).       Class 3 Severe Obesity (BMI >=40). Obesity-related health conditions include the following: hypertension, diabetes mellitus, and dyslipidemias. Obesity is unchanged. BMI is is above average; BMI management plan is completed. We discussed portion control and increasing exercise.      Physical Exam  Constitutional:       Appearance: Normal appearance.   HENT:      Head: Normocephalic and atraumatic.   Eyes:      Conjunctiva/sclera: Conjunctivae normal.   Cardiovascular:      Rate and Rhythm: Normal rate and regular rhythm.      Heart sounds: Normal heart sounds.   Pulmonary:      Effort: Pulmonary effort is normal.      Breath sounds: Normal breath sounds.   Abdominal:      General: Bowel sounds are normal.      Palpations: Abdomen is soft.      Comments: Non-tender   Skin:     General: Skin is warm.   Neurological:      General: No focal deficit present.      Mental Status: He is alert and oriented to person, place, and time. "   Psychiatric:         Mood and Affect: Mood normal.         Behavior: Behavior normal.        Result Review :    The following data was reviewed by: ANGIE Tang on 03/11/2025:  CMP          10/2/2024    08:39 1/10/2025    09:46   CMP   Glucose 132  150    BUN 13  13    Creatinine 0.83  0.90    EGFR 95.9  93.6    Sodium 138  138    Potassium 4.5  4.6    Chloride 104  104    Calcium 9.4  8.9    Total Protein 6.9  6.3    Albumin 4.5  3.9    Globulin 2.4  2.4    Total Bilirubin 0.7  0.5    Alkaline Phosphatase 74  66    AST (SGOT) 20  18    ALT (SGPT) 24  25    Albumin/Globulin Ratio 1.9  1.6    BUN/Creatinine Ratio 15.7  14.4      CBC          10/2/2024    08:39 1/10/2025    09:46   CBC   WBC 7.53  6.30    RBC 4.58  4.10    Hemoglobin 15.1  13.7    Hematocrit 43.9  39.5    MCV 95.9  96.3    MCH 33.0  33.4    MCHC 34.4  34.7    RDW 12.9  12.7    Platelets 247  238      Lipid Panel          4/2/2024    08:51 10/2/2024    08:39 2/18/2025    08:45   Lipid Panel   Total Cholesterol 157  160  137    Triglycerides 232  153  101    HDL Cholesterol 34  42  37    VLDL Cholesterol 39  27  19    LDL Cholesterol  84  91  81      TSH          2/18/2025    08:45   TSH   TSH 1.420      A1C Last 3 Results          10/2/2024    08:39 2/18/2025    08:45   HGBA1C Last 3 Results   Hemoglobin A1C 6.80  7.1      Microalbumin          4/2/2024    08:51 2/18/2025    08:59   Microalbumin   Microalbumin, Urine 5.1  29.7      PSA          10/2/2024    08:39   PSA   PSA 0.277                   Assessment and Plan     Diagnoses and all orders for this visit:    1. Primary hypertension (Primary)  Assessment & Plan:  Hypertension is uncontrolled  Medication changes per orders.  Dietary sodium restriction.  Weight loss.  Ambulatory blood pressure monitoring.  Blood pressure will be reassessedin 2 weeks.  Patient is very reluctant to change any of his medications states he has very low tolerance for medication changes.  Discussed  nifedipine which belongs to the same class of drugs as amlodipine and that patient should tolerate it well.  Nifedipine XL 30 mg daily.  Also provided patient paper prescription for nifedipine today.  Continue losartan 100 mg daily.  Instructed patient to take 1 pill in the morning the other in the evening.  Provided patient home blood pressure log sheets.  Return to clinic with home blood pressure log.  Instructed patient to stop amlodipine.  Discussed the importance of healthy diet, nutrition, and lifestyle. Recommend low salt, fat/cholesterol diet and avoid concentrated sweets. Encouraged DASH diet along with fresh fruits & vegetables and low fat dairy products. Counseled patient to exercise/walk as tolerated. Avoid tobacco and alcohol use.      Orders:  -     Discontinue: NIFEdipine XL (PROCARDIA XL) 30 MG 24 hr tablet; Take 1 tablet by mouth Daily.  Dispense: 30 tablet; Refill: 1  -     NIFEdipine XL (PROCARDIA XL) 30 MG 24 hr tablet; Take 1 tablet by mouth Daily.  Dispense: 30 tablet; Refill: 1        All chronic conditions have been addressed and treated by the practice or other specialists. Medications have been reconciled and refilled as appropriate. Reiterated compliance and timely follow up appointments. Side effects of all new and old medications reviewed with the patient and patient willing to accept all risks involved. Advised RTO if no improvement or worsening of symptoms or if any new complaints arise. Patient advised to follow up with clinic or call after diagnostic tests, if patient does not hear from office 3 days after the test completion.          Follow Up     Return in about 2 weeks (around 3/25/2025) for Next scheduled follow up.  Patient was given instructions and counseling regarding his condition or for health maintenance advice. Please see specific information pulled into the AVS if appropriate.

## 2025-03-11 NOTE — ASSESSMENT & PLAN NOTE
Hypertension is uncontrolled  Medication changes per orders.  Dietary sodium restriction.  Weight loss.  Ambulatory blood pressure monitoring.  Blood pressure will be reassessedin 2 weeks.  Patient is very reluctant to change any of his medications states he has very low tolerance for medication changes.  Discussed nifedipine which belongs to the same class of drugs as amlodipine and that patient should tolerate it well.  Nifedipine XL 30 mg daily.  Also provided patient paper prescription for nifedipine today.  Continue losartan 100 mg daily.  Instructed patient to take 1 pill in the morning the other in the evening.  Provided patient home blood pressure log sheets.  Return to clinic with home blood pressure log.  Instructed patient to stop amlodipine.  Discussed the importance of healthy diet, nutrition, and lifestyle. Recommend low salt, fat/cholesterol diet and avoid concentrated sweets. Encouraged DASH diet along with fresh fruits & vegetables and low fat dairy products. Counseled patient to exercise/walk as tolerated. Avoid tobacco and alcohol use.

## 2025-03-31 ENCOUNTER — TELEPHONE (OUTPATIENT)
Dept: FAMILY MEDICINE CLINIC | Facility: CLINIC | Age: 70
End: 2025-03-31
Payer: MEDICARE

## 2025-03-31 DIAGNOSIS — I10 PRIMARY HYPERTENSION: Primary | ICD-10-CM

## 2025-03-31 NOTE — TELEPHONE ENCOUNTER
Called and spoke with pt.  Relayed provider's instructions to pt; referral to Cardiology has been placed and he should receive a call from scheduling within 7-10 business days.  Pt voiced understanding.

## 2025-03-31 NOTE — TELEPHONE ENCOUNTER
Patient thinks he is having a allergic reaction to the new BP medication NIFEdipine XL (PROCARDIA XL) 30 MG 24 hr tablet that he started. Patient states that he has a rash in between his legs and would like to speak with Mesfin MOHAMUD this morning.

## 2025-03-31 NOTE — TELEPHONE ENCOUNTER
"Has appt with dermatology Dr. Knight; states it is a heat rash.  Said it was started \"soon after/a few days\" the start of the medication Nifedipine.  Pt was unable to work this weekend due to the rash.  Been taking Norvasc (since Dr. Fragoso) and Losartan together.  With this new medication, BP every morning it is 150/70.  Pt wanted provider to be aware thatrior to any dr appt pt takes 1/2 of both Norvasc (50 mg Losartan and 15 mg Norvasc).  Pt states he will take himself of Nifedipine and be on Norvasc (take 1 pill tonight and tomorrow), will take a 1.5 pill the next days up to his appt on 04/07  "

## 2025-04-07 ENCOUNTER — OFFICE VISIT (OUTPATIENT)
Dept: FAMILY MEDICINE CLINIC | Facility: CLINIC | Age: 70
End: 2025-04-07
Payer: MEDICARE

## 2025-04-07 VITALS
WEIGHT: 258 LBS | RESPIRATION RATE: 16 BRPM | DIASTOLIC BLOOD PRESSURE: 64 MMHG | HEART RATE: 54 BPM | SYSTOLIC BLOOD PRESSURE: 138 MMHG | TEMPERATURE: 98.2 F | BODY MASS INDEX: 41.46 KG/M2 | OXYGEN SATURATION: 97 % | HEIGHT: 66 IN

## 2025-04-07 DIAGNOSIS — I10 PRIMARY HYPERTENSION: Primary | ICD-10-CM

## 2025-04-07 RX ORDER — TERBINAFINE HYDROCHLORIDE 250 MG/1
250 TABLET ORAL
COMMUNITY
Start: 2025-04-01

## 2025-04-07 NOTE — PROGRESS NOTES
"Chief Complaint  Hypertension (Has appt with Cardiology next week. Took morning medications prior to appt.  ), Hyperlipidemia, and Diabetes    Subjective        Bob Escamilla presents to [facility]  History of Present Illness  69-year-old white male here for chronic disease management follow-up visit.    Pt states he resumed Norvasc 10mg and losartan 100mg qday at this time.  Pt states he stopped nifedipine on his own.  Discussed cardiology referral and patient's appointment next Monday with cardiology.  Informed patient he needs to follow-up with cardiology going forward in the future for hypertension management, patient voiced understanding.    Patient also follows endocrinology for diabetes management.  Patient denied any Chest pain, Shortness of Air, palpitations, Dizziness, Headache, Blurred vision, or weakness/numbness.       Instructed patient to follow-up with an eye doctor, either opthalmology or optometry with preference for opthalmology, for an annual diabetic eye exam.        Objective   Vital Signs:  /64 (BP Location: Left arm, Patient Position: Sitting, Cuff Size: Large Adult)   Pulse 54   Temp 98.2 °F (36.8 °C) (Temporal)   Resp 16   Ht 167.6 cm (65.98\")   Wt 117 kg (258 lb)   SpO2 97%   BMI 41.66 kg/m²   Estimated body mass index is 41.66 kg/m² as calculated from the following:    Height as of this encounter: 167.6 cm (65.98\").    Weight as of this encounter: 117 kg (258 lb).               Physical Exam  Constitutional:       Appearance: Normal appearance.   HENT:      Head: Normocephalic and atraumatic.   Eyes:      Conjunctiva/sclera: Conjunctivae normal.   Cardiovascular:      Rate and Rhythm: Normal rate and regular rhythm.      Heart sounds: Normal heart sounds.   Pulmonary:      Effort: Pulmonary effort is normal.      Breath sounds: Normal breath sounds.   Abdominal:      General: Bowel sounds are normal.      Palpations: Abdomen is soft.      " Comments: Non-tender   Skin:     General: Skin is warm.   Neurological:      General: No focal deficit present.      Mental Status: He is alert and oriented to person, place, and time.   Psychiatric:         Mood and Affect: Mood normal.         Behavior: Behavior normal.        Result Review :    The following data was reviewed by: ANGIE Tang on 04/07/2025:  CMP          10/2/2024    08:39 1/10/2025    09:46   CMP   Glucose 132  150    BUN 13  13    Creatinine 0.83  0.90    EGFR 95.9  93.6    Sodium 138  138    Potassium 4.5  4.6    Chloride 104  104    Calcium 9.4  8.9    Total Protein 6.9  6.3    Albumin 4.5  3.9    Globulin 2.4  2.4    Total Bilirubin 0.7  0.5    Alkaline Phosphatase 74  66    AST (SGOT) 20  18    ALT (SGPT) 24  25    Albumin/Globulin Ratio 1.9  1.6    BUN/Creatinine Ratio 15.7  14.4      CBC          10/2/2024    08:39 1/10/2025    09:46   CBC   WBC 7.53  6.30    RBC 4.58  4.10    Hemoglobin 15.1  13.7    Hematocrit 43.9  39.5    MCV 95.9  96.3    MCH 33.0  33.4    MCHC 34.4  34.7    RDW 12.9  12.7    Platelets 247  238      Lipid Panel          10/2/2024    08:39 2/18/2025    08:45   Lipid Panel   Total Cholesterol 160  137    Triglycerides 153  101    HDL Cholesterol 42  37    VLDL Cholesterol 27  19    LDL Cholesterol  91  81      TSH          2/18/2025    08:45   TSH   TSH 1.420      A1C Last 3 Results          10/2/2024    08:39 2/18/2025    08:45   HGBA1C Last 3 Results   Hemoglobin A1C 6.80  7.1      Microalbumin          2/18/2025    08:59   Microalbumin   Microalbumin, Urine 29.7      PSA          10/2/2024    08:39   PSA   PSA 0.277                   Assessment and Plan     Diagnoses and all orders for this visit:    1. Primary hypertension (Primary)  Assessment & Plan:  Hypertension is borderline  Continue current treatment regimen.  Referral to cardiology  Blood pressure will be reassessedin 6 months.  Instructed patient to follow-up with cardiology for blood  pressure management from here on.  Patient asymptomatic today.  Discussed the importance of healthy diet, nutrition, and lifestyle. Recommend low salt, fat/cholesterol diet and avoid concentrated sweets. Encouraged DASH diet along with fresh fruits & vegetables and low fat dairy products. Counseled patient to exercise/walk as tolerated. Avoid tobacco and alcohol use.            All chronic conditions have been addressed and treated by the practice or other specialists. Medications have been reconciled and refilled as appropriate. Reiterated compliance and timely follow up appointments. Side effects of all new and old medications reviewed with the patient and patient willing to accept all risks involved. Advised RTO if no improvement or worsening of symptoms or if any new complaints arise. Patient advised to follow up with clinic or call after diagnostic tests, if patient does not hear from office 3 days after the test completion.            Follow Up     Return in about 6 months (around 10/7/2025) for Next scheduled follow up.  Patient was given instructions and counseling regarding his condition or for health maintenance advice. Please see specific information pulled into the AVS if appropriate.

## 2025-04-07 NOTE — ASSESSMENT & PLAN NOTE
Hypertension is borderline  Continue current treatment regimen.  Referral to cardiology  Blood pressure will be reassessedin 6 months.  Instructed patient to follow-up with cardiology for blood pressure management from here on.  Patient asymptomatic today.  Discussed the importance of healthy diet, nutrition, and lifestyle. Recommend low salt, fat/cholesterol diet and avoid concentrated sweets. Encouraged DASH diet along with fresh fruits & vegetables and low fat dairy products. Counseled patient to exercise/walk as tolerated. Avoid tobacco and alcohol use.

## 2025-04-21 ENCOUNTER — OFFICE VISIT (OUTPATIENT)
Dept: CARDIOLOGY | Facility: CLINIC | Age: 70
End: 2025-04-21
Payer: MEDICARE

## 2025-04-21 VITALS
HEIGHT: 66 IN | HEART RATE: 58 BPM | DIASTOLIC BLOOD PRESSURE: 88 MMHG | SYSTOLIC BLOOD PRESSURE: 150 MMHG | WEIGHT: 261.4 LBS | BODY MASS INDEX: 42.01 KG/M2

## 2025-04-21 DIAGNOSIS — I10 PRIMARY HYPERTENSION: Primary | ICD-10-CM

## 2025-04-21 PROCEDURE — 1160F RVW MEDS BY RX/DR IN RCRD: CPT | Performed by: INTERNAL MEDICINE

## 2025-04-21 PROCEDURE — 99204 OFFICE O/P NEW MOD 45 MIN: CPT | Performed by: INTERNAL MEDICINE

## 2025-04-21 PROCEDURE — 3079F DIAST BP 80-89 MM HG: CPT | Performed by: INTERNAL MEDICINE

## 2025-04-21 PROCEDURE — 1159F MED LIST DOCD IN RCRD: CPT | Performed by: INTERNAL MEDICINE

## 2025-04-21 PROCEDURE — 93000 ELECTROCARDIOGRAM COMPLETE: CPT | Performed by: INTERNAL MEDICINE

## 2025-04-21 PROCEDURE — 3077F SYST BP >= 140 MM HG: CPT | Performed by: INTERNAL MEDICINE

## 2025-04-21 RX ORDER — VALSARTAN AND HYDROCHLOROTHIAZIDE 160; 25 MG/1; MG/1
1 TABLET ORAL DAILY
Qty: 30 TABLET | Refills: 11 | Status: SHIPPED | OUTPATIENT
Start: 2025-04-21

## 2025-04-21 RX ORDER — CHLORAL HYDRATE 500 MG
CAPSULE ORAL
COMMUNITY
End: 2025-04-21

## 2025-04-21 RX ORDER — GLUCOSAMINE SULFATE 500 MG
CAPSULE ORAL
COMMUNITY

## 2025-04-21 RX ORDER — AMLODIPINE BESYLATE 10 MG/1
10 TABLET ORAL DAILY
COMMUNITY

## 2025-04-21 NOTE — PROGRESS NOTES
Subjective:     Encounter Date:04/21/2025      Patient ID: Bob Escamilla is a 69 y.o. male.    Chief Complaint: HTN  HPI:   69-year-old man with hypertension, diabetes, morbid obesity BMI of 42.  He presents for evaluation of hypertension.  He has been treated with losartan and amlodipine which has not been well-controlled.  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  He had been doubling his losartan and amlodipine to good effect in the past however this put him at 200 mg of losartan and 15 mg of amlodipine daily.  He notes significant lower extremity edema.  He is a protuberant abdomen.  He does not do any regular exercise.  He does not drink or smoke.    The following portions of the patient's history were reviewed and updated as appropriate: allergies, current medications, past family history, past medical history, past social history, past surgical history and problem list.     REVIEW OF SYSTEMS:   All systems reviewed.  Pertinent positives identified in HPI.  All other systems are negative.    Past Medical History:   Diagnosis Date    Arthritis     Asthma     Calculus of gallbladder without cholecystitis without obstruction 08/30/2022    Diabetes mellitus     GERD (gastroesophageal reflux disease)     Hx of cholecystectomy 1/26/2024    Hypertension     Inguinal hernia of left side without obstruction or gangrene     Medicare annual wellness visit, subsequent 9/19/2023    Sleep apnea        Family History   Problem Relation Age of Onset    Dementia Mother     Heart disease Father     Dementia Father     No Known Problems Sister        Social History     Socioeconomic History    Marital status:    Tobacco Use    Smoking status: Never    Smokeless tobacco: Never   Vaping Use    Vaping status: Never Used   Substance and Sexual Activity    Alcohol use: No    Drug use: No       Allergies   Allergen Reactions    Benzamide Derivatives      Formaldehyde Itching     Pt is only able to wear cotton clothing    Benzonatate Rash    Corticosteroids Rash     An allergy to hydrocortisone cream, has had no reaction other than from an allergy test performed 20 years ago    Cortisone Rash    Trigonella Foenum-Graecum Rash       Past Surgical History:   Procedure Laterality Date    CATARACT EXTRACTION WITH INTRAOCULAR LENS IMPLANT Bilateral 01/2017    CHOLECYSTECTOMY N/A 08/30/2022    Procedure: CHOLECYSTECTOMY LAPAROSCOPIC WITH CHOLIANGIOGRAM;  Surgeon: Darin Juan MD;  Location: [facility];  Service: General;  Laterality: N/A;    COLONOSCOPY  10/07/2015    ENDOSCOPY N/A 2017    INGUINAL HERNIA REPAIR Left     REPLACEMENT TOTAL HIP LATERAL POSITION      right          ECG 12 Lead    Date/Time: 4/21/2025 3:47 PM  Performed by: Bela Dooley MD    Authorized by: Bela Dooley MD  Comparison: not compared with previous ECG   Rhythm: sinus rhythm  Rate: normal  Conduction: conduction normal  ST Segments: ST segments normal  T Waves: T waves normal  QRS axis: normal  Other: no other findings    Clinical impression: normal ECG             Objective:         PHYSICAL EXAM:  GEN: VSS, no distress,   Eyes: normal sclera, normal lids and lashes  HENT: moist mucus membranes,   Respiratory: CTAB, no rales or wheezes  CV: RRR, no murmurs, , +2 DP and 2+ carotid pulses b/l  GI: NABS, soft,  Nontender, nondistended  MSK: +1 lower extremity edema to the shins edema, no scoliosis or kyphosis  Skin: no rash, warm, dry  Heme/Lymph: no bruising or bleeding  Psych: organized thought, normal behavior and affect  Neuro: Cranial nerves grossly intact, Alert and Oriented x 3.         Assessment:          Diagnosis Plan   1. Primary hypertension           Plan:       1.  Hypertension: Continue amlodipine 10.  Switch losartan to valsartan/HCTZ and uptitrate as needed.  His heart rates are consistently in the high 40s to low 50s therefore would not be a good candidate for  beta-blockade.  Was nauseous with a possible rash related to nifedipine.  2.  Lower extremity edema related to obesity, amlodipine, Actos, hypertension.  Discussed sodium restriction.  3.  Obesity: Needs 150 minutes of moderate exercise per week.    Dimas, thank you very much for referring this kind patient to me. Please call me with any questions or concerns. I will see the patient again in the office in 6 weeks.          Bela Dooley MD  04/21/25  [facility]    Outpatient Encounter Medications as of 4/21/2025   Medication Sig Dispense Refill    acetaminophen (TYLENOL) 650 MG 8 hr tablet Take 1 tablet by mouth Every 8 (Eight) Hours As Needed for Mild Pain. 100 tablet 1    albuterol sulfate  (90 Base) MCG/ACT inhaler Inhale 2 puffs Every 4 (Four) Hours As Needed for Wheezing or Shortness of Air. 6.7 g 3    aluminum-magnesium hydroxide-simethicone (MAALOX/MYLANTA) 200-200-20 MG/5ML suspension Take 15 mL by mouth As Needed for Indigestion or Heartburn.      amLODIPine (NORVASC) 10 MG tablet Take 1 tablet by mouth Daily.      aspirin 81 MG EC tablet Take 1 tablet by mouth Daily. 90 tablet 3    B Complex Vitamins (VITAMIN-B COMPLEX PO) Take  by mouth.      celecoxib (CeleBREX) 200 MG capsule TAKE ONE CAPSULE BY MOUTH DAILY WITH FOOD 90 capsule 0    Cholecalciferol 50 MCG (2000 UT) tablet Take 1 tablet by mouth Daily. 30 each 5    diphenhydrAMINE (BENADRYL) 25 MG tablet Take  by mouth.      Ferrous Gluconate (IRON 27 PO) Take 1 tablet by mouth Daily.      ferrous sulfate 325 (65 FE) MG EC tablet Take 1 tablet by mouth Daily.      fexofenadine (ALLEGRA) 180 MG tablet Take 1 tablet by mouth Daily. 90 tablet 1    glimepiride (Amaryl) 2 MG tablet Take 1 tablet by mouth Every Morning Before Breakfast. 180 tablet 1    Glucosamine 500 MG capsule Take  by mouth.      Glucosamine-Chondroitin (OSTEO BI-FLEX REGULAR STRENGTH PO) Take 1 tablet by mouth 2 (Two) Times a Day.      glucose blood test strip DX  E11.9 check bs  daily accu check guide strips 100 each 5    guaiFENesin (MUCINEX) 600 MG 12 hr tablet Take 2 tablets by mouth 2 (Two) Times a Day. 30 tablet 0    hydrOXYzine (ATARAX) 25 MG tablet TAKE TWO TABLETS BY MOUTH AT NIGHT AS NEEDED FOR ITCHING, ALLERGIES OR ANXIETY (Patient taking differently: Take 1 tablet by mouth.) 60 tablet 1    Lancets misc DX E11.9 Check bs once daily 100 each 1    Misc Natural Products (PROSTATE HEALTH PO) Take  by mouth.      montelukast (Singulair) 10 MG tablet Take 1 tablet by mouth Every Night. 90 tablet 1    multivitamin with minerals (Multivitamin Adult) tablet tablet Take 1 tablet by mouth Daily for 360 days. 90 tablet 3    mupirocin (BACTROBAN) 2 % ointment       NON FORMULARY Take 1 tablet by mouth 2 (Two) Times a Day. Focus Factor      omega-3 acid ethyl esters (LOVAZA) 1 g capsule Take 1 capsule by mouth 2 (Two) Times a Day. 180 capsule 3    oxymetazoline (AFRIN) 0.05 % nasal spray Administer 2 sprays into the nostril(s) as directed by provider Every Night.      pantoprazole (PROTONIX) 40 MG EC tablet Take 1 tablet by mouth Daily. 90 tablet 3    pioglitazone (ACTOS) 30 MG tablet TAKE 1 TABLET BY MOUTH DAILY 90 tablet 1    rOPINIRole (REQUIP) 1 MG tablet Take 1 tablet by mouth Every Night. 90 tablet 1    saccharomyces boulardii (Florastor) 250 MG capsule Take 1 capsule by mouth 2 (Two) Times a Day. 90 capsule 2    terbinafine (lamiSIL) 250 MG tablet Take 1 tablet by mouth.      [DISCONTINUED] losartan (COZAAR) 100 MG tablet Take 1 tablet by mouth Daily. 90 tablet 2    [DISCONTINUED] NIFEdipine XL (PROCARDIA XL) 30 MG 24 hr tablet Take 1 tablet by mouth Daily. 30 tablet 1    [DISCONTINUED] Omega-3 Fatty Acids (fish oil) 1000 MG capsule capsule Take  by mouth.      valsartan-hydrochlorothiazide (Diovan HCT) 160-25 MG per tablet Take 1 tablet by mouth Daily. 30 tablet 11     No facility-administered encounter medications on file as of 4/21/2025.

## 2025-04-28 ENCOUNTER — TELEPHONE (OUTPATIENT)
Dept: CARDIOLOGY | Facility: CLINIC | Age: 70
End: 2025-04-28
Payer: MEDICARE

## 2025-04-28 NOTE — TELEPHONE ENCOUNTER
Patent provided the most recent BP readings for BU to review    4/22 - 153/65 HR 50  4/23 - 171/71 HR 52  4/24 - 146/67 HR 52  4/25 - 158/60 HR 50  4/26 - 158/60 HR 54  4/27 - 160/68 HR 48  4/28 - 136/63 HR 52    Also, stated that BU told him to stop a medication at his last visit. I didn't see in her AVS about stopping medication. Sent message to  about this.    Patient stated if  needs to call him, please call him at [phone]

## 2025-04-28 NOTE — TELEPHONE ENCOUNTER
I switched losartan to valsartan/HCTZ . He can take it twice a day and let us know next week what that does

## 2025-05-06 ENCOUNTER — TELEPHONE (OUTPATIENT)
Dept: CARDIOLOGY | Facility: CLINIC | Age: 70
End: 2025-05-06
Payer: MEDICARE

## 2025-05-06 RX ORDER — VALSARTAN AND HYDROCHLOROTHIAZIDE 320; 25 MG/1; MG/1
1 TABLET, FILM COATED ORAL DAILY
Qty: 30 TABLET | Refills: 11 | Status: SHIPPED | OUTPATIENT
Start: 2025-05-06 | End: 2026-05-06

## 2025-05-06 NOTE — TELEPHONE ENCOUNTER
Pt called to report his BP readin/29/25: 143/63 P: 52    25: 150/69 P: 49    24: 146/62 P: 47    25: 162/61 P: 49    5/3/25 147/68 P: 47    25: 144/65 P 46    25: 147/64 P: 48    25: 142/58 P: 50    These reading are 30 min after taking his morning meds.

## 2025-05-06 NOTE — TELEPHONE ENCOUNTER
PT states that the valsartan hydro 160-25 MG makes him fatigue. He is wanting to know if he could start off taking 1.5 daily until he can get used to it then go to the 320/25 MG tablet?

## 2025-06-02 ENCOUNTER — OFFICE VISIT (OUTPATIENT)
Dept: CARDIOLOGY | Facility: CLINIC | Age: 70
End: 2025-06-02
Payer: MEDICARE

## 2025-06-02 VITALS
HEIGHT: 66 IN | WEIGHT: 256 LBS | BODY MASS INDEX: 41.14 KG/M2 | OXYGEN SATURATION: 98 % | SYSTOLIC BLOOD PRESSURE: 124 MMHG | HEART RATE: 62 BPM | DIASTOLIC BLOOD PRESSURE: 80 MMHG

## 2025-06-02 DIAGNOSIS — I10 PRIMARY HYPERTENSION: Primary | ICD-10-CM

## 2025-06-02 PROCEDURE — 1160F RVW MEDS BY RX/DR IN RCRD: CPT | Performed by: INTERNAL MEDICINE

## 2025-06-02 PROCEDURE — 1159F MED LIST DOCD IN RCRD: CPT | Performed by: INTERNAL MEDICINE

## 2025-06-02 PROCEDURE — G2211 COMPLEX E/M VISIT ADD ON: HCPCS | Performed by: INTERNAL MEDICINE

## 2025-06-02 PROCEDURE — 3079F DIAST BP 80-89 MM HG: CPT | Performed by: INTERNAL MEDICINE

## 2025-06-02 PROCEDURE — 3074F SYST BP LT 130 MM HG: CPT | Performed by: INTERNAL MEDICINE

## 2025-06-02 PROCEDURE — 99213 OFFICE O/P EST LOW 20 MIN: CPT | Performed by: INTERNAL MEDICINE

## 2025-06-02 RX ORDER — VALSARTAN AND HYDROCHLOROTHIAZIDE 320; 25 MG/1; MG/1
1 TABLET, FILM COATED ORAL DAILY
Qty: 90 TABLET | Refills: 3 | Status: SHIPPED | OUTPATIENT
Start: 2025-06-02 | End: 2026-06-02

## 2025-06-02 NOTE — PROGRESS NOTES
Subjective:     Encounter Date: 06/02/25        Patient ID: Bob Escamilla is a 69 y.o. male.    Chief Complaint: HTN  HPI:   69-year-old man with hypertension, diabetes, morbid obesity BMI of 42.   I met him in April 2025 for difficult to control hypertension.  We adjusted his losartan amlodipine dosing.  He is now on valsartan/HCTZ as well as amlodipine.  He has sinus bradycardia which does not allow beta-blockade.  He has had an intolerance to nifedipine in the past.  He is currently on 10 of amlodipine and HCTZ 25, valsartan 320.  He is tolerating this well.  Pressures range between 125 and 140 systolic.  He has sleep apnea treated with a CPAP  He is a short distance .  He has a DOT license. He does not do any regular exercise.  He does not drink or smoke.  He is .    The following portions of the patient's history were reviewed and updated as appropriate: allergies, current medications, past family history, past medical history, past social history, past surgical history and problem list.     REVIEW OF SYSTEMS:   All systems reviewed.  Pertinent positives identified in HPI.  All other systems are negative.    Past Medical History:   Diagnosis Date    Arthritis     Asthma     Calculus of gallbladder without cholecystitis without obstruction 08/30/2022    Diabetes mellitus     GERD (gastroesophageal reflux disease)     Hx of cholecystectomy 1/26/2024    Hypertension     Inguinal hernia of left side without obstruction or gangrene     Medicare annual wellness visit, subsequent 9/19/2023    Sleep apnea        Family History   Problem Relation Age of Onset    Dementia Mother     Heart disease Father     Dementia Father     No Known Problems Sister        Social History     Socioeconomic History    Marital status:    Tobacco Use    Smoking status: Never    Smokeless tobacco: Never   Vaping Use    Vaping status: Never Used   Substance and Sexual Activity    Alcohol use: No    Drug use:  No       Allergies   Allergen Reactions    Benzamide Derivatives     Formaldehyde Itching     Pt is only able to wear cotton clothing    Benzonatate Rash    Corticosteroids Rash     An allergy to hydrocortisone cream, has had no reaction other than from an allergy test performed 20 years ago    Cortisone Rash    Fenugreek (Trigonella Foenum-Graecum) Rash       Past Surgical History:   Procedure Laterality Date    CATARACT EXTRACTION WITH INTRAOCULAR LENS IMPLANT Bilateral 01/2017    CHOLECYSTECTOMY N/A 08/30/2022    Procedure: CHOLECYSTECTOMY LAPAROSCOPIC WITH CHOLIANGIOGRAM;  Surgeon: Darin Juan MD;  Location: [facility];  Service: General;  Laterality: N/A;    COLONOSCOPY  10/07/2015    ENDOSCOPY N/A 2017    INGUINAL HERNIA REPAIR Left     REPLACEMENT TOTAL HIP LATERAL POSITION      right        Procedures       Objective:         PHYSICAL EXAM:  GEN: VSS, no distress,   Eyes: normal sclera, normal lids and lashes  HENT: moist mucus membranes,   Respiratory: CTAB, no rales or wheezes  CV: RRR, no murmurs, , +2 DP and 2+ carotid pulses b/l  GI: NABS, soft,  Nontender, nondistended  MSK: + trace extremity edema to the shins edema, no scoliosis or kyphosis  Skin: no rash, warm, dry  Heme/Lymph: no bruising or bleeding  Psych: organized thought, normal behavior and affect  Neuro: Cranial nerves grossly intact, Alert and Oriented x 3.         Assessment:         No diagnosis found.     Plan:       1.  Hypertension: Improved control on amlodipine, valsartan/HCTZ.    2.  Lower extremity edema related to obesity, amlodipine, Actos, hypertension.  Discussed sodium restriction.  Improved with hydrochlorothiazide  3.  Obesity: Needs 150 minutes of moderate exercise per week.    Dimas, thank you very much for referring this kind patient to me. Please call me with any questions or concerns. I will see the patient again in the office in 6 weeks.          Bela Dooley MD  06/02/25  [facility]    Outpatient Encounter Medications as of 6/2/2025   Medication Sig Dispense Refill    acetaminophen (TYLENOL) 650 MG 8 hr tablet Take 1 tablet by mouth Every 8 (Eight) Hours As Needed for Mild Pain. 100 tablet 1    albuterol sulfate  (90 Base) MCG/ACT inhaler Inhale 2 puffs Every 4 (Four) Hours As Needed for Wheezing or Shortness of Air. 6.7 g 3    aluminum-magnesium hydroxide-simethicone (MAALOX/MYLANTA) 200-200-20 MG/5ML suspension Take 15 mL by mouth As Needed for Indigestion or Heartburn.      amLODIPine (NORVASC) 10 MG tablet Take 1 tablet by mouth Daily.      aspirin 81 MG EC tablet Take 1 tablet by mouth Daily. 90 tablet 3    B Complex Vitamins (VITAMIN-B COMPLEX PO) Take  by mouth.      celecoxib (CeleBREX) 200 MG capsule TAKE ONE CAPSULE BY MOUTH DAILY WITH FOOD 90 capsule 0    Cholecalciferol 50 MCG (2000 UT) tablet Take 1 tablet by mouth Daily. 30 each 5    cyclobenzaprine (FLEXERIL) 5 MG tablet Take 1 tablet by mouth 2 (Two) Times a Day As Needed for Muscle Spasms. 30 tablet 0    diphenhydrAMINE (BENADRYL) 25 MG tablet Take  by mouth.      Ferrous Gluconate (IRON 27 PO) Take 1 tablet by mouth Daily.      ferrous sulfate 325 (65 FE) MG EC tablet Take 1 tablet by mouth Daily.      fexofenadine (ALLEGRA) 180 MG tablet Take 1 tablet by mouth Daily. 90 tablet 1    glimepiride (Amaryl) 2 MG tablet Take 1 tablet by mouth Every Morning Before Breakfast. 180 tablet 1    Glucosamine 500 MG capsule Take  by mouth.      Glucosamine-Chondroitin (OSTEO BI-FLEX REGULAR STRENGTH PO) Take 1 tablet by mouth 2 (Two) Times a Day.      glucose blood test strip DX E11.9 check bs  daily accu check guide strips 100 each 5    guaiFENesin (MUCINEX) 600 MG 12 hr tablet Take 2 tablets by mouth 2 (Two) Times a Day. 30 tablet 0    hydrOXYzine (ATARAX) 25 MG tablet TAKE TWO TABLETS BY MOUTH AT NIGHT AS NEEDED FOR ITCHING, ALLERGIES OR ANXIETY (Patient taking differently: Take 1 tablet by mouth.) 60 tablet 1    Lancets  misc DX E11.9 Check bs once daily 100 each 1    methylPREDNISolone (MEDROL) 4 MG dose pack Take as directed on package instructions. 21 tablet 0    Misc Natural Products (PROSTATE HEALTH PO) Take  by mouth.      montelukast (Singulair) 10 MG tablet Take 1 tablet by mouth Every Night. 90 tablet 1    multivitamin with minerals (Multivitamin Adult) tablet tablet Take 1 tablet by mouth Daily for 360 days. 90 tablet 3    mupirocin (BACTROBAN) 2 % ointment       NON FORMULARY Take 1 tablet by mouth 2 (Two) Times a Day. Focus Factor      omega-3 acid ethyl esters (LOVAZA) 1 g capsule Take 1 capsule by mouth 2 (Two) Times a Day. 180 capsule 3    oxymetazoline (AFRIN) 0.05 % nasal spray Administer 2 sprays into the nostril(s) as directed by provider Every Night.      pantoprazole (PROTONIX) 40 MG EC tablet Take 1 tablet by mouth Daily. 90 tablet 3    pioglitazone (ACTOS) 30 MG tablet TAKE 1 TABLET BY MOUTH DAILY 90 tablet 1    rOPINIRole (REQUIP) 1 MG tablet Take 1 tablet by mouth Every Night. 90 tablet 1    saccharomyces boulardii (Florastor) 250 MG capsule Take 1 capsule by mouth 2 (Two) Times a Day. 90 capsule 2    terbinafine (lamiSIL) 250 MG tablet Take 1 tablet by mouth.      valsartan-hydrochlorothiazide (DIOVAN-HCT) 320-25 MG per tablet Take 1 tablet by mouth Daily. 30 tablet 11     No facility-administered encounter medications on file as of 6/2/2025.

## 2025-06-23 ENCOUNTER — APPOINTMENT (OUTPATIENT)
Dept: MRI IMAGING | Facility: HOSPITAL | Age: 70
End: 2025-06-23
Payer: MEDICARE

## 2025-06-23 ENCOUNTER — APPOINTMENT (OUTPATIENT)
Dept: GENERAL RADIOLOGY | Facility: HOSPITAL | Age: 70
End: 2025-06-23
Payer: MEDICARE

## 2025-06-23 ENCOUNTER — HOSPITAL ENCOUNTER (OUTPATIENT)
Facility: HOSPITAL | Age: 70
Setting detail: OBSERVATION
Discharge: HOME | End: 2025-06-24
Attending: EMERGENCY MEDICINE | Admitting: EMERGENCY MEDICINE
Payer: MEDICARE

## 2025-06-23 DIAGNOSIS — M54.2 NECK PAIN: ICD-10-CM

## 2025-06-23 DIAGNOSIS — M54.12 ACUTE CERVICAL RADICULOPATHY: Primary | ICD-10-CM

## 2025-06-23 DIAGNOSIS — R20.2 FACIAL PARESTHESIA: ICD-10-CM

## 2025-06-23 PROBLEM — R20.0 LEFT SIDED NUMBNESS: Status: ACTIVE | Noted: 2025-06-23

## 2025-06-23 LAB
ALBUMIN SERPL-MCNC: 4.1 G/DL (ref 3.5–5.2)
ALBUMIN/GLOB SERPL: 1.3 G/DL
ALP SERPL-CCNC: 70 U/L (ref 39–117)
ALT SERPL W P-5'-P-CCNC: 24 U/L (ref 1–41)
ANION GAP SERPL CALCULATED.3IONS-SCNC: 12.6 MMOL/L (ref 5–15)
APTT PPP: 26.6 SECONDS (ref 22.7–35.4)
AST SERPL-CCNC: 23 U/L (ref 1–40)
BASOPHILS # BLD AUTO: 0.07 10*3/MM3 (ref 0–0.2)
BASOPHILS NFR BLD AUTO: 0.8 % (ref 0–1.5)
BILIRUB SERPL-MCNC: 0.2 MG/DL (ref 0–1.2)
BUN SERPL-MCNC: 19 MG/DL (ref 8–23)
BUN/CREAT SERPL: 18.4 (ref 7–25)
CALCIUM SPEC-SCNC: 9.2 MG/DL (ref 8.6–10.5)
CHLORIDE SERPL-SCNC: 101 MMOL/L (ref 98–107)
CO2 SERPL-SCNC: 22.4 MMOL/L (ref 22–29)
CREAT SERPL-MCNC: 1.03 MG/DL (ref 0.76–1.27)
DEPRECATED RDW RBC AUTO: 45 FL (ref 37–54)
EGFRCR SERPLBLD CKD-EPI 2021: 79.6 ML/MIN/1.73
EOSINOPHIL # BLD AUTO: 0.16 10*3/MM3 (ref 0–0.4)
EOSINOPHIL NFR BLD AUTO: 1.7 % (ref 0.3–6.2)
ERYTHROCYTE [DISTWIDTH] IN BLOOD BY AUTOMATED COUNT: 12.8 % (ref 12.3–15.4)
GLOBULIN UR ELPH-MCNC: 3.1 GM/DL
GLUCOSE BLDC GLUCOMTR-MCNC: 243 MG/DL (ref 70–130)
GLUCOSE SERPL-MCNC: 109 MG/DL (ref 65–99)
HCT VFR BLD AUTO: 42.2 % (ref 37.5–51)
HGB BLD-MCNC: 14.3 G/DL (ref 13–17.7)
IMM GRANULOCYTES # BLD AUTO: 0.03 10*3/MM3 (ref 0–0.05)
IMM GRANULOCYTES NFR BLD AUTO: 0.3 % (ref 0–0.5)
INR PPP: 1.03 (ref 0.9–1.1)
LYMPHOCYTES # BLD AUTO: 2.36 10*3/MM3 (ref 0.7–3.1)
LYMPHOCYTES NFR BLD AUTO: 25.7 % (ref 19.6–45.3)
MCH RBC QN AUTO: 32.7 PG (ref 26.6–33)
MCHC RBC AUTO-ENTMCNC: 33.9 G/DL (ref 31.5–35.7)
MCV RBC AUTO: 96.6 FL (ref 79–97)
MONOCYTES # BLD AUTO: 0.78 10*3/MM3 (ref 0.1–0.9)
MONOCYTES NFR BLD AUTO: 8.5 % (ref 5–12)
NEUTROPHILS NFR BLD AUTO: 5.77 10*3/MM3 (ref 1.7–7)
NEUTROPHILS NFR BLD AUTO: 63 % (ref 42.7–76)
NRBC BLD AUTO-RTO: 0 /100 WBC (ref 0–0.2)
PLATELET # BLD AUTO: 272 10*3/MM3 (ref 140–450)
PMV BLD AUTO: 9.8 FL (ref 6–12)
POTASSIUM SERPL-SCNC: 4.2 MMOL/L (ref 3.5–5.2)
PROT SERPL-MCNC: 7.2 G/DL (ref 6–8.5)
PROTHROMBIN TIME: 13.4 SECONDS (ref 11.7–14.2)
QT INTERVAL: 397 MS
QTC INTERVAL: 389 MS
RBC # BLD AUTO: 4.37 10*6/MM3 (ref 4.14–5.8)
SODIUM SERPL-SCNC: 136 MMOL/L (ref 136–145)
TSH SERPL DL<=0.05 MIU/L-ACNC: 2.15 UIU/ML (ref 0.27–4.2)
WBC NRBC COR # BLD AUTO: 9.17 10*3/MM3 (ref 3.4–10.8)

## 2025-06-23 PROCEDURE — A9577 INJ MULTIHANCE: HCPCS | Performed by: EMERGENCY MEDICINE

## 2025-06-23 PROCEDURE — 72156 MRI NECK SPINE W/O & W/DYE: CPT

## 2025-06-23 PROCEDURE — 25510000002 GADOBENATE DIMEGLUMINE 529 MG/ML SOLUTION: Performed by: EMERGENCY MEDICINE

## 2025-06-23 PROCEDURE — 73030 X-RAY EXAM OF SHOULDER: CPT

## 2025-06-23 PROCEDURE — 70553 MRI BRAIN STEM W/O & W/DYE: CPT

## 2025-06-23 PROCEDURE — 25010000002 DEXAMETHASONE PER 1 MG: Performed by: EMERGENCY MEDICINE

## 2025-06-23 PROCEDURE — 85025 COMPLETE CBC W/AUTO DIFF WBC: CPT | Performed by: EMERGENCY MEDICINE

## 2025-06-23 PROCEDURE — 85730 THROMBOPLASTIN TIME PARTIAL: CPT | Performed by: EMERGENCY MEDICINE

## 2025-06-23 PROCEDURE — 80053 COMPREHEN METABOLIC PANEL: CPT | Performed by: EMERGENCY MEDICINE

## 2025-06-23 PROCEDURE — 76014 MR SFTY IMPLT&/FB ASMT STF 1: CPT

## 2025-06-23 PROCEDURE — 82948 REAGENT STRIP/BLOOD GLUCOSE: CPT

## 2025-06-23 PROCEDURE — 85610 PROTHROMBIN TIME: CPT | Performed by: EMERGENCY MEDICINE

## 2025-06-23 PROCEDURE — 93005 ELECTROCARDIOGRAM TRACING: CPT | Performed by: PHYSICIAN ASSISTANT

## 2025-06-23 PROCEDURE — G0378 HOSPITAL OBSERVATION PER HR: HCPCS

## 2025-06-23 PROCEDURE — 96374 THER/PROPH/DIAG INJ IV PUSH: CPT

## 2025-06-23 PROCEDURE — 84443 ASSAY THYROID STIM HORMONE: CPT | Performed by: NURSE PRACTITIONER

## 2025-06-23 PROCEDURE — 63710000001 INSULIN LISPRO (HUMAN) PER 5 UNITS: Performed by: NURSE PRACTITIONER

## 2025-06-23 PROCEDURE — 25010000002 DEXAMETHASONE PER 1 MG: Performed by: NURSE PRACTITIONER

## 2025-06-23 PROCEDURE — 96376 TX/PRO/DX INJ SAME DRUG ADON: CPT

## 2025-06-23 PROCEDURE — 99285 EMERGENCY DEPT VISIT HI MDM: CPT

## 2025-06-23 PROCEDURE — 71045 X-RAY EXAM CHEST 1 VIEW: CPT

## 2025-06-23 PROCEDURE — 93010 ELECTROCARDIOGRAM REPORT: CPT | Performed by: INTERNAL MEDICINE

## 2025-06-23 RX ORDER — SODIUM CHLORIDE 0.9 % (FLUSH) 0.9 %
10 SYRINGE (ML) INJECTION AS NEEDED
Status: DISCONTINUED | OUTPATIENT
Start: 2025-06-23 | End: 2025-06-24 | Stop reason: HOSPADM

## 2025-06-23 RX ORDER — MORPHINE SULFATE 2 MG/ML
4 INJECTION, SOLUTION INTRAMUSCULAR; INTRAVENOUS ONCE
Status: DISCONTINUED | OUTPATIENT
Start: 2025-06-23 | End: 2025-06-24 | Stop reason: HOSPADM

## 2025-06-23 RX ORDER — VIT B/MIN/SAW PALMETTO/PYGEUM 160-12.5MG
1 CAPSULE ORAL 2 TIMES DAILY
COMMUNITY

## 2025-06-23 RX ORDER — ACETAMINOPHEN 325 MG/1
650 TABLET ORAL EVERY 6 HOURS PRN
Status: DISCONTINUED | OUTPATIENT
Start: 2025-06-23 | End: 2025-06-24 | Stop reason: HOSPADM

## 2025-06-23 RX ORDER — DEXTROSE MONOHYDRATE 25 G/50ML
25 INJECTION, SOLUTION INTRAVENOUS
Status: DISCONTINUED | OUTPATIENT
Start: 2025-06-23 | End: 2025-06-24 | Stop reason: HOSPADM

## 2025-06-23 RX ORDER — LIDOCAINE 4 G/G
1 PATCH TOPICAL ONCE
Status: COMPLETED | OUTPATIENT
Start: 2025-06-23 | End: 2025-06-24

## 2025-06-23 RX ORDER — NICOTINE POLACRILEX 4 MG
15 LOZENGE BUCCAL
Status: DISCONTINUED | OUTPATIENT
Start: 2025-06-23 | End: 2025-06-24 | Stop reason: HOSPADM

## 2025-06-23 RX ORDER — VALSARTAN 160 MG/1
320 TABLET ORAL
Status: DISCONTINUED | OUTPATIENT
Start: 2025-06-24 | End: 2025-06-24 | Stop reason: HOSPADM

## 2025-06-23 RX ORDER — INSULIN LISPRO 100 [IU]/ML
2-7 INJECTION, SOLUTION INTRAVENOUS; SUBCUTANEOUS
Status: DISCONTINUED | OUTPATIENT
Start: 2025-06-23 | End: 2025-06-24 | Stop reason: HOSPADM

## 2025-06-23 RX ORDER — PANTOPRAZOLE SODIUM 40 MG/1
40 TABLET, DELAYED RELEASE ORAL
Status: DISCONTINUED | OUTPATIENT
Start: 2025-06-24 | End: 2025-06-24 | Stop reason: HOSPADM

## 2025-06-23 RX ORDER — SODIUM CHLORIDE 9 MG/ML
40 INJECTION, SOLUTION INTRAVENOUS AS NEEDED
Status: DISCONTINUED | OUTPATIENT
Start: 2025-06-23 | End: 2025-06-24 | Stop reason: HOSPADM

## 2025-06-23 RX ORDER — AMLODIPINE BESYLATE 10 MG/1
10 TABLET ORAL DAILY
Status: DISCONTINUED | OUTPATIENT
Start: 2025-06-24 | End: 2025-06-24 | Stop reason: HOSPADM

## 2025-06-23 RX ORDER — HYDROCHLOROTHIAZIDE 25 MG/1
25 TABLET ORAL
Status: DISCONTINUED | OUTPATIENT
Start: 2025-06-23 | End: 2025-06-24 | Stop reason: HOSPADM

## 2025-06-23 RX ORDER — CETIRIZINE HYDROCHLORIDE 10 MG/1
10 TABLET ORAL DAILY
COMMUNITY

## 2025-06-23 RX ORDER — DEXAMETHASONE SODIUM PHOSPHATE 10 MG/ML
10 INJECTION, SOLUTION INTRA-ARTICULAR; INTRALESIONAL; INTRAMUSCULAR; INTRAVENOUS; SOFT TISSUE ONCE
Status: COMPLETED | OUTPATIENT
Start: 2025-06-23 | End: 2025-06-23

## 2025-06-23 RX ORDER — ALBUTEROL SULFATE 0.83 MG/ML
2.5 SOLUTION RESPIRATORY (INHALATION) EVERY 6 HOURS PRN
Status: DISCONTINUED | OUTPATIENT
Start: 2025-06-23 | End: 2025-06-24 | Stop reason: HOSPADM

## 2025-06-23 RX ORDER — SODIUM CHLORIDE 0.9 % (FLUSH) 0.9 %
10 SYRINGE (ML) INJECTION EVERY 12 HOURS SCHEDULED
Status: DISCONTINUED | OUTPATIENT
Start: 2025-06-23 | End: 2025-06-24 | Stop reason: HOSPADM

## 2025-06-23 RX ORDER — IBUPROFEN 600 MG/1
1 TABLET ORAL
Status: DISCONTINUED | OUTPATIENT
Start: 2025-06-23 | End: 2025-06-24 | Stop reason: HOSPADM

## 2025-06-23 RX ORDER — METHOCARBAMOL 750 MG/1
750 TABLET, FILM COATED ORAL 4 TIMES DAILY
Status: DISCONTINUED | OUTPATIENT
Start: 2025-06-23 | End: 2025-06-24 | Stop reason: HOSPADM

## 2025-06-23 RX ORDER — DEXAMETHASONE SODIUM PHOSPHATE 4 MG/ML
4 INJECTION, SOLUTION INTRA-ARTICULAR; INTRALESIONAL; INTRAMUSCULAR; INTRAVENOUS; SOFT TISSUE EVERY 6 HOURS
Status: DISCONTINUED | OUTPATIENT
Start: 2025-06-23 | End: 2025-06-24

## 2025-06-23 RX ORDER — ASPIRIN 81 MG/1
81 TABLET ORAL DAILY
Status: DISCONTINUED | OUTPATIENT
Start: 2025-06-24 | End: 2025-06-24 | Stop reason: HOSPADM

## 2025-06-23 RX ORDER — NITROGLYCERIN 0.4 MG/1
0.4 TABLET SUBLINGUAL
Status: DISCONTINUED | OUTPATIENT
Start: 2025-06-23 | End: 2025-06-24 | Stop reason: HOSPADM

## 2025-06-23 RX ORDER — ROPINIROLE 0.5 MG/1
1 TABLET, FILM COATED ORAL NIGHTLY
Status: DISCONTINUED | OUTPATIENT
Start: 2025-06-23 | End: 2025-06-24 | Stop reason: HOSPADM

## 2025-06-23 RX ADMIN — Medication 10 ML: at 21:21

## 2025-06-23 RX ADMIN — METHOCARBAMOL TABLETS 750 MG: 750 TABLET, COATED ORAL at 21:19

## 2025-06-23 RX ADMIN — ROPINIROLE HYDROCHLORIDE 1 MG: 0.5 TABLET, FILM COATED ORAL at 21:19

## 2025-06-23 RX ADMIN — DEXAMETHASONE SODIUM PHOSPHATE 10 MG: 10 INJECTION INTRAMUSCULAR; INTRAVENOUS at 16:55

## 2025-06-23 RX ADMIN — INSULIN LISPRO 3 UNITS: 100 INJECTION, SOLUTION INTRAVENOUS; SUBCUTANEOUS at 21:20

## 2025-06-23 RX ADMIN — LIDOCAINE 1 PATCH: 4 PATCH TOPICAL at 16:54

## 2025-06-23 RX ADMIN — GADOBENATE DIMEGLUMINE 20 ML: 529 INJECTION, SOLUTION INTRAVENOUS at 20:26

## 2025-06-23 RX ADMIN — DEXAMETHASONE SODIUM PHOSPHATE 4 MG: 4 INJECTION, SOLUTION INTRAMUSCULAR; INTRAVENOUS at 22:44

## 2025-06-23 NOTE — ED PROVIDER NOTES
EMERGENCY DEPARTMENT ENCOUNTER      Room Number:  03/03  PCP: Dimas Alexander APRN  Independent Historians: Historian: Patient  Patient Care Team:  Dimas Alexander APRN as PCP - General (Nurse Practitioner)  Stephanie Vidal MD (Dermatology)       HPI:  Chief Complaint: Left arm pain and numbness    A complete HPI/ROS/PMH/PSH/SH/FH are unobtainable due to: EM_Unobtainable History : None    Chronic or social conditions impacting patient care (Social Determinants of Health): None      Context: Bob Escamilla is a 69 y.o. male with a PMH significant for diabetes, hypertension, restless leg syndrome, anxiety, chronic back pain who presents to the ED c/o acute left arm pain and numbness as well as facial paresthesias.  The patient reports that he has a laborious job with strenuous heavy lifting and has been dealing with some increased neck discomfort for several months.  He was speaking to his boss today about increased left sided neck pain and radiation of pain into the left shoulder and arm with associated tingling and numbness occasionally.  He was encouraged to go to an urgent care center for evaluation at this time.  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  He started to develop at that time some left-sided facial paresthesias involving the left cheek and forehead and scalp.  It was at this time that he was encouraged to come here for further evaluation.  Upon arrival his paresthesias on the face have extended to the right side involving the cheek as well.  No dysarthria or aphasia.  No headache, vision changes, photophobia.      Upon review of prior external notes (non-ED) -and- Review of prior external test results outside of this encounter it appears the patient was evaluated in the office with family medicine for hypertension on 3/11/2025.  The patient had a normal  CBC on 10/2/2024 and a normal C. difficile on 2/29/2024.      PAST MEDICAL HISTORY  Active Ambulatory Problems     Diagnosis Date Noted    Type 2 diabetes mellitus without complication, with long-term current use of insulin 07/08/2018    Primary hypertension 09/18/2023    Gastroesophageal reflux disease without esophagitis 09/18/2023    Environmental and seasonal allergies 09/18/2023    RLS (restless legs syndrome) 09/18/2023    Elevated liver enzymes 09/18/2023    Anemia 09/19/2023    Hyperlipidemia 09/19/2023    History of laparoscopic cholecystectomy 09/19/2023    Bilateral leg edema 09/19/2023    Chronic diarrhea 01/26/2024    Chronic pain of both hips 04/02/2024    History of right hip replacement 04/02/2024    Anxiety disorder 04/02/2024    Chronic midline low back pain with sciatica 04/02/2024    Hypertriglyceridemia 04/03/2024    Ankle edema, bilateral 10/02/2024    Mild intermittent asthma 01/10/2025    Bradycardia 02/18/2025     Resolved Ambulatory Problems     Diagnosis Date Noted    Calculus of gallbladder without cholecystitis without obstruction 08/30/2022    Need for hepatitis C screening test 09/19/2023    Prostate cancer screening 09/19/2023    Special screening examination for neoplasm of prostate 09/19/2023    Essential hypertension 09/19/2023    Medicare annual wellness visit, subsequent 09/19/2023    Hx of cholecystectomy 01/26/2024    Prostate cancer screening 10/02/2024    Medicare annual wellness visit, subsequent 10/02/2024    Lower respiratory infection 01/10/2025     Past Medical History:   Diagnosis Date    Arthritis     Asthma     Diabetes mellitus     GERD (gastroesophageal reflux disease)     Hypertension     Inguinal hernia of left side without obstruction or gangrene     Sleep apnea          PAST SURGICAL HISTORY  Past Surgical History:   Procedure Laterality Date    CATARACT EXTRACTION WITH INTRAOCULAR LENS IMPLANT Bilateral 01/2017    CHOLECYSTECTOMY N/A 08/30/2022    Procedure:  CHOLECYSTECTOMY LAPAROSCOPIC WITH CHOLIANGIOGRAM;  Surgeon: Darin Juan MD;  Location: [facility] OR;  Service: General;  Laterality: N/A;    COLONOSCOPY  10/07/2015    ENDOSCOPY N/A 2017    INGUINAL HERNIA REPAIR Left     REPLACEMENT TOTAL HIP LATERAL POSITION      right          FAMILY HISTORY  Family History   Problem Relation Age of Onset    Dementia Mother     Heart disease Father     Dementia Father     No Known Problems Sister          SOCIAL HISTORY  Social History     Socioeconomic History    Marital status:    Tobacco Use    Smoking status: Never    Smokeless tobacco: Never   Vaping Use    Vaping status: Never Used   Substance and Sexual Activity    Alcohol use: No    Drug use: No    Sexual activity: Defer         ALLERGIES  Benzamide derivatives, Formaldehyde, Benzonatate, Corticosteroids, Cortisone, and Fenugreek (trigonella foenum-graecum)      REVIEW OF SYSTEMS  Included in HPI  All systems reviewed and negative except for those discussed in HPI.      PHYSICAL EXAM    I have reviewed the triage vital signs and nursing notes.    ED Triage Vitals [06/23/25 1602]   Temp Heart Rate Resp BP SpO2   -- 72 -- -- 92 %      Temp src Heart Rate Source Patient Position BP Location FiO2 (%)   -- -- -- -- --       Physical Exam  Constitutional:       General: He is not in acute distress.     Appearance: He is well-developed.   HENT:      Head: Normocephalic and atraumatic.   Eyes:      General: No scleral icterus.     Extraocular Movements: Extraocular movements intact.      Conjunctiva/sclera: Conjunctivae normal.   Neck:      Trachea: No tracheal deviation.     Cardiovascular:      Rate and Rhythm: Normal rate and regular rhythm.   Pulmonary:      Effort: Pulmonary effort is normal.      Breath sounds: Normal breath sounds.   Abdominal:      Palpations: Abdomen is soft.      Tenderness: There is no abdominal tenderness. There is no guarding.   Musculoskeletal:         General: No deformity.       Cervical back: Normal range of motion.   Lymphadenopathy:      Cervical: No cervical adenopathy.   Skin:     General: Skin is warm and dry.   Neurological:      Mental Status: He is alert and oriented to person, place, and time.      Sensory: Sensation is intact.      Motor: Motor function is intact.      Comments: Some diminished  strength bilaterally related to what the patient describes as arthritic hands.  This is at baseline with no new deficits in regards to weakness.  Cranial nerves II-XII are intact.  Sensation is intact and symmetric throughout, no deficit.  Motor function is 5/5 and symmetric in the extremities x 4.  There is no facial droop, aphasia, dysarthria, speech is clear and coherent.  Normal FTN.   Psychiatric:         Behavior: Behavior normal.         Vital signs and nursing notes reviewed.      PPE: I wore a surgical mask throughout my encounters with the pt. I performed hand hygiene on entry into the pt room and upon exit.     LAB RESULTS  Recent Results (from the past 24 hours)   POC Glucose Once    Collection Time: 06/23/25  3:11 PM    Specimen: Blood   Result Value Ref Range    Glucose 136 (A) 70 - 130 mg/dL   Comprehensive Metabolic Panel    Collection Time: 06/23/25  4:37 PM    Specimen: Blood   Result Value Ref Range    Glucose 109 (H) 65 - 99 mg/dL    BUN 19.0 8.0 - 23.0 mg/dL    Creatinine 1.03 0.76 - 1.27 mg/dL    Sodium 136 136 - 145 mmol/L    Potassium 4.2 3.5 - 5.2 mmol/L    Chloride 101 98 - 107 mmol/L    CO2 22.4 22.0 - 29.0 mmol/L    Calcium 9.2 8.6 - 10.5 mg/dL    Total Protein 7.2 6.0 - 8.5 g/dL    Albumin 4.1 3.5 - 5.2 g/dL    ALT (SGPT) 24 1 - 41 U/L    AST (SGOT) 23 1 - 40 U/L    Alkaline Phosphatase 70 39 - 117 U/L    Total Bilirubin 0.2 0.0 - 1.2 mg/dL    Globulin 3.1 gm/dL    A/G Ratio 1.3 g/dL    BUN/Creatinine Ratio 18.4 7.0 - 25.0    Anion Gap 12.6 5.0 - 15.0 mmol/L    eGFR 79.6 >60.0 mL/min/1.73   Protime-INR    Collection Time: 06/23/25  4:37 PM    Specimen:  Blood   Result Value Ref Range    Protime 13.4 11.7 - 14.2 Seconds    INR 1.03 0.90 - 1.10   aPTT    Collection Time: 06/23/25  4:37 PM    Specimen: Blood   Result Value Ref Range    PTT 26.6 22.7 - 35.4 seconds   CBC Auto Differential    Collection Time: 06/23/25  4:37 PM    Specimen: Blood   Result Value Ref Range    WBC 9.17 3.40 - 10.80 10*3/mm3    RBC 4.37 4.14 - 5.80 10*6/mm3    Hemoglobin 14.3 13.0 - 17.7 g/dL    Hematocrit 42.2 37.5 - 51.0 %    MCV 96.6 79.0 - 97.0 fL    MCH 32.7 26.6 - 33.0 pg    MCHC 33.9 31.5 - 35.7 g/dL    RDW 12.8 12.3 - 15.4 %    RDW-SD 45.0 37.0 - 54.0 fl    MPV 9.8 6.0 - 12.0 fL    Platelets 272 140 - 450 10*3/mm3    Neutrophil % 63.0 42.7 - 76.0 %    Lymphocyte % 25.7 19.6 - 45.3 %    Monocyte % 8.5 5.0 - 12.0 %    Eosinophil % 1.7 0.3 - 6.2 %    Basophil % 0.8 0.0 - 1.5 %    Immature Grans % 0.3 0.0 - 0.5 %    Neutrophils, Absolute 5.77 1.70 - 7.00 10*3/mm3    Lymphocytes, Absolute 2.36 0.70 - 3.10 10*3/mm3    Monocytes, Absolute 0.78 0.10 - 0.90 10*3/mm3    Eosinophils, Absolute 0.16 0.00 - 0.40 10*3/mm3    Basophils, Absolute 0.07 0.00 - 0.20 10*3/mm3    Immature Grans, Absolute 0.03 0.00 - 0.05 10*3/mm3    nRBC 0.0 0.0 - 0.2 /100 WBC   ECG 12 Lead Stroke Evaluation    Collection Time: 06/23/25  4:47 PM   Result Value Ref Range    QT Interval 397 ms    QTC Interval 389 ms         RADIOLOGY  No Radiology Exams Resulted Within Past 24 Hours      MEDICATIONS GIVEN IN ER  Medications   sodium chloride 0.9 % flush 10 mL (has no administration in time range)   Lidocaine 4 % 1 patch (1 patch Transdermal Medication Applied 6/23/25 1654)   morphine injection 4 mg (4 mg Intravenous Not Given 6/23/25 1654)   dexAMETHasone (DECADRON) injection 10 mg (10 mg Intravenous Given 6/23/25 1655)         ORDERS PLACED DURING THIS VISIT:  Orders Placed This Encounter   Procedures    MRI Brain With & Without Contrast    MRI Cervical Spine With & Without Contrast    XR Chest 1 View    Comprehensive  Metabolic Panel    Protime-INR    aPTT    CBC Auto Differential    ECG 12 Lead Stroke Evaluation    Insert Peripheral IV    CBC & Differential         OUTPATIENT MEDICATION MANAGEMENT:  Current Facility-Administered Medications Ordered in Epic   Medication Dose Route Frequency Provider Last Rate Last Admin    Lidocaine 4 % 1 patch  1 patch Transdermal Once Jonathan Hobbs MD   1 patch at 06/23/25 1654    morphine injection 4 mg  4 mg Intravenous Once Jonathan Hobbs MD        sodium chloride 0.9 % flush 10 mL  10 mL Intravenous PRN Jonathan Hobbs MD         Current Outpatient Medications Ordered in Epic   Medication Sig Dispense Refill    acetaminophen (TYLENOL) 650 MG 8 hr tablet Take 1 tablet by mouth Every 8 (Eight) Hours As Needed for Mild Pain. 100 tablet 1    albuterol sulfate  (90 Base) MCG/ACT inhaler Inhale 2 puffs Every 4 (Four) Hours As Needed for Wheezing or Shortness of Air. 6.7 g 3    aluminum-magnesium hydroxide-simethicone (MAALOX/MYLANTA) 200-200-20 MG/5ML suspension Take 15 mL by mouth As Needed for Indigestion or Heartburn.      amLODIPine (NORVASC) 10 MG tablet Take 1 tablet by mouth Daily.      aspirin 81 MG EC tablet Take 1 tablet by mouth Daily. 90 tablet 3    B Complex Vitamins (VITAMIN-B COMPLEX PO) Take  by mouth.      celecoxib (CeleBREX) 200 MG capsule TAKE ONE CAPSULE BY MOUTH DAILY WITH FOOD 90 capsule 0    Cholecalciferol 50 MCG (2000 UT) tablet Take 1 tablet by mouth Daily. 30 each 5    cyclobenzaprine (FLEXERIL) 5 MG tablet Take 1 tablet by mouth 2 (Two) Times a Day As Needed for Muscle Spasms. 30 tablet 0    diphenhydrAMINE (BENADRYL) 25 MG tablet Take  by mouth.      Ferrous Gluconate (IRON 27 PO) Take 1 tablet by mouth Daily.      ferrous sulfate 325 (65 FE) MG EC tablet Take 1 tablet by mouth Daily.      fexofenadine (ALLEGRA) 180 MG tablet Take 1 tablet by mouth Daily. 90 tablet 1    glimepiride (Amaryl) 2 MG tablet Take 1 tablet by mouth Every Morning  Before Breakfast. 180 tablet 1    Glucosamine 500 MG capsule Take  by mouth.      Glucosamine-Chondroitin (OSTEO BI-FLEX REGULAR STRENGTH PO) Take 1 tablet by mouth 2 (Two) Times a Day.      glucose blood test strip DX E11.9 check bs  daily accu check guide strips 100 each 5    guaiFENesin (MUCINEX) 600 MG 12 hr tablet Take 2 tablets by mouth 2 (Two) Times a Day. 30 tablet 0    hydrOXYzine (ATARAX) 25 MG tablet TAKE TWO TABLETS BY MOUTH AT NIGHT AS NEEDED FOR ITCHING, ALLERGIES OR ANXIETY (Patient taking differently: Take 1 tablet by mouth.) 60 tablet 1    Lancets misc DX E11.9 Check bs once daily 100 each 1    methylPREDNISolone (MEDROL) 4 MG dose pack Take as directed on package instructions. 21 tablet 0    Misc Natural Products (PROSTATE HEALTH PO) Take  by mouth.      montelukast (Singulair) 10 MG tablet Take 1 tablet by mouth Every Night. 90 tablet 1    multivitamin with minerals (Multivitamin Adult) tablet tablet Take 1 tablet by mouth Daily for 360 days. 90 tablet 3    mupirocin (BACTROBAN) 2 % ointment       NON FORMULARY Take 1 tablet by mouth 2 (Two) Times a Day. Focus Factor      omega-3 acid ethyl esters (LOVAZA) 1 g capsule Take 1 capsule by mouth 2 (Two) Times a Day. 180 capsule 3    oxymetazoline (AFRIN) 0.05 % nasal spray Administer 2 sprays into the nostril(s) as directed by provider Every Night.      pantoprazole (PROTONIX) 40 MG EC tablet Take 1 tablet by mouth Daily. 90 tablet 3    pioglitazone (ACTOS) 30 MG tablet TAKE 1 TABLET BY MOUTH DAILY 90 tablet 1    rOPINIRole (REQUIP) 1 MG tablet Take 1 tablet by mouth Every Night. 90 tablet 1    saccharomyces boulardii (Florastor) 250 MG capsule Take 1 capsule by mouth 2 (Two) Times a Day. 90 capsule 2    terbinafine (lamiSIL) 250 MG tablet Take 1 tablet by mouth.      valsartan-hydrochlorothiazide (DIOVAN-HCT) 320-25 MG per tablet Take 1 tablet by mouth Daily. 90 tablet 3           PROGRESS, DATA ANALYSIS, CONSULTS, AND MEDICAL DECISION MAKING  All  labs have been independently interpreted by me.  All radiology studies have been reviewed by me. All EKG's have been independently viewed and interpreted by me.  Discussion below represents my analysis of pertinent findings related to patient's condition, differential diagnosis, treatment plan and final disposition.    Patient presentation and evaluation most consistent with cervical radiculopathy with facial paresthesias requiring observation admission for neurology consultation and MRIs.  Patient agreeable with all questions answered.    DIFFERENTIAL DIAGNOSIS INCLUDE BUT NOT LIMITED TO:     TIA, stroke, cervical radiculopathy    Clinical Scores: N/A      ED Course as of 06/23/25 1718   Mon Jun 23, 2025 1629 I discussed the case with MD Darlene with stroke neurology at this time regarding the patient.  I discussed work-up, results, concerns.  I discussed the consulting provider's desire for admitting the patient to the observation unit and obtaining MRI brain with and without contrast as well as MRI cervical spine with and without contrast to further evaluate.  No concern for acute stroke at this time.   [DC]   1632 Patient presents with left-sided arm paresthesias and tingling and numbness that I feel are likely related to a cervical radicular source.  His symptoms are highly atypical for stroke but with facial paresthesias that I cannot explain with cervical radicular etiology, I plan to admit to the observation unit for further evaluation with MRIs and neurology evaluation.  Patient agreeable with all questions answered.  No concern for acute stroke.  The patient's symptoms are mild and on exam he has sensation intact throughout the face and extremities and no weakness or aphasia or dysarthria. [DC]   1717 I discussed the case with LETITIA Noriega with [facility] at this time regarding the patient.  I discussed work-up, results, concerns.  I discussed the consulting provider's desire for obs admit.    [DC]      ED  Course User Index  [DC] Stefano Salmeron PA         4384 I rechecked the patient.  I discussed the patient's labs, radiology findings (including all incidental findings), diagnosis, and plan for admission. The patient understands and agrees with the plan.      AS OF 17:18 EDT VITALS:    BP - 157/61  HR - 62  TEMP - 98 °F (36.7 °C) (Oral)  O2 SATS - 96%    COMPLEXITY OF CARE  The patient requires admission.      DIAGNOSIS  Final diagnoses:   Acute cervical radiculopathy   Facial paresthesia         DISPOSITION  ED Disposition       ED Disposition   Decision to Admit    Condition   --    Comment   --                Please note that portions of this document were completed with a voice recognition program.    Note Disclaimer: At [facility], we believe that sharing information builds trust and better relationships. You are receiving this note because you recently visited [facility]. It is possible you will see health information before a provider has talked with you about it. This kind of information can be easy to misunderstand. To help you fully understand what it means for your health, we urge you to discuss this note with your provider.         Stefano Salmeron PA  06/23/25 [id number]

## 2025-06-23 NOTE — ED PROVIDER NOTES
MD ATTESTATION NOTE    The LETITIA and I have discussed this patient's history, physical exam, and treatment plan.  I have reviewed the documentation and personally had a face to face interaction with the patient. I affirm the documentation and agree with the treatment and plan.  The attached note describes my personal findings.      I provided a substantive portion of the care of the patient.  I personally performed the physical exam in its entirety, and below are my findings.       Brief HPI: This patient is a 69-year-old male with a history of hypertension and diabetes presenting to the emergency room today with reported left-sided neck and arm discomfort made worse by movements.  He also complains of some left-sided facial numbness that is now currently spreading over into the right side of his cheek.  He denies unilateral weakness, speech abnormalities, chest pain, back pain, or shortness of breath.      PHYSICAL EXAM  ED Triage Vitals   Temp Heart Rate Resp BP SpO2   06/23/25 1613 06/23/25 1602 06/23/25 1613 06/23/25 1630 06/23/25 1602   98 °F (36.7 °C) 72 14 157/61 92 %      Temp src Heart Rate Source Patient Position BP Location FiO2 (%)   06/23/25 1613 06/23/25 1700 06/23/25 1700 06/23/25 1700 --   Oral Monitor Sitting Left arm          GENERAL: Resting comfortably and in no acute distress, nontoxic in appearance  HENT: nares patent  EYES: no scleral icterus  CV: regular rhythm, normal rate, no M/R/G  RESPIRATORY: normal effort, lungs clear bilaterally  ABDOMEN: soft, nontender, no rebound or guarding  MUSCULOSKELETAL: no deformity, no edema, mild tenderness along his left trapezius region with upper extremity discomfort with movement.  NEURO: alert, moves all extremities, follows commands, NIH 0  PSYCH:  calm, cooperative  SKIN: warm, dry    Vital signs and nursing notes reviewed.      Differential diagnosis includes but is not limited to cervical radiculopathy, acute CVA, TIA, trapezius spasm, or electrolyte  disturbance.      Plan: We will discuss the case with neurology and obtain labs in the emergency room as well as likely MRI imaging as well for further neurologic testing.  Further plan to follow.      Lab values independently interpreted by myself with my interpretation showing a normal CBC and CMP.       Jonathan Hobbs MD  06/23/25 [id number]

## 2025-06-23 NOTE — ED NOTES
"Nursing report ED to floor  Bob Escamilla  69 y.o.  male    HPI :  HPI  Stated Reason for Visit: patient to ED via EMS from urgent care for left sided facial numbness and left arm weakness, LKN 1500. no facial saritha, no slurred speech, patient alert and oriented x4, EMS reports patient was ambulatory on seen, patient reports some lightheadedness  History Obtained From: patient    Chief Complaint  Chief Complaint   Patient presents with    Numbness       Admitting doctor:   Bernard Newberry MD    Admitting diagnosis:   The primary encounter diagnosis was Acute cervical radiculopathy. A diagnosis of Facial paresthesia was also pertinent to this visit.    Code status:   Current Code Status       Date Active Code Status Order ID Comments User Context       Not on file            Allergies:   Benzamide derivatives, Formaldehyde, Benzonatate, Corticosteroids, Cortisone, and Fenugreek (trigonella foenum-graecum)    Isolation:   No active isolations    Intake and Output  No intake or output data in the 24 hours ending 06/23/25 1726    Weight:       06/23/25  1613   Weight: 112 kg (248 lb)       Most recent vitals:   Vitals:    06/23/25 1609 06/23/25 1612 06/23/25 1613 06/23/25 1630   BP:    157/61   Pulse: 62 61  62   Resp:   14    Temp:   98 °F (36.7 °C)    TempSrc:   Oral    SpO2: 98% 97%  96%   Weight:   112 kg (248 lb)    Height:   167.6 cm (66\")        Active LDAs/IV Access:   Lines, Drains & Airways       Active LDAs       Name Placement date Placement time Site Days    Peripheral IV 06/23/25 1604 20 G Left Antecubital 06/23/25  1604  Antecubital  less than 1                    Labs (abnormal labs have a star):   Labs Reviewed   COMPREHENSIVE METABOLIC PANEL - Abnormal; Notable for the following components:       Result Value    Glucose 109 (*)     All other components within normal limits    Narrative:     GFR Categories in Chronic Kidney Disease (CKD)              GFR Category          GFR (mL/min/1.73)    " Interpretation  G1                    90 or greater        Normal or high (1)  G2                    60-89                Mild decrease (1)  G3a                   45-59                Mild to moderate decrease  G3b                   30-44                Moderate to severe decrease  G4                    15-29                Severe decrease  G5                    14 or less           Kidney failure    (1)In the absence of evidence of kidney disease, neither GFR category G1 or G2 fulfill the criteria for CKD.    eGFR calculation 2021 CKD-EPI creatinine equation, which does not include race as a factor   PROTIME-INR - Normal   APTT - Normal   CBC WITH AUTO DIFFERENTIAL - Normal   CBC AND DIFFERENTIAL    Narrative:     The following orders were created for panel order CBC & Differential.  Procedure                               Abnormality         Status                     ---------                               -----------         ------                     CBC Auto Differential[[id number]]        Normal              Final result                 Please view results for these tests on the individual orders.       EKG:   ECG 12 Lead Stroke Evaluation   Preliminary Result   HEART RATE=57  bpm   RR Uvjikafr=4542  ms   ND Xonxoevz=265  ms   P Horizontal Axis=7  deg   P Front Axis=20  deg   QRSD Interval=78  ms   QT Jpgdnxul=530  ms   GUtA=663  ms   QRS Axis=-9  deg   T Wave Axis=1  deg   - ABNORMAL ECG -   Sinus rhythm   Abnormal R-wave progression, early transition   Inferior infarct, old   Date and Time of Study:2025-06-23 16:47:21          Meds given in ED:   Medications   sodium chloride 0.9 % flush 10 mL (has no administration in time range)   Lidocaine 4 % 1 patch (1 patch Transdermal Medication Applied 6/23/25 1654)   morphine injection 4 mg (4 mg Intravenous Not Given 6/23/25 1654)   sodium chloride 0.9 % flush 10 mL (has no administration in time range)   sodium chloride 0.9 % flush 10 mL (has no administration in  time range)   sodium chloride 0.9 % infusion 40 mL (has no administration in time range)   nitroglycerin (NITROSTAT) SL tablet 0.4 mg (has no administration in time range)   Potassium Replacement - Follow Nurse / BPA Driven Protocol (has no administration in time range)   Magnesium Standard Dose Replacement - Follow Nurse / BPA Driven Protocol (has no administration in time range)   Phosphorus Replacement - Follow Nurse / BPA Driven Protocol (has no administration in time range)   Calcium Replacement - Follow Nurse / BPA Driven Protocol (has no administration in time range)   acetaminophen (TYLENOL) tablet 650 mg (has no administration in time range)   dexAMETHasone (DECADRON) injection 10 mg (10 mg Intravenous Given 6/23/25 3791)       Imaging results:  No radiology results for the last day    Ambulatory status:   - independent    Social issues:   Social History     Socioeconomic History    Marital status:    Tobacco Use    Smoking status: Never    Smokeless tobacco: Never   Vaping Use    Vaping status: Never Used   Substance and Sexual Activity    Alcohol use: No    Drug use: No    Sexual activity: Defer       Peripheral Neurovascular  Peripheral Neurovascular (Adult)  Peripheral Neurovascular WDL: .WDL except, pulse assessment, neurovascular assessment upper  Pulse Assessment: dorsalis pedis, radial  LUE Neurovascular Assessment  Temperature LUE: warm  Color LUE: no discoloration  Sensation LUE: (S) numbness present  RUE Neurovascular Assessment  Temperature RUE: warm  Color RUE: no discoloration    Neuro Cognitive  Neuro Cognitive (Adult)  Cognitive/Neuro/Behavioral WDL: .WDL except, orientation, speech, all, mood/behavior  Orientation: oriented x 4  Speech: clear  Mood/Behavior: calm, cooperative  Additional Documentation: [place] Coma Scale (Group), Pupils (Group), Hand /Ankle Strength (Group), Dizziness Assessment (Group)  Pupils  Pupil PERRLA: yes  Hand /Ankle Strength  Hand , Left:  moderate  Hand , Right: moderate  Dorsiflexion, Left: moderate  Dorsiflexion, Right: moderate  Plantarflexion, Left: moderate  Plantarflexion, Right: moderate  [place] Coma Scale  Best Eye Response: 4-->(E4) spontaneous  Best Motor Response: 6-->(M6) obeys commands  Best Verbal Response: 5-->(V5) oriented  [place] Coma Scale Score: 15  NIH Stroke Scale  Interval: baseline  1a. Level of Consciousness: 0-->Alert, keenly responsive  1b. LOC Questions: 0-->Answers both questions correctly  1c. LOC Commands: 0-->Performs both tasks correctly  2. Best Gaze: 0-->Normal  3. Visual: 0-->No visual loss  4. Facial Palsy: 0-->Normal symmetrical movements  5a. Motor Arm, Left: 0-->No drift, limb holds 90 (or 45) degrees for full 10 secs  5b. Motor Arm, Right: 0-->No drift, limb holds 90 (or 45) degrees for full 10 secs  6a. Motor Leg, Left: 0-->No drift, leg holds 30 degree position for full 5 secs  6b. Motor Leg, Right: 0-->No drift, leg holds 30 degree position for full 5 secs  7. Limb Ataxia: 0-->Absent  8. Sensory: 1-->Mild-to-moderate sensory loss, patient feels pinprick is less sharp or is dull on the affected side, or there is a loss of superficial pain with pinprick, but patient is aware of being touched  9. Best Language: 0-->No aphasia, normal  10. Dysarthria: 0-->Normal  11. Extinction and Inattention (formerly Neglect): 0-->No abnormality  Total (NIH Stroke Scale): 1  Dizziness Assessment  Dizziness Reported Symptoms: (S) intermittent  Dizziness Occurs With: (S) standing    Learning  Learning Assessment  Learning Readiness and Ability: no barriers identified  Education Provided  Person Taught: patient, spouse    Respiratory  Respiratory  Airway WDL: WDL  Respiratory WDL  Respiratory WDL: WDL, all  Rhythm/Pattern, Respiratory: no shortness of breath reported, depth regular, pattern regular, unlabored    Abdominal Pain  Safety Interventions  Safety Precautions/Falls Reduction: family at bedside    Pain  Assessments  Pain (Adult)  (0-10) Pain Rating: Rest: 4  Pain Location: neck  Pain Side/Orientation: left    NIH Stroke Scale  NIH Stroke Scale  Interval: baseline  1a. Level of Consciousness: 0-->Alert, keenly responsive  1b. LOC Questions: 0-->Answers both questions correctly  1c. LOC Commands: 0-->Performs both tasks correctly  2. Best Gaze: 0-->Normal  3. Visual: 0-->No visual loss  4. Facial Palsy: 0-->Normal symmetrical movements  5a. Motor Arm, Left: 0-->No drift, limb holds 90 (or 45) degrees for full 10 secs  5b. Motor Arm, Right: 0-->No drift, limb holds 90 (or 45) degrees for full 10 secs  6a. Motor Leg, Left: 0-->No drift, leg holds 30 degree position for full 5 secs  6b. Motor Leg, Right: 0-->No drift, leg holds 30 degree position for full 5 secs  7. Limb Ataxia: 0-->Absent  8. Sensory: 1-->Mild-to-moderate sensory loss, patient feels pinprick is less sharp or is dull on the affected side, or there is a loss of superficial pain with pinprick, but patient is aware of being touched  9. Best Language: 0-->No aphasia, normal  10. Dysarthria: 0-->Normal  11. Extinction and Inattention (formerly Neglect): 0-->No abnormality  Total (NIH Stroke Scale): 1    Josue Waterman RN  06/23/25 17:26 EDT

## 2025-06-23 NOTE — PROGRESS NOTES
Patient Care Team:  Dimas Alexander APRN as PCP - General (Nurse Practitioner)  Stephanie Vidal MD (Dermatology)     BALJIT CALDERON H&P Note    I supervised care provided by the midlevel provider. We have discussed this patient's history, physical exam, and treatment plan. I have reviewed the midlevel provider's note and I agree with the midlevel provider's findings and plan of care.   SHARED VISIT: This visit was performed by BOTH a physician and an APC. The substantive portion of the medical decision making was performed by this attesting physician who made or approved the management plan and takes responsibility for patient management.   I have personally had a face to face encounter with the patient.   My personal findings are documented below:    History:  69-year-old male with past medical history including hypertension diabetes and sleep apnea presents with left arm pain with associated paresthesias of the arm as well as the face.  No facial droop, no difficulty with speech.    Physical Exam:  General: No acute distress.  HENT: NCAT, PERRL, Nares patent.  Eyes: no scleral icterus.  Neck: trachea midline, no ROM limitations.  CV: regular rhythm, regular rate.  Respiratory: normal effort, CTAB.  Abdomen: soft, nondistended, NTTP, no rebound tenderness, no guarding or rigidity.  Musculoskeletal: no deformity.  Neuro: Alert and oriented x3, face symmetric, no facial droop, eyebrows raise equally bilaterally, tongue midline, no dysarthria, no aphasia, extraocular motion intact, no nystagmus, visual acuity grossly normal, cranial nerves II through XII intact, moves all extremities well, 5 out of 5 strength in all extremities, sensation intact light touch all extremities, no ataxia, no tremor.  Skin: warm, dry.    Assessment and Plan:  69-year-old male with past medical history including hypertension diabetes and sleep apnea presents with left arm pain with associated paresthesias of the arm as well  as the face.  EKG nonischemic, chest x-ray unremarkable, ED lab work unremarkable reassuring.  Stroke neurology consulted from the emergency department and recommended MRI brain with and without contrast and MRI cervical spine.  Patient admitted to the observation unit, neurology following, obtaining MRI imaging, multimodal pain control, PT consulted, monitoring, may consider neurosurgery consult depending on MRI cervical spine findings.  Obtain x-ray imaging of bilateral shoulders.  Plan for orthopedic referral as outpatient.

## 2025-06-23 NOTE — H&P
[facility]   HISTORY AND PHYSICAL    Patient Name: Bob Escamilla  : 1955  MRN: [id number]  Primary Care Physician:  Dimas Alexander APRN  Date of admission: 2025    Subjective   Subjective     Chief Complaint:   Chief Complaint   Patient presents with    Numbness     HPI:    Bob Escamilla is a 69 y.o. male with a history of diabetes, OA, asthma, hypertension, ALEJANDRINA compliant with CPAP who presented to the emergency department with neck pain with associated left arm weakness and numbness.  Also endorses facial numbness on the left side.  He reports he chronically has headaches and takes Tylenol daily.  Denies vision change, facial droop, abnormal speech, confusion.  No numbness or weakness in his lower extremities.  He is not a great historian, however per his wife the symptoms have been going on for 3 weeks.  He was seen at an urgent care and prescribed a steroid taper and initially had some improvement of his symptoms but then they returned.     CBC and CMP largely unremarkable.  EKG sinus rhythm, no acute ischemia.  Chest x-ray negative for acute findings.  Findings discussed with stroke neurologist on-call.  He recommends getting MRI brain and cervical spine with and without contrast.  He will be admitted to the ED observation for further management.    Review of Systems   All systems were reviewed and negative except for: Those mentioned in HPI    Personal History     Past Medical History:   Diagnosis Date    Arthritis     Asthma     Calculus of gallbladder without cholecystitis without obstruction 2022    Diabetes mellitus     GERD (gastroesophageal reflux disease)     Hx of cholecystectomy 2024    Hypertension     Inguinal hernia of left side without obstruction or gangrene     Medicare annual wellness visit, subsequent 2023    Sleep apnea        Past Surgical History:   Procedure Laterality Date    CATARACT EXTRACTION WITH INTRAOCULAR LENS IMPLANT Bilateral  01/2017    CHOLECYSTECTOMY N/A 08/30/2022    Procedure: CHOLECYSTECTOMY LAPAROSCOPIC WITH CHOLIANGIOGRAM;  Surgeon: Darin Juan MD;  Location: [facility];  Service: General;  Laterality: N/A;    COLONOSCOPY  10/07/2015    ENDOSCOPY N/A 2017    INGUINAL HERNIA REPAIR Left     REPLACEMENT TOTAL HIP LATERAL POSITION      right        Family History: family history includes Dementia in his father and mother; Heart disease in his father; No Known Problems in his sister. Otherwise pertinent FHx was reviewed and not pertinent to current issue.    Social History:  reports that he has never smoked. He has never used smokeless tobacco. He reports that he does not drink alcohol and does not use drugs.    Home Medications:  B Complex Vitamins, Cholecalciferol, Ferrous Gluconate, Glucosamine, Glucosamine-Chondroitin, Lancets, Misc Natural Products, NON FORMULARY, acetaminophen, albuterol sulfate HFA, aluminum-magnesium hydroxide-simethicone, amLODIPine, aspirin, celecoxib, cyclobenzaprine, diphenhydrAMINE, ferrous sulfate, fexofenadine, glimepiride, glucose blood, guaiFENesin, hydrOXYzine, methylPREDNISolone, montelukast, multivitamin with minerals, mupirocin, omega-3 acid ethyl esters, oxymetazoline, pantoprazole, pioglitazone, rOPINIRole, saccharomyces boulardii, terbinafine, and valsartan-hydrochlorothiazide    Allergies:  Allergies   Allergen Reactions    Benzamide Derivatives     Formaldehyde Itching     Pt is only able to wear cotton clothing    Benzonatate Rash    Corticosteroids Rash     An allergy to hydrocortisone cream, has had no reaction other than from an allergy test performed 20 years ago    Cortisone Rash    Fenugreek (Trigonella Foenum-Graecum) Rash       Objective   Objective     Vitals:   Temp:  [98 °F (36.7 °C)] 98 °F (36.7 °C)  Heart Rate:  [53-76] 53  Resp:  [14-16] 14  BP: (134-157)/() 134/101  Physical Exam    Constitutional: Awake, alert   Eyes: PERRLA, sclerae anicteric, no conjunctival  injection   HENT: NCAT, mucous membranes moist   Neck: Supple, no thyromegaly, no lymphadenopathy, trachea midline   Respiratory: Clear to auscultation bilaterally, nonlabored respirations    Cardiovascular: RRR, no murmurs, rubs, or gallops, palpable pedal pulses bilaterally   Gastrointestinal: Positive bowel sounds, soft, nontender, nondistended   Musculoskeletal: No bilateral ankle edema, no clubbing or cyanosis to extremities   Psychiatric: Appropriate affect, cooperative   Neurologic: Oriented x 3, strength symmetric in all extremities, Cranial Nerves grossly intact to confrontation, speech clear   Skin: No rashes     Result Review    Result Review:  I have personally reviewed the results from the time of this admission to 6/23/2025 18:16 EDT and agree with these findings:  [x]  Laboratory list / accordion  []  Microbiology  [x]  Radiology  [x]  EKG/Telemetry   []  Cardiology/Vascular   []  Pathology  []  Old records  []  Other:  Most notable findings include: Glucose 109    Assessment & Plan   The 10-year ASCVD risk score (Cedric NAVARRO, et al., 2019) is: 30.3%    Values used to calculate the score:      Age: 69 years      Sex: Male      Is Non- : No      Diabetic: Yes      Tobacco smoker: No      Systolic Blood Pressure: 134 mmHg      Is BP treated: Yes      HDL Cholesterol: 37 mg/dL      Total Cholesterol: 137 mg/dL    Assessment / Plan     Brief Patient Summary:  Bob Escamilla is a 69 y.o. male who will be admitted to the ED observation for further evaluation due to left-sided numbness and weakness.  Neurology consulted.  Will get MRI brain and cervical spine with and without contrast.    Active Hospital Problems:  Active Hospital Problems    Diagnosis     **Left sided numbness      Plan:     Left facial numbness  Neck pain with left upper extremity weakness and numbness  Continuous telemetry monitoring  Vital signs and neurochecks per nurse routine  EKG sinus rhythm, no acute  ischemia  Chest x-ray negative for acute infiltrate  Neurology consulted  MRI brain with and without  B12, folate, TSH  Check A1c, lipid panel  Continue low-dose aspirin  Consider neurosurgery consultation pending MRI findings    Diabetes  Hold oral diabetic medications while inpatient  Correction scale ordered    Hypertension  Permissive hypertension for now    VTE Prophylaxis:  Mechanical VTE prophylaxis orders are present.    CODE STATUS:       Admission Status:  I believe this patient meets observation status.    Electronically signed by ANGIE Park, 06/23/25, 6:16 PM EDT.    75 minutes has been spent by [facility] providers in the care of this patient while under observation status on this date 06/23/25    I have worn appropriate PPE during this patient encounter, sanitized my hands both with entering and exiting patient's room.    I have discussed plan of care with patient including advance care plan and/or surrogate decision maker.  Patient advises that their wife will be their primary surrogate decision maker.

## 2025-06-23 NOTE — LETTER
June 24, 2025     Patient: Bob Escamilla   YOB: 1955   Date of Visit: 6/23/2025       To Whom It May Concern:    It is my medical opinion that Bob Escamilla may return to work on 6/28/25.           Sincerely,        Leann Sykes  CC: No Recipients

## 2025-06-24 ENCOUNTER — APPOINTMENT (OUTPATIENT)
Dept: CT IMAGING | Facility: HOSPITAL | Age: 70
End: 2025-06-24
Payer: MEDICARE

## 2025-06-24 ENCOUNTER — READMISSION MANAGEMENT (OUTPATIENT)
Dept: CALL CENTER | Facility: HOSPITAL | Age: 70
End: 2025-06-24
Payer: MEDICARE

## 2025-06-24 VITALS
DIASTOLIC BLOOD PRESSURE: 65 MMHG | TEMPERATURE: 98.1 F | HEART RATE: 53 BPM | WEIGHT: 251.9 LBS | OXYGEN SATURATION: 95 % | BODY MASS INDEX: 40.48 KG/M2 | RESPIRATION RATE: 16 BRPM | SYSTOLIC BLOOD PRESSURE: 143 MMHG | HEIGHT: 66 IN

## 2025-06-24 LAB
CHOLEST SERPL-MCNC: 157 MG/DL (ref 0–200)
CRP SERPL-MCNC: <0.3 MG/DL (ref 0–0.5)
ERYTHROCYTE [SEDIMENTATION RATE] IN BLOOD: 8 MM/HR (ref 0–20)
FOLATE SERPL-MCNC: >20 NG/ML (ref 4.78–24.2)
GLUCOSE BLDC GLUCOMTR-MCNC: 200 MG/DL (ref 70–130)
GLUCOSE BLDC GLUCOMTR-MCNC: 231 MG/DL (ref 70–130)
HBA1C MFR BLD: 7.5 % (ref 4.8–5.6)
HDLC SERPL-MCNC: 42 MG/DL (ref 40–60)
LDLC SERPL CALC-MCNC: 100 MG/DL (ref 0–100)
LDLC/HDLC SERPL: 2.38 {RATIO}
TRIGL SERPL-MCNC: 75 MG/DL (ref 0–150)
VIT B12 BLD-MCNC: 730 PG/ML (ref 211–946)
VLDLC SERPL-MCNC: 15 MG/DL (ref 5–40)

## 2025-06-24 PROCEDURE — 80061 LIPID PANEL: CPT | Performed by: NURSE PRACTITIONER

## 2025-06-24 PROCEDURE — 63710000001 INSULIN LISPRO (HUMAN) PER 5 UNITS: Performed by: NURSE PRACTITIONER

## 2025-06-24 PROCEDURE — 86140 C-REACTIVE PROTEIN: CPT | Performed by: STUDENT IN AN ORGANIZED HEALTH CARE EDUCATION/TRAINING PROGRAM

## 2025-06-24 PROCEDURE — 70491 CT SOFT TISSUE NECK W/DYE: CPT

## 2025-06-24 PROCEDURE — 97110 THERAPEUTIC EXERCISES: CPT

## 2025-06-24 PROCEDURE — 99204 OFFICE O/P NEW MOD 45 MIN: CPT | Performed by: STUDENT IN AN ORGANIZED HEALTH CARE EDUCATION/TRAINING PROGRAM

## 2025-06-24 PROCEDURE — G0378 HOSPITAL OBSERVATION PER HR: HCPCS

## 2025-06-24 PROCEDURE — 25510000001 IOPAMIDOL 61 % SOLUTION: Performed by: EMERGENCY MEDICINE

## 2025-06-24 PROCEDURE — 96376 TX/PRO/DX INJ SAME DRUG ADON: CPT

## 2025-06-24 PROCEDURE — 82607 VITAMIN B-12: CPT | Performed by: NURSE PRACTITIONER

## 2025-06-24 PROCEDURE — 82948 REAGENT STRIP/BLOOD GLUCOSE: CPT

## 2025-06-24 PROCEDURE — 82746 ASSAY OF FOLIC ACID SERUM: CPT | Performed by: NURSE PRACTITIONER

## 2025-06-24 PROCEDURE — 83036 HEMOGLOBIN GLYCOSYLATED A1C: CPT | Performed by: NURSE PRACTITIONER

## 2025-06-24 PROCEDURE — 85652 RBC SED RATE AUTOMATED: CPT | Performed by: STUDENT IN AN ORGANIZED HEALTH CARE EDUCATION/TRAINING PROGRAM

## 2025-06-24 PROCEDURE — 97165 OT EVAL LOW COMPLEX 30 MIN: CPT

## 2025-06-24 PROCEDURE — 25010000002 DEXAMETHASONE PER 1 MG: Performed by: NURSE PRACTITIONER

## 2025-06-24 RX ORDER — LIDOCAINE 4 G/G
1 PATCH TOPICAL
Status: DISCONTINUED | OUTPATIENT
Start: 2025-06-24 | End: 2025-06-24 | Stop reason: HOSPADM

## 2025-06-24 RX ORDER — IOPAMIDOL 612 MG/ML
100 INJECTION, SOLUTION INTRAVASCULAR
Status: COMPLETED | OUTPATIENT
Start: 2025-06-24 | End: 2025-06-24

## 2025-06-24 RX ORDER — LIDOCAINE 4 G/G
1 PATCH TOPICAL
Qty: 10 PATCH | Refills: 0 | Status: SHIPPED | OUTPATIENT
Start: 2025-06-24

## 2025-06-24 RX ORDER — CYCLOBENZAPRINE HCL 5 MG
5 TABLET ORAL 2 TIMES DAILY PRN
Qty: 20 TABLET | Refills: 0 | Status: SHIPPED | OUTPATIENT
Start: 2025-06-24 | End: 2025-06-30

## 2025-06-24 RX ADMIN — METHOCARBAMOL TABLETS 750 MG: 750 TABLET, COATED ORAL at 12:23

## 2025-06-24 RX ADMIN — DEXAMETHASONE SODIUM PHOSPHATE 4 MG: 4 INJECTION, SOLUTION INTRAMUSCULAR; INTRAVENOUS at 04:20

## 2025-06-24 RX ADMIN — Medication 10 ML: at 08:49

## 2025-06-24 RX ADMIN — METHOCARBAMOL TABLETS 750 MG: 750 TABLET, COATED ORAL at 08:48

## 2025-06-24 RX ADMIN — INSULIN LISPRO 3 UNITS: 100 INJECTION, SOLUTION INTRAVENOUS; SUBCUTANEOUS at 12:23

## 2025-06-24 RX ADMIN — ASPIRIN 81 MG: 81 TABLET, COATED ORAL at 08:48

## 2025-06-24 RX ADMIN — INSULIN LISPRO 3 UNITS: 100 INJECTION, SOLUTION INTRAVENOUS; SUBCUTANEOUS at 08:48

## 2025-06-24 RX ADMIN — LIDOCAINE 1 PATCH: 4 PATCH TOPICAL at 14:20

## 2025-06-24 RX ADMIN — IOPAMIDOL 75 ML: 612 INJECTION, SOLUTION INTRAVENOUS at 09:33

## 2025-06-24 NOTE — CONSULTS
"Diabetes Education  Assessment/Teaching    Patient Name:  Bob Escamilla  YOB: 1955  MRN: [id number]  Admit Date:  6/23/2025      Assessment Date:  6/24/2025  Flowsheet Row Most Recent Value   General Information     Referral From: A1c, Database  [A1C 7.5%]   Height 167.6 cm (66\")   Height Method Stated   Weight 114 kg (251 lb 14.4 oz)   Weight Method Stated   How would you rate your current health? good   Patient expressed need manage diabets without insulin()   Pregnancy Assessment    Diabetes History    What type of diabetes do you have? Type 2   Length of Diabetes Diagnosis 6 - 10 years   Current DM knowledge good   Do you test your blood sugar at home? yes   Frequency of checks once a day   Have you had low blood sugar? (<70mg/dl) no   Have you had high blood sugar? (>140mg/dl) yes   How often do you have high blood sugar? rare   How would you rate your diabetes control? fair   Has diabetes caused a problem in your life (work/school/family/friends, etc.)? yes   Describe the problem(s) diabetes has caused in your life pt a ,does not want to take insulin   Education Preferences    What areas of diabetes would you like to learn about? avoiding high blood sugar, medications for diabetes, testing my blood sugar at home   Nutrition Information    Assessment Topics    Healthy Eating - Assessment Needs education   Being Active - Assessment Needs education   Taking Medication - Assessment Needs education   Problem Solving - Assessment Needs education   Monitoring - Assessment Needs education   DM Goals    Being Active - Goal 0-7 days from discharge  [set goal to be more active when possible]   Reducing Risk - Goal 0-30 days from discharge   Healthy Coping - Goal 0-30 days from discharge   Monitoring - Goal 0-30 days from discharge   Contact Plan Outpatient DM education referral, 30-90 days from discharge            Flowsheet Row Most Recent Value   DM Education Needs    Meter Has " own   Meter Type Accuchek   Frequency of Testing Daily   Medication Oral  [actos and amaryl]   Healthy Eating Reviewed meal plan   Physical Activity Frequency Discussed exercise importance   Healthy Coping Appropriate   Discharge Plan Home, Follow-up with MD   Motivation Moderate   Teaching Method Discussion, Explanation, Teach back   Patient Response Verbalized understanding              Other Comments:  Spoke with pt about his diabetes management.He states that he has a BG meter and tests once a day. He is not interested in the CGM devices.Pt states he is a  so does not want to go on insulin. We discussed the impact of steroids and how they raise BG and how often insulin needs to be a tool in short term (depending on time pt is on steroids)    Reviewed other medications in past (that pt has been on and had to stop taking--Jardience,Trulicity and metformin) and also discussed diet and exercise.  Encouraged pt to attend OP education.Pt to begin seeing an Endo,yu Robertson.        Electronically signed by:  Teri Thomas RN  06/24/25 12:57 EDT

## 2025-06-24 NOTE — PLAN OF CARE
Goal Outcome Evaluation:      MRI completed. Awaiting neurology consult. CLEVE/INDIA Skinner.       Problem: Adult Inpatient Plan of Care  Goal: Plan of Care Review  Outcome: Progressing  Goal: Patient-Specific Goal (Individualized)  Outcome: Progressing  Goal: Absence of Hospital-Acquired Illness or Injury  Outcome: Progressing  Intervention: Identify and Manage Fall Risk  Recent Flowsheet Documentation  Taken 6/24/2025 0435 by Feliciano Rivera RN  Safety Promotion/Fall Prevention:   safety round/check completed   room organization consistent   clutter free environment maintained   assistive device/personal items within reach   activity supervised  Taken 6/24/2025 0202 by Feliciano Rivera RN  Safety Promotion/Fall Prevention:   safety round/check completed   room organization consistent   clutter free environment maintained   assistive device/personal items within reach   activity supervised  Taken 6/23/2025 2218 by Feliciano Rivera RN  Safety Promotion/Fall Prevention:   safety round/check completed   room organization consistent   clutter free environment maintained   assistive device/personal items within reach   activity supervised  Taken 6/23/2025 2120 by Feliciano Rivera RN  Safety Promotion/Fall Prevention:   safety round/check completed   room organization consistent   clutter free environment maintained   assistive device/personal items within reach  Taken 6/23/2025 2020 by Feliciano Rivera RN  Safety Promotion/Fall Prevention: patient off unit  Intervention: Prevent Skin Injury  Recent Flowsheet Documentation  Taken 6/23/2025 2120 by Feliciano Rivera RN  Body Position: position changed independently  Goal: Optimal Comfort and Wellbeing  Outcome: Progressing  Intervention: Provide Person-Centered Care  Recent Flowsheet Documentation  Taken 6/23/2025 2120 by Feliciano Rivera RN  Trust Relationship/Rapport:   care explained   choices provided  Goal: Readiness for Transition of Care  Outcome: Progressing     Problem:  Comorbidity Management  Goal: Blood Pressure in Desired Range  Outcome: Progressing

## 2025-06-24 NOTE — SIGNIFICANT NOTE
06/24/25 1312   OTHER   Discipline physical therapist   Therapy Assessment/Plan (PT)   Criteria for Skilled Interventions Met (PT) no  (Pt mobilizing indep'ly, [facility] 24. OT provided neck/shoulder HEP. consider OP PT/OT services at [facility]. will defer formal PT eval.)

## 2025-06-24 NOTE — THERAPY EVALUATION
Patient Name: Bob Escamilla  : 1955    MRN: [id number]                              Today's Date: 2025       Admit Date: 2025    Visit Dx:     ICD-10-CM ICD-9-CM   1. Acute cervical radiculopathy  M54.12 723.4   2. Facial paresthesia  R20.2 782.0     Patient Active Problem List   Diagnosis    Type 2 diabetes mellitus without complication, with long-term current use of insulin    Primary hypertension    Gastroesophageal reflux disease without esophagitis    Environmental and seasonal allergies    RLS (restless legs syndrome)    Elevated liver enzymes    Anemia    Hyperlipidemia    History of laparoscopic cholecystectomy    Bilateral leg edema    Chronic diarrhea    Chronic pain of both hips    History of right hip replacement    Anxiety disorder    Chronic midline low back pain with sciatica    Hypertriglyceridemia    Ankle edema, bilateral    Mild intermittent asthma    Bradycardia    Left sided numbness     Past Medical History:   Diagnosis Date    Arthritis     Asthma     Calculus of gallbladder without cholecystitis without obstruction 2022    Diabetes mellitus     GERD (gastroesophageal reflux disease)     Hx of cholecystectomy 2024    Hypertension     Inguinal hernia of left side without obstruction or gangrene     Medicare annual wellness visit, subsequent 2023    Sleep apnea      Past Surgical History:   Procedure Laterality Date    CATARACT EXTRACTION WITH INTRAOCULAR LENS IMPLANT Bilateral 2017    CHOLECYSTECTOMY N/A 2022    Procedure: CHOLECYSTECTOMY LAPAROSCOPIC WITH CHOLIANGIOGRAM;  Surgeon: Darin Juan MD;  Location: [facility];  Service: General;  Laterality: N/A;    COLONOSCOPY  10/07/2015    ENDOSCOPY N/A     INGUINAL HERNIA REPAIR Left     REPLACEMENT TOTAL HIP LATERAL POSITION      right       General Information       Row Name 25 1110          OT Time and Intention    Document Type discharge evaluation/summary  -SM     Mode of  "Treatment occupational therapy;individual therapy  -     Patient Effort good  -[facility] Name 06/24/25 1110          General Information    Patient Profile Reviewed yes  -     Prior Level of Function independent:;ADL's;driving;work  -     Existing Precautions/Restrictions no known precautions/restrictions  -SM       Row Name 06/24/25 1110          Living Environment    Current Living Arrangements home  -     People in Home spouse  -SM       Row Name 06/24/25 1110          Cognition    Orientation Status (Cognition) oriented x 4  -SM       Row Name 06/24/25 1110          Safety Issues/Impairments Affecting Functional Mobility    Impairments Affecting Function (Mobility) pain;strength  -               User Key  (r) = Recorded By, (t) = Taken By, (c) = Cosigned By      Initials Name Provider Type     Aracely Forte OTR/L, RICH Occupational Therapist                     Mobility/ADL's       [facility] 06/24/25 1113          Bed Mobility    Bed Mobility bed mobility (all) activities  -     All Activities, [place] (Bed Mobility) independent  -SM       Row Name 06/24/25 1113          Functional Mobility    Functional Mobility- Ind. Level independent  -SM       Row Name 06/24/25 1113          Activities of Daily Living    BADL Assessment/Intervention --  pt getting up independently for ADLs  -               User Key  (r) = Recorded By, (t) = Taken By, (c) = Cosigned By      Initials Name Provider Type     Aracely Forte OTR/L, CHANTALS Occupational Therapist                   Obj/Interventions       [facility] Name 06/24/25 1113          Sensory Assessment (Somatosensory)    Sensory Assessment \"painful\" \"achy\" primarly around L side of neck to shoulder  -[facility] Name 06/24/25 1113          Range of Motion Comprehensive    Comment, General Range of Motion stiffness, cervical ROM limited, L shoulder AROM WFL but painful per pt report  -[facility] Name 06/24/25 1113          Strength Comprehensive " (MMT)    Comment, General Manual Muscle Testing (MMT) Assessment  strength weak bilaterally- pt reports hx of OA affecting hands, BUE shoulder flex: 4-/5, shoulder abd 4-/5, shoulder ER 4-/5, shoulder IR 4-/5, elbow flex 4/5 MMT  -       Row Name 06/24/25 1113          Motor Skills    Therapeutic Exercise --  HEP provided with pt demonstrating understanding: shoulder rolls, wall walks shoulder flex and abd, cervical rotation, chin tucks, upper trap stretch  -               User Key  (r) = Recorded By, (t) = Taken By, (c) = Cosigned By      Initials Name Provider Type     Aracely Forte, OTR/L, CSRS Occupational Therapist                   Goals/Plan       Row Name 06/24/25 1120          ROM Goal 1 (OT)    ROM Goal 1 (OT) Pt to be independent with prescribed HEP.  -     Time Frame (ROM Goal 1, OT) short term goal (STG);by discharge  -     Progress/Outcome (ROM Goal 1, OT) goal met  -               User Key  (r) = Recorded By, (t) = Taken By, (c) = Cosigned By      Initials Name Provider Type     Aracely Forte, OTR/L, CSRS Occupational Therapist                   Clinical Impression       Row Name 06/24/25 1116          Pain Assessment    Pretreatment Pain Rating 5/10  -SM     Posttreatment Pain Rating 5/10  -     Pain Location shoulder;neck  -     Pain Side/Orientation left  -     Pain Management Interventions cold applied  ther ex  -SM       Row Name 06/24/25 1116          Plan of Care Review    Plan of Care Reviewed With patient  -     Outcome Evaluation Pt is a 69 y.o male presents to [facility] on 6/23 with L facial numbness, neck pain with LUE weakness and numbness. MRI of brain (-). C spine MRI showing degenerative changes. Initally worked up for stroke but now felt to be related to cervical soft tissue injury. Pt is a  and reports overuse injury likely at work. Pt is getting up independently but limited with L shoulder/neck pain. OT provided simple HEP at this time but  recommends pt follow up with outpatient PT at [facility].  -       Row Name 06/24/25 1116          Therapy Assessment/Plan (OT)    Therapy Frequency (OT) evaluation only  -       Row Name 06/24/25 1116          Therapy Plan Review/Discharge Plan (OT)    Anticipated Discharge Disposition (OT) home with outpatient therapy services  -       Row Name 06/24/25 1116          Positioning and Restraints    Pre-Treatment Position in bed  -SM     Post Treatment Position bed  -SM     In Bed fowlers;call light within reach;notified nsg  -               User Key  (r) = Recorded By, (t) = Taken By, (c) = Cosigned By      Initials Name Provider Type    Aracely Valdovinos, OTR/L, CSRS Occupational Therapist                   Outcome Measures       Row Name 06/24/25 1120          How much help from another is currently needed...    Putting on and taking off regular lower body clothing? 4  -SM     Bathing (including washing, rinsing, and drying) 4  -SM     Toileting (which includes using toilet bed pan or urinal) 4  -SM     Putting on and taking off regular upper body clothing 4  -SM     Taking care of personal grooming (such as brushing teeth) 4  -SM     Eating meals 4  -SM     AM-PAC 6 Clicks Score (OT) 24  -       Row Name 06/24/25 0800          How much help from another person do you currently need...    Turning from your back to your side while in flat bed without using bedrails? 4  -JJ     Moving from lying on back to sitting on the side of a flat bed without bedrails? 4  -JJ     Moving to and from a bed to a chair (including a wheelchair)? 4  -JJ     Standing up from a chair using your arms (e.g., wheelchair, bedside chair)? 4  -JJ     Climbing 3-5 steps with a railing? 4  -JJ     To walk in hospital room? 4  -JJ     AM-PAC 6 Clicks Score (PT) 24  -JJ       Row Name 06/24/25 1120          Functional Assessment    Outcome Measure Options AM-PAC 6 Clicks Daily Activity (OT)  -               User Key  (r) = Recorded By,  (t) = Taken By, (c) = Cosigned By      Initials Name Provider Type     Aracely Forte, OTR/L, CSRS Occupational Therapist    Errol Capps, RN Registered Nurse                    Occupational Therapy Education       Title: PT OT SLP Therapies (In Progress)       Topic: Occupational Therapy (In Progress)       Point: Home exercise program (Done)       Learning Progress Summary            Patient Acceptance, E,H,D,TB, VU,DU by  at 6/24/2025 1121    Comment: ice, rest, simple HEP for stretches, dc recommendations for f/u at PT clinic in [place] close to home.                                      User Key       Initials Effective Dates Name Provider Type Discipline     04/24/25 -  Aracely Forte OTR/L, CSRS Occupational Therapist OT                  OT Recommendation and Plan  Therapy Frequency (OT): evaluation only  Plan of Care Review  Plan of Care Reviewed With: patient  Outcome Evaluation: Pt is a 69 y.o male presents to [facility] on 6/23 with L facial numbness, neck pain with LUE weakness and numbness. MRI of brain (-). C spine MRI showing degenerative changes. Initally worked up for stroke but now felt to be related to cervical soft tissue injury. Pt is a  and reports overuse injury likely at work. Pt is getting up independently but limited with L shoulder/neck pain. OT provided simple HEP at this time but recommends pt follow up with outpatient PT at [facility].     Time Calculation:   Evaluation Complexity (OT)  Review Occupational Profile/Medical/Therapy History Complexity: brief/low complexity  Assessment, Occupational Performance/Identification of Deficit Complexity: 1-3 performance deficits  Clinical Decision Making Complexity (OT): problem focused assessment/low complexity  Overall Complexity of Evaluation (OT): low complexity     Time Calculation- OT       Row Name 06/24/25 1122             Time Calculation- OT    OT Start Time 1014  -      OT Stop Time 1039  -      OT Time Calculation (min) 25  min  -SM      OT Received On 06/24/25  -SM         Timed Charges    02927 - OT Therapeutic Exercise Minutes 10  -SM         Total Minutes    Timed Charges Total Minutes 10  -SM       Total Minutes 10  -SM                User Key  (r) = Recorded By, (t) = Taken By, (c) = Cosigned By      Initials Name Provider Type     Aracely Forte, OTR/L, CSRS Occupational Therapist                  Therapy Charges for Today       Code Description Service Date Service Provider Modifiers Qty    [id number]  OT THER PROC EA 15 MIN 6/24/2025 Aracely Forte OTR/L, CSRS GO 1    [id number]  OT EVAL LOW COMPLEXITY 3 6/24/2025 Aracely Forte OTR/L, CSRS GO 1                 DANIEL Hendricks/MOOK, CSRS  6/24/2025

## 2025-06-24 NOTE — CONSULTS
"Neurology Consult Note    Consult Date: 6/24/2025    Referring MD: No ref. provider found    Reason for Consult I have been asked to see the patient in neurological consultation to render advice and opinion regarding Left sided numbness      Bob Escamilla is a 69 y.o. male with past medical history of type 2 diabetes mellitus, osteoarthritis, asthma, hypertension, obstructive sleep apnea on CPAP presented to the ER with neck pain and left arm weakness and numbness he also endorses left facial numbness.    Patient states for the past couple of weeks he has been having left arm weakness and numbness.  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    Past Medical History:   Diagnosis Date    Arthritis     Asthma     Calculus of gallbladder without cholecystitis without obstruction 08/30/2022    Diabetes mellitus     GERD (gastroesophageal reflux disease)     Hx of cholecystectomy 1/26/2024    Hypertension     Inguinal hernia of left side without obstruction or gangrene     Medicare annual wellness visit, subsequent 9/19/2023    Sleep apnea        Exam  /65 (BP Location: Right arm, Patient Position: Lying)   Pulse 53   Temp 98.1 °F (36.7 °C) (Oral)   Resp 16   Ht 167.6 cm (66\")   Wt 114 kg (251 lb 14.4 oz)   SpO2 95%   BMI 40.66 kg/m²   Gen: NAD, vitals reviewed  MS: oriented x3, recent/remote memory intact, normal attention/concentration, language intact, no neglect.  CN: visual acuity grossly normal, PERRL, EOMI, no facial droop, no dysarthria  Motor: Left upper extremity strength proximal and distal 4+/5 rest of the extremities 5/5  Decreased sensation on the left upper neck  1+ reflexes bilateral upper and lower no Delroy signs    DATA:    Lab Results   Component Value Date    GLUCOSE 109 (H) 06/23/2025    CALCIUM 9.2 06/23/2025     06/23/2025    K 4.2 06/23/2025    CO2 22.4 06/23/2025     06/23/2025    BUN " 19.0 06/23/2025    CREATININE 1.03 06/23/2025    EGFRIFNONA 114 08/11/2021    BCR 18.4 06/23/2025    ANIONGAP 12.6 06/23/2025     Lab Results   Component Value Date    WBC 9.17 06/23/2025    HGB 14.3 06/23/2025    HCT 42.2 06/23/2025    MCV 96.6 06/23/2025     06/23/2025   Vitals  Afebrile  Pulse rate 50-60  Respiratory rate 14-16  Systolic blood pressure 130s to 140s    Lab review:   Sodium 136  Creatinine 1.03  Normal LFTs    A1c 7.5  TSH 2.15  B12 730 and folate 20      WBC 9.17  Hemoglobin 14.3 and platelets 272    Imaging review:     MRI brain showed no acute diffusion restriction note mild T2 flair sitosterol small vessel disease no SWI change    MRI cervical spine showed degenerative changes but no cord compression or edema, prevertebral soft tissue edema seen    Diagnoses:  Cervical neck soft tissue injury  Left arm weakness      Pre-stroke MRS: 0  NIHSS: 0    I think the etiology of patient's presentation is less likely a stroke or a TIA I think he has acute on chronic neck pain from his work-related injury.  MRI C-spine shows no cord changes but shows prevertebral soft tissue edema so we will get a CT soft tissue neck.    Plan  Baclofen as needed for muscle spasm  Lidocaine patch for pain  Medrol Dosepak  Outpatient physical therapy  Will follow-up in neurology clinic in a month to make sure weakness is not getting worse if it is he might need a EMG nerve conduction study.  Patient will also need work excuse for the couple of days until the injury gets better.      [facility]   Reviewed: Previous charts, nursing notes and vitals   Reviewed: Previous labs and CT/MRI brain/MRI C-spine scan    Interpretation: Labs and CT/MRI brain/MRI C-spine scan   Total time providing care is :30 minutes. This excluded time spent performing separately reportable procedures and services  Consults :Neurology/Stroke    Please note that portions of this note were completed with a voice recognition program.     Yoseph  Jack Colon MD  Neuro Hospitalist /Vascular Neurology.

## 2025-06-24 NOTE — DISCHARGE SUMMARY
ED OBSERVATION PROGRESS/DISCHARGE SUMMARY    Date of Admission: 6/23/2025   LOS: 0 days   PCP: Dimas Alexander APRN    Final Diagnosis: Acute on chronic neck pain    Hospital Outcome:     Bob Escamilla is a 69 y.o. male who was admitted to the ED observation unit with neck pain and associated left arm weakness and numbness for the past 3 weeks.  Labs unremarkable. EKG sinus rhythm, no acute ischemia. Chest x-ray negative for acute findings. Findings were discussed with stroke neurologist on-call.  MRI of the brain with and without contrast was negative and MRI of the cervical Spine with and without contrast reported apparent fullness within the prevertebral soft tissues, extending from C4-T2, with potentially edema and enhancement noted. CT of the neck soft tissue with contrast would allow for better assessment. X-ray imaging of bilateral shoulders performed that were reported as negative no acute osseous findings with plan for orthopedic referral as outpatient.  Multimodal analgesic medication given as well as IV steroids for symptom management.    6/24/25:   No acute events overnight. Vital signs remain stable.  CT soft tissue neck shows no evidence of a prevertebral mass or of abnormal enhancement.      Neurology was consulted has evaluated the patient.  They do not suspect CVA/TIA.  He likely has acute on chronic neck pain from work-related injury.  They recommend muscle relaxers as needed, lidocaine patch.  Patient declined a Medrol Dosepak due to concerns about side effects, hyperglycemia.  Sent referral for outpatient PT.  They will arrange for follow-up in their clinic in 1 month for possible EMG.    ROS:  General: no fevers, chills  Respiratory: no cough, dyspnea  Cardiovascular: no chest pain, palpitations  Abdomen: No abdominal pain, nausea, vomiting, or diarrhea  Neurologic: No focal weakness    Objective   Physical Exam:  I have reviewed the vital signs.  Temp:  [97.9 °F (36.6 °C)-98.6 °F  (37 °C)] 98.1 °F (36.7 °C)  Heart Rate:  [53-76] 53  Resp:  [14-16] 16  BP: (134-157)/() 143/65  General Appearance:    Alert, cooperative, no distress  Head:    Normocephalic, atraumatic  Eyes:    Sclerae anicteric  Neck:   Supple, no mass  Lungs: Clear to auscultation bilaterally, respirations unlabored  Heart: Regular rate and rhythm, S1 and S2 normal, no murmur, rub or gallop  Abdomen:  Soft, nontender, bowel sounds active, nondistended  Extremities: No clubbing, cyanosis, or edema to lower extremities  Pulses:  2+ and symmetric in distal lower extremities  Skin: No rashes   Neurologic: Oriented x3, Normal strength to extremities    Results Review:    I have reviewed the labs, radiology results and diagnostic studies.    Results from last 7 days   Lab Units 06/23/25  1637   WBC 10*3/mm3 9.17   HEMOGLOBIN g/dL 14.3   HEMATOCRIT % 42.2   PLATELETS 10*3/mm3 272     Results from last 7 days   Lab Units 06/23/25  1637   SODIUM mmol/L 136   POTASSIUM mmol/L 4.2   CHLORIDE mmol/L 101   CO2 mmol/L 22.4   BUN mg/dL 19.0   CREATININE mg/dL 1.03   CALCIUM mg/dL 9.2   BILIRUBIN mg/dL 0.2   ALK PHOS U/L 70   ALT (SGPT) U/L 24   AST (SGOT) U/L 23   GLUCOSE mg/dL 109*     Imaging Results (Last 24 Hours)       Procedure Component Value Units Date/Time    CT Soft Tissue Neck With Contrast [[id number]] Collected: 06/24/25 1051     Updated: 06/24/25 1058    Narrative:      CT NECK WITH CONTRAST     HISTORY: Neck pain, abnormal MRI.     COMPARISON: MRI 6/23/2025.     FINDINGS:  The parotid glands appear unremarkable. The submandibular glands are  somewhat atrophic but otherwise unremarkable. The thyroid appears  unremarkable. Small subcentimeter nodes are identified involving the  posterior triangle neck bilaterally. There is no evidence of pathologic  adenopathy.     Sagittal reconstructions demonstrate moderate to severe loss of disc  height at C5-6 and C6-7. Moderate foraminal stenosis on the right is  appreciated at C5-6  and C6-7 secondary to uncovertebral degenerative  disease. Milder foraminal stenosis is present to the left at C5-6 and  C6-7.     The MRI examination of 6/23/2025 is hampered by patient motion as well  as the presence of saturation bands but raise the possibility of  abnormal signal and enhancement anterior to the cervical spine from  C4-T2.  There is no evidence of mass, edema or fluid in the prevertebral soft  tissues. The T2 hyperintensity noted on MRI examination likely  represented the esophagus.       Impression:      There is no evidence of a prevertebral mass or of abnormal  enhancement. Degenerative disease involving the cervical spine and  somewhat atrophic appearance of the submandibular glands is noted.     Radiation dose reduction techniques were utilized, including automated  exposure control and exposure modulation based on body size.        This report was finalized on 6/24/2025 10:54 AM by Dr. Lazaro Trivedi M.D on Workstation: [id number]       MRI Cervical Spine With & Without Contrast [[id number]] Collected: 06/23/25 2327     Updated: 06/23/25 2345    Narrative:      CERVICAL SPINE MRI WITHOUT AND WITH GADOLINIUM     HISTORY: Left-sided arm weakness and numbness as well as facial  paresthesias.; M54.12-Radiculopathy, cervical region; R20.2-Paresthesia  of skin     COMPARISON:  None.     FINDINGS:  Multiplanar images of the cervical spine obtained without and  with gadolinium.  Axial images obtained from C2-T1.     Images are significantly degraded by motion artifact. No obvious acute  fracture or subluxation of the cervical spine is seen. Intervertebral  disc space narrowing is most significant at C5-C6 and C6-C7. Marrow  signal is grossly normal. I do not see any definite cord signal  abnormalities. However, again, the exam is significantly compromised by  motion artifact. The patient has some fullness seen within the  prevertebral soft tissues noted extending from C4-T2, of  uncertain  clinical significance. It does appear edematous, with potentially some  enhancement. Clinical significance is uncertain. This would be better  assessed with CT of the neck soft tissue with contrast.     C2-C3: There is no canal stenosis or neuroforaminal narrowing.  C3-C4: There is no canal stenosis or neuroforaminal narrowing.  C4-C5: There is diffuse disc bulge. The patient has mild bilateral  neuroforaminal narrowing there is mild central canal stenosis.  C5-C6: There is diffuse disc bulge, although more significant on the  left. The patient has mild canal stenosis on the left. There appears to  be moderate neuroforaminal narrowing on the left.  C6-C7: There is diffuse disc bulge, with some mild central canal  stenosis. There is moderate neuroforaminal narrowing on the right and  mild narrowing on the left.  C7-T1: There is no central canal stenosis. The patient does appear to  have moderate neuroforaminal narrowing on the right.       Impression:      1. Degenerative changes, as noted above. Please note, accurate  assessment of degenerative changes is very compromised by the severe  motion artifact.  2. Apparent fullness within the prevertebral soft tissues, extending  from C4-T2, with potentially edema and enhancement noted. CT of the neck  soft tissue with contrast would allow for better assessment.           This report was finalized on 6/23/2025 11:41 PM by Dr. Bushra Beltran M.D on Workstation: [id number]       XR Shoulder 2+ View Bilateral [[id number]] Collected: 06/23/25 2143     Updated: 06/23/25 2147    Narrative:      2 VIEWS BILATERAL SHOULDERS     HISTORY: Pain and numbness     COMPARISON: None available.     FINDINGS:  Left shoulder: No acute fracture or subluxation of the left shoulder is  seen. There are degenerative changes of the left AC joint. No aggressive  osseous abnormalities are identified.     Right shoulder: No acute fracture or subluxation of the right shoulder  is seen.  There are degenerative changes of the right AC joint. No  aggressive osseous abnormalities are seen.       Impression:      No acute osseous findings.     This report was finalized on 6/23/2025 9:44 PM by Dr. Bushra Beltran M.D on Workstation: [id number]       MRI Brain With & Without Contrast [[id number]] Collected: 06/23/25 2138     Updated: 06/23/25 2146    Narrative:      BRAIN MRI WITH AND WITHOUT CONTRAST     HISTORY: Left-sided arm weakness and numbness as well as facial  paresthesias.; M54.12-Radiculopathy, cervical region; R20.2-Paresthesia  of skin     COMPARISON: None.     FINDINGS:  Multiplanar images of the head were obtained without and with  gadolinium. No areas of restricted diffusion are seen to suggest acute  infarct. The patient has some asymmetry of the lateral ventricles, as  the right is larger than the left. There is some periventricular and  deep white matter microangiopathic change. There is no midline shift or  mass effect. The intracranial flow voids appear intact. No definite  abnormality is seen on susceptibility weighted imaging. Postcontrast  imaging does not demonstrate any abnormal enhancement. Mucosal  thickening is noted within the paranasal sinuses, with significant  opacification of the right maxillary sinus noted.       Impression:      1. No acute intracranial abnormality.   No abnormal enhancement.        This report was finalized on 6/23/2025 9:42 PM by Dr. Bushra Beltran M.D on Workstation: [id number]       XR Chest 1 View [[id number]] Collected: 06/23/25 1745     Updated: 06/23/25 1753    Narrative:      XR CHEST 1 VW-     HISTORY: Male who is 69 years-old, arm numbness     TECHNIQUE: Frontal view of the chest     COMPARISON: 1/10/2025     FINDINGS: The heart size is borderline. Pulmonary vasculature is  unremarkable. No focal pulmonary consolidation, pleural effusion, or  pneumothorax. No acute osseous process.       Impression:      No focal pulmonary  consolidation. Borderline heart size.  Follow-up as clinical indications persist.     This report was finalized on 6/23/2025 5:50 PM by Dr. Toby Reddy M.D on Workstation: [id number]               I have reviewed the medications.     Discharge Medications        New Medications        Instructions Start Date   Lidocaine 4 %   1 patch, Transdermal, Every 24 Hours Scheduled, Remove & Discard patch within 12 hours or as directed by MD             Changes to Medications        Instructions Start Date   hydrOXYzine 25 MG tablet  Commonly known as: ATARAX  What changed: See the new instructions.   TAKE TWO TABLETS BY MOUTH AT NIGHT AS NEEDED FOR ITCHING, ALLERGIES OR ANXIETY             Continue These Medications        Instructions Start Date   acetaminophen 650 MG 8 hr tablet  Commonly known as: TYLENOL   650 mg, Oral, Every 8 Hours PRN      albuterol sulfate  (90 Base) MCG/ACT inhaler  Commonly known as: PROVENTIL HFA;VENTOLIN HFA;PROAIR HFA   2 puffs, Inhalation, Every 4 Hours PRN      aluminum-magnesium hydroxide-simethicone 200-200-20 MG/5ML suspension  Commonly known as: MAALOX/MYLANTA   15 mL, As Needed      amLODIPine 10 MG tablet  Commonly known as: NORVASC   10 mg, Daily      aspirin 81 MG EC tablet   81 mg, Oral, Daily      celecoxib 200 MG capsule  Commonly known as: CeleBREX   TAKE ONE CAPSULE BY MOUTH DAILY WITH FOOD      cetirizine 10 MG tablet  Commonly known as: zyrTEC   10 mg, Daily      Cholecalciferol 50 MCG (2000 UT) tablet   50 mcg, Oral, Daily      cyclobenzaprine 5 MG tablet  Commonly known as: FLEXERIL   5 mg, Oral, 2 Times Daily PRN      diphenhydrAMINE 25 MG tablet  Commonly known as: BENADRYL   Take  by mouth.      ferrous sulfate 325 (65 FE) MG EC tablet   325 mg, Daily      fexofenadine 180 MG tablet  Commonly known as: ALLEGRA   180 mg, Oral, Daily      glimepiride 2 MG tablet  Commonly known as: Amaryl   2 mg, Oral, Every Morning Before Breakfast      Glucosamine 500 MG  capsule   Take  by mouth.      glucose blood test strip   DX E11.9 check bs  daily accu check guide strips      guaiFENesin 600 MG 12 hr tablet  Commonly known as: MUCINEX   1,200 mg, Oral, 2 Times Daily      IRON 27 PO   1 tablet, Daily      Lancets misc   DX E11.9 Check bs once daily      methylPREDNISolone 4 MG dose pack  Commonly known as: MEDROL   Take as directed on package instructions.      montelukast 10 MG tablet  Commonly known as: Singulair   10 mg, Oral, Nightly      Multivitamin Adult tablet tablet  Generic drug: multivitamin with minerals   1 tablet, Oral, Daily      mupirocin 2 % ointment  Commonly known as: BACTROBAN       NON FORMULARY   1 tablet, 2 Times Daily      omega-3 acid ethyl esters 1 g capsule  Commonly known as: LOVAZA   1 g, Oral, 2 Times Daily      OSTEO BI-FLEX REGULAR STRENGTH PO   1 tablet, 2 Times Daily      oxymetazoline 0.05 % nasal spray  Commonly known as: AFRIN   2 sprays, Nightly      pantoprazole 40 MG EC tablet  Commonly known as: PROTONIX   40 mg, Oral, Daily      pioglitazone 30 MG tablet  Commonly known as: ACTOS   30 mg, Oral, Daily      PROSTATE HEALTH PO   Take  by mouth.      Urinozinc Plus tablet   1 tablet, Oral, 2 Times Daily, 1 in AM, 2 at night      rOPINIRole 1 MG tablet  Commonly known as: REQUIP   1 mg, Oral, Nightly      saccharomyces boulardii 250 MG capsule  Commonly known as: Florastor   250 mg, Oral, 2 Times Daily      terbinafine 250 MG tablet  Commonly known as: lamiSIL   250 mg      valsartan-hydrochlorothiazide 320-25 MG per tablet  Commonly known as: DIOVAN-HCT   1 tablet, Oral, Daily      VITAMIN-B COMPLEX PO   Take  by mouth.              ---------------------------------------------------------------------------------------------  Assessment & Plan   Assessment/Problem List    Left sided numbness    Plan:    Left facial numbness  Neck pain with left upper extremity weakness and numbness  EKG sinus rhythm, no acute ischemia  Chest x-ray negative  for acute infiltrate  MRI brain with and without was negative  MRI of C-Spine with and without reported apparent fullness within the prevertebral soft tissues, extending from C4-T2, with potentially edema and enhancement noted. CT of the neck soft tissue with contrast would allow for better assessment.   CT soft tissue neck shows no evidence of a prevertebral mass or of abnormal enhancement  ESR/CRP normal  B12 730, folate >20,000, TSH 2.150  Total cholesterol 157, HDL 42,   Continue low-dose aspirin  Neurology consulted  Patient has tolerated Flexeril in the past and tolerated that well, sent refill  Lidocaine patches  Patient declined Medrol Dosepak  Ambulatory referral for continued physical therapy  Neurology to arrange for follow-up in 1 month for potential EMG     Diabetes  Continue home regimen  A1c 7.50     Hypertension  Continue home regimen     Disposition: Home     Follow-up after Discharge: Primary care and neurology    This note will serve as a discharge summary    ANGIE Park 06/24/25 13:35 EDT    I have worn appropriate PPE during this patient encounter, sanitized my hands both with entering and exiting patient's room.    32 minutes has been spent by [facility] providers in the care of this patient while under observation status on this date 06/24/25

## 2025-06-24 NOTE — PROGRESS NOTES
BALJIT CALDERON ATTESTATION NOTE    The LETITIA and I have discussed this patient's history, physical exam, and treatment plan.  I have reviewed the documentation and personally had a face to face interaction with the patient. I affirm the documentation and agree with the treatment and plan.  The attached note describes my personal findings.      I provided a substantive portion of the care of this patient. I personally performed the physical exam, in its entirety. I personally spent 22 minutes on the care of this patient today.    Bob Escamilla is a 69 y.o. male who presented to the emergency department yesterday complaining of left upper extremity numbness and weakness for the last 3 weeks.  The patient required admission to the observation unit for further evaluation and management.  MRI of the brain was negative.  MRI of the cervical spine shows some fullness in the prevertebral soft tissues.  Neurology is consulted Dr. Colon recommends CT of the soft tissue neck given the fullness of the prevertebral soft tissues.  We have maintained him on steroids and are working to control his blood sugars while he is on the IV steroids.          On exam:  GENERAL: Awake, alert, no acute distress  SKIN: Warm, dry  HENT: Normocephalic, atraumatic  EYES: no scleral icterus  CV: regular rhythm, regular rate  RESPIRATORY: normal effort, lungs clear  ABDOMEN: soft, nontender, nondistended  MUSCULOSKELETAL: no deformity  NEURO: alert, moves all extremities, follows commands          Note Disclaimer: At [facility], we believe that sharing information builds trust and better relationships. You are receiving this note because you recently visited [facility]. It is possible you will see health information before a provider has talked with you about it. This kind of information can be easy to misunderstand. To help you fully understand what it means for your health, we urge you to discuss this note with your provider.

## 2025-06-24 NOTE — OUTREACH NOTE
Prep Survey      Flowsheet Row Responses   [facility] patient discharged from? [place]   Is LACE score < 7 ? Yes   Eligibility [facility]   Date of Admission 06/23/25   Date of Discharge 06/24/25   Discharge Disposition Home or Self Care   Discharge diagnosis Left sided numbness   Does the patient have one of the following disease processes/diagnoses(primary or secondary)? Other   Does the patient have Home health ordered? No   Is there a DME ordered? No   Prep survey completed? Yes            Xenia KELLER - Registered Nurse

## 2025-06-24 NOTE — PLAN OF CARE
Goal Outcome Evaluation:  Plan of Care Reviewed With: patient           Outcome Evaluation: Pt is a 69 y.o male presents to [facility] on 6/23 with L facial numbness, neck pain with LUE weakness and numbness. MRI of brain (-). C spine MRI showing degenerative changes. Initally worked up for stroke but now felt to be related to cervical soft tissue injury. Pt is a  and reports overuse injury likely at work. Pt is getting up independently but limited with L shoulder/neck pain. OT provided simple HEP at this time but recommends pt follow up with outpatient PT at [facility].    Anticipated Discharge Disposition (OT): home with outpatient therapy services

## 2025-06-25 ENCOUNTER — OFFICE VISIT (OUTPATIENT)
Dept: FAMILY MEDICINE CLINIC | Facility: CLINIC | Age: 70
End: 2025-06-25
Payer: MEDICARE

## 2025-06-25 ENCOUNTER — TRANSITIONAL CARE MANAGEMENT TELEPHONE ENCOUNTER (OUTPATIENT)
Dept: CALL CENTER | Facility: HOSPITAL | Age: 70
End: 2025-06-25
Payer: MEDICARE

## 2025-06-25 VITALS
HEIGHT: 66 IN | TEMPERATURE: 98 F | BODY MASS INDEX: 40.56 KG/M2 | OXYGEN SATURATION: 97 % | DIASTOLIC BLOOD PRESSURE: 72 MMHG | SYSTOLIC BLOOD PRESSURE: 150 MMHG | HEART RATE: 63 BPM | WEIGHT: 252.4 LBS

## 2025-06-25 DIAGNOSIS — R93.0 OPACIFICATION OF RIGHT MAXILLARY SINUS: ICD-10-CM

## 2025-06-25 DIAGNOSIS — M48.02 FORAMINAL STENOSIS OF CERVICAL REGION: ICD-10-CM

## 2025-06-25 DIAGNOSIS — M54.12 CERVICAL RADICULOPATHY: ICD-10-CM

## 2025-06-25 DIAGNOSIS — M48.02 SPINAL STENOSIS OF CERVICAL REGION: ICD-10-CM

## 2025-06-25 DIAGNOSIS — M54.2 CHRONIC NECK PAIN: ICD-10-CM

## 2025-06-25 DIAGNOSIS — M25.512 LEFT SHOULDER PAIN, UNSPECIFIED CHRONICITY: ICD-10-CM

## 2025-06-25 DIAGNOSIS — R20.0 LEFT FACIAL NUMBNESS: ICD-10-CM

## 2025-06-25 DIAGNOSIS — M50.30 BULGING OF CERVICAL INTERVERTEBRAL DISC: ICD-10-CM

## 2025-06-25 DIAGNOSIS — Z09 HOSPITAL DISCHARGE FOLLOW-UP: Primary | ICD-10-CM

## 2025-06-25 DIAGNOSIS — G89.29 CHRONIC NECK PAIN: ICD-10-CM

## 2025-06-25 NOTE — ASSESSMENT & PLAN NOTE
Prior Authorization Approval    Authorization Effective Date:    Authorization Expiration Date:    Medication: Norditropin Flexpro 10 mg - Pending  Approved Dose/Quantity: 3/28  Reference #: (Key: [id number])   Insurance Company: [employer] - Phone [phone] Fax [phone]  Expected CoPay:Unknown      CoPay Card Available:No     Foundation Assistance Needed:no    Which Pharmacy is filling the prescription (Not needed for infusion/clinic administered): [facility] - [place] [address]  Pharmacy Notified: Yes  Patient Notified: Yes       Resolved

## 2025-06-25 NOTE — OUTREACH NOTE
Call Center TCM Note      Flowsheet Row Responses   [facility] patient discharged from? [place]   Does the patient have one of the following disease processes/diagnoses(primary or secondary)? Other   TCM attempt successful? Yes   Call start time 1016   Call end time 1021   Discharge diagnosis Left sided numbness   Meds reviewed with patient/caregiver? Yes   Is the patient having any side effects they believe may be caused by any medication additions or changes? No   Does the patient have all medications ordered at discharge? Yes   Is the patient taking all medications as directed (includes completed medication regime)? Yes   Does the patient have an appointment with their PCP within 7-14 days of discharge? No   Nursing Interventions Patient declined scheduling/rescheduling appointment at this time, Routed TCM call to PCP office   Has home health visited the patient within 72 hours of discharge? N/A   Psychosocial issues? No   Did the patient receive a copy of their discharge instructions? Yes   Nursing interventions Reviewed instructions with patient   What is the patient's perception of their health status since discharge? Improving   Is the patient/caregiver able to teach back signs and symptoms related to disease process for when to call PCP? Yes   Is the patient/caregiver able to teach back signs and symptoms related to disease process for when to call 911? Yes   Is the patient/caregiver able to teach back the hierarchy of who to call/visit for symptoms/problems? PCP, Specialist, Home health nurse, Urgent Care, ED, 911 Yes   TCM call completed? Yes   Wrap up additional comments Patient denies concerns today.   Call end time 1021   Would this patient benefit from a Referral to Amb Social Work? No   Is the patient interested in additional calls from an ambulatory ? No            Atiya [facility] - Registered Nurse    6/25/2025, 10:22 EDT

## 2025-06-25 NOTE — PROGRESS NOTES
"Chief Complaint  office visit  (Hos. Two nights ago shoulder injury/Forms fills paperwork)    Subjective        Bob Escamilla presents to [facility]  History of Present Illness  69-year-old white male here for recent posthospital discharge follow-up visit.  Patient stated he stayed overnight in the hospital recently.      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 Pt reports he had numbness in his left face region which has subsequently resolved. Pt also reports he experienced numbness in his left upper extremity with this episode.  Patient also complaining of left shoulder pain.  Patient was seen in the ER and also evaluated for possible stroke which revealed negative stroke workup and neurology was also consulted.    Pt drove himself to ER to [facility] and then ambulance took pt to the ER from [facility] past Monday 6/23/25.      Pt has PT scheduled on 6/30/25.    Patient desires FMLA paperwork completion for work due to his recent neck and facial numbness symptoms.  Patient also desires FMLA paperwork completion for his wife who is going to have shoulder replacement surgery on July 1 so that he could be around to take care of her medical needs and daily activities until recovery.      Objective   Vital Signs:  /72 (BP Location: Left arm, Patient Position: Sitting, Cuff Size: Adult)   Pulse 63   Temp 98 °F (36.7 °C)   Ht 167.6 cm (65.98\")   Wt 114 kg (252 lb 6.4 oz)   SpO2 97%   BMI 40.76 kg/m²   Estimated body mass index is 40.76 kg/m² as calculated from the following:    Height as of this encounter: 167.6 cm (65.98\").    Weight as of this encounter: 114 kg (252 lb 6.4 oz).               Physical Exam  Constitutional:       Appearance: Normal appearance.   HENT:      Head: Normocephalic and atraumatic.   Eyes:      Conjunctiva/sclera: Conjunctivae normal. "   Cardiovascular:      Rate and Rhythm: Normal rate and regular rhythm.      Heart sounds: Normal heart sounds.   Pulmonary:      Effort: Pulmonary effort is normal.      Breath sounds: Normal breath sounds.   Abdominal:      General: Bowel sounds are normal.      Palpations: Abdomen is soft.      Comments: Non-tender   Musculoskeletal:      Comments: Neck pain with cervical spine palpation.  Left trapezius pain with left neck rotation.  Left trapezius pain with neck lateral flexion.  Left shoulder pain with shoulder ROM.   Skin:     General: Skin is warm.   Neurological:      General: No focal deficit present.      Mental Status: He is alert and oriented to person, place, and time.   Psychiatric:         Mood and Affect: Mood normal.         Behavior: Behavior normal.        Result Review :    The following data was reviewed by: ANGIE Tang on 06/25/2025:  CMP          10/2/2024    08:39 1/10/2025    09:46 6/23/2025    16:37   CMP   Glucose 132  150  109    BUN 13  13  19.0    Creatinine 0.83  0.90  1.03    EGFR 95.9  93.6  79.6    Sodium 138  138  136    Potassium 4.5  4.6  4.2    Chloride 104  104  101    Calcium 9.4  8.9  9.2    Total Protein 6.9  6.3  7.2    Albumin 4.5  3.9  4.1    Globulin 2.4  2.4  3.1    Total Bilirubin 0.7  0.5  0.2    Alkaline Phosphatase 74  66  70    AST (SGOT) 20  18  23    ALT (SGPT) 24  25  24    Albumin/Globulin Ratio 1.9  1.6  1.3    BUN/Creatinine Ratio 15.7  14.4  18.4    Anion Gap   12.6      CBC          10/2/2024    08:39 1/10/2025    09:46 6/23/2025    16:37   CBC   WBC 7.53  6.30  9.17    RBC 4.58  4.10  4.37    Hemoglobin 15.1  13.7  14.3    Hematocrit 43.9  39.5  42.2    MCV 95.9  96.3  96.6    MCH 33.0  33.4  32.7    MCHC 34.4  34.7  33.9    RDW 12.9  12.7  12.8    Platelets 247  238  272      Lipid Panel          10/2/2024    08:39 2/18/2025    08:45 6/24/2025    03:30   Lipid Panel   Total Cholesterol   157    Total Cholesterol 160  137    "  Triglycerides 153  101  75    HDL Cholesterol 42  37  42    VLDL Cholesterol 27  19  15    LDL Cholesterol  91  81  100    LDL/HDL Ratio   2.38      TSH          2/18/2025    08:45 6/23/2025    16:37   TSH   TSH 1.420  2.150      A1C Last 3 Results          10/2/2024    08:39 2/18/2025    08:45 6/24/2025    03:30   HGBA1C Last 3 Results   Hemoglobin A1C 6.80  7.1  7.50      Microalbumin          2/18/2025    08:59   Microalbumin   Microalbumin, Urine 29.7      PSA          10/2/2024    08:39   PSA   PSA 0.277      Data reviewed : Radiologic studies reviewed recent CT and MRI of the neck and head and Recent hospitalization notes reviewed recent hospital admission report.    CT of neck report from June 23, 2025, results pasted below-    \"CERVICAL SPINE MRI WITHOUT AND WITH GADOLINIUM     HISTORY: Left-sided arm weakness and numbness as well as facial  paresthesias.; M54.12-Radiculopathy, cervical region; R20.2-Paresthesia  of skin     COMPARISON:  None.     FINDINGS:  Multiplanar images of the cervical spine obtained without and  with gadolinium.  Axial images obtained from C2-T1.     Images are significantly degraded by motion artifact. No obvious acute  fracture or subluxation of the cervical spine is seen. Intervertebral  disc space narrowing is most significant at C5-C6 and C6-C7. Marrow  signal is grossly normal. I do not see any definite cord signal  abnormalities. However, again, the exam is significantly compromised by  motion artifact. The patient has some fullness seen within the  prevertebral soft tissues noted extending from C4-T2, of uncertain  clinical significance. It does appear edematous, with potentially some  enhancement. Clinical significance is uncertain. This would be better  assessed with CT of the neck soft tissue with contrast.     C2-C3: There is no canal stenosis or neuroforaminal narrowing.  C3-C4: There is no canal stenosis or neuroforaminal narrowing.  C4-C5: There is diffuse disc " "bulge. The patient has mild bilateral  neuroforaminal narrowing there is mild central canal stenosis.  C5-C6: There is diffuse disc bulge, although more significant on the  left. The patient has mild canal stenosis on the left. There appears to  be moderate neuroforaminal narrowing on the left.  C6-C7: There is diffuse disc bulge, with some mild central canal  stenosis. There is moderate neuroforaminal narrowing on the right and  mild narrowing on the left.  C7-T1: There is no central canal stenosis. The patient does appear to  have moderate neuroforaminal narrowing on the right.     IMPRESSION:  1. Degenerative changes, as noted above. Please note, accurate  assessment of degenerative changes is very compromised by the severe  motion artifact.  2. Apparent fullness within the prevertebral soft tissues, extending  from C4-T2, with potentially edema and enhancement noted. CT of the neck  soft tissue with contrast would allow for better assessment.\"    MRI OF BRAIN RESULTS from June 23, 2025, results pasted below-    \"FINDINGS:  Multiplanar images of the head were obtained without and with  gadolinium. No areas of restricted diffusion are seen to suggest acute  infarct. The patient has some asymmetry of the lateral ventricles, as  the right is larger than the left. There is some periventricular and  deep white matter microangiopathic change. There is no midline shift or  mass effect. The intracranial flow voids appear intact. No definite  abnormality is seen on susceptibility weighted imaging. Postcontrast  imaging does not demonstrate any abnormal enhancement. Mucosal  thickening is noted within the paranasal sinuses, with significant  opacification of the right maxillary sinus noted.     IMPRESSION:  1. No acute intracranial abnormality.   No abnormal enhancement.\"         Assessment and Plan     Diagnoses and all orders for this visit:    1. Hospital discharge follow-up (Primary)    2. Bulging of cervical " intervertebral disc  -     Ambulatory Referral to Neurosurgery    3. Foraminal stenosis of cervical region  -     Ambulatory Referral to Neurosurgery    4. Spinal stenosis of cervical region  -     Ambulatory Referral to Neurosurgery    5. Cervical radiculopathy  -     Ambulatory Referral to Neurosurgery    6. Chronic neck pain  -     Ambulatory Referral to Neurosurgery    7. Left shoulder pain, unspecified chronicity  -     Ambulatory Referral to Orthopedic Surgery    8. Opacification of right maxillary sinus  -     Ambulatory Referral to ENT (Otolaryngology)    9. Left facial numbness  Assessment & Plan:  Resolved    Orders:  -     Duplex Carotid Ultrasound CAR; Future        All chronic conditions have been addressed and treated by the practice or other specialists. Medications have been reconciled and refilled as appropriate. Reiterated compliance and timely follow up appointments. Side effects of all new and old medications reviewed with the patient and patient willing to accept all risks involved. Advised RTO if no improvement or worsening of symptoms or if any new complaints arise. Patient advised to follow up with clinic or call after diagnostic tests, if patient does not hear from office 3 days after the test completion.          Follow Up     Return in about 4 weeks (around 7/23/2025) for Next scheduled follow up.  Patient was given instructions and counseling regarding his condition or for health maintenance advice. Please see specific information pulled into the AVS if appropriate.

## 2025-06-26 NOTE — PROGRESS NOTES
"Subjective   History of Present Illness: Bob Escamilla is a 69 y.o. male is being seen for consultation today at the request of Dimas Castillofor evaluation of his neck.  Patient describes episodes of work related activities that have become increasingly difficult due to pain that develops in the neck.  The most recent episode caused him to develop some left facial numbness and a weak sensation on the left.  He was advised by urgent treatment to seek emergent care through the hospital and he was admitted overnight for comprehensive stroke and musculoskeletal evaluation.  He had MRI brain that ruled out stroke.  He had MRI cervical spine which showed degenerative change.  The MRI of the cervical spine suggested a possible soft tissue mass in the neck and a CT scan of the neck ruled out that mass.  He has a ENT appointment next week that I presume is to be evaluated for the possible neck mass that is been ruled out by the CT scan of the neck.  Patient describes that he has multiple areas of joint stiffness including the neck shoulders and predominantly in his hands.    History of Present Illness    Tobacco Use: Low Risk  (7/2/2025)    Patient History     Smoking Tobacco Use: Never     Smokeless Tobacco Use: Never     Passive Exposure: Not on file        The following portions of the patient's history were reviewed and updated as appropriate: allergies, current medications, past family history, past medical history, past social history, past surgical history and problem list.    Review of Systems   Constitutional:  Negative for chills and fever.   HENT:  Negative for congestion.    Musculoskeletal:  Positive for myalgias and neck pain.        L shoulder pain    Neurological:  Positive for weakness and numbness.        L side of face; L arm/hand        Objective     Vitals:    07/02/25 1128   BP: 140/78   Pulse: 68   SpO2: 97%   Weight: 114 kg (252 lb)   Height: 167.6 cm (65.98\")     Body mass index is 40.7 " kg/m².      Physical Exam  Neurological Exam    Physical Exam:    CONSTITUTIONAL: This 69 year old  male appears well developed, well-nourished and in no acute distress.    HEAD & FACE: the head and face are symmetric, normocephalic and atraumatic.  He is wearing a Bluetooth audio device in his right ear    EYES: Inspection of the conjunctivae and lids reveals no swelling, erythema or discharge.  Pupils are round, equal and reactive to light and there is no scleral icterus.    EARS, NOSE, MOUTH & THROAT: On inspection, the ears and nose are within normal limits.    NECK: His neck reveals marked diminished range of motion with barely any extension, only about 15 degrees of lateral bending and about 30 degrees of rotation each direction with stiffness and discomfort when he gets to the extremes. The trachea is midline with no masses.      PULMONARY: Respiratory effort is normal with no increased work of breathing or signs of respiratory distress.    CARDIOVASCULAR:  Examination of the extremities shows no edema or varicosities.    MUSCULOSKELETAL: Gait and station are within normal limits. The spine has no tenderness in the midline cervical spine.    SKIN: The skin is warm, dry and intact    NEUROLOGIC:   Cranial Nerves 2-12 intact without any numbness or focal deficits he says he has an unusual sensation along his chin and mandible but it is not numb  Normal motor strength noted. Muscle bulk and tone are normal.  Sensory exam is normal to fine touch to confrontational testing bilaterally  Reflexes on the right side demonstrates 1/4 Triceps Reflex, 1/4 Biceps Reflex, 1/4 Brachioradialis Reflex, 1/4 Knee Jerk Reflex, 0/4 Ankle Jerk Reflex and no ankle clonus on the right.   Reflexes on the left side demonstrates 1/4 Triceps Reflex, 1/4 Biceps Reflex, 1/4 Brachioradialis Reflex, 1/4 Knee Jerk Reflex, 0/4 Ankle Jerk Reflex and no ankle clonus on the left.  Superficial/Primitive Reflexes: primitive reflexes were  absent.  Rosa's, Babinski, and Clonus signs all negative.  No coordination deficit observed.  Radicular testing showed a negative Spurling's maneuver  Cortical function is intact and without deficits. Speech is normal.    PSYCHIATRIC: oriented to person, place and time. Patient's mood and affect are normal.      Assessment & Plan   Independent Review of Radiographic Studies:      I personally reviewed the images from the following studies.    MRI of the cervical spine done on June 23, 2025 at [facility] demonstrates degenerative changes throughout the cervical spine without any canal stenosis at C2-C3, C3-C4, C4-C5, and C7-T1.  There is mild canal stenosis at C5-C6 and C6-C7 due to degenerative change.  There is moderate neuroforaminal narrowing to the left at C5-C6 and moderate neuroforaminal narrowing to the right at C6-C7.            Medical Decision Making:      Patient has significant spondylosis of the cervical spine with some moderate canal narrowing but there is no specific myelopathy or radiculopathy on exam.  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  He applied for short-term disability after last week's event and was declined because they stated it should be Workmen's Compensation.  Patient did not report a specific work-related injury to me.    From the neurosurgical perspective he has chronic neck and other musculoskeletal issues relating to arthritis without any evidence of radiculopathy or myelopathy on clinical exam.  There is no role for any neurosurgical intervention but I think given the struggles he has had over time as he approaches group home age I think he needs to consult with a rehab specialist for a  possible functional capacity exam and any recommendations upon treatment to keep active to his planned detention age in 2 years.    No follow-ups on file.    Diagnoses and all orders for this visit:    1. Chronic neck pain (Primary)  -     Ambulatory Referral to Physical Medicine Rehab    2. Cervical spondylosis without myelopathy  -     Ambulatory Referral to Physical Medicine Rehab             Gerber Hudson MD FACS FAANS  Neurological Surgery

## 2025-06-26 NOTE — CASE MANAGEMENT/SOCIAL WORK
Case Management Discharge Note      Final Note: Home via private vehicle with Outpt PT follow up in [facility] Post Acute Provider List?: N/A    Selected Continued Care - Discharged on 6/24/2025 Admission date: 6/23/2025 - Discharge disposition: Home or Self Care      Destination    No services have been selected for the patient.                Durable Medical Equipment    No services have been selected for the patient.                Dialysis/Infusion    No services have been selected for the patient.                Home Medical Care    No services have been selected for the patient.                Therapy    No services have been selected for the patient.                Community Resources    No services have been selected for the patient.                Community & DME    No services have been selected for the patient.                    Transportation Services  Transportation: Private Transportation  Private: Car    Final Discharge Disposition Code: 01 - home or self-care

## 2025-06-30 ENCOUNTER — TELEPHONE (OUTPATIENT)
Dept: FAMILY MEDICINE CLINIC | Facility: CLINIC | Age: 70
End: 2025-06-30
Payer: MEDICARE

## 2025-06-30 DIAGNOSIS — M54.40 CHRONIC MIDLINE LOW BACK PAIN WITH SCIATICA, SCIATICA LATERALITY UNSPECIFIED: ICD-10-CM

## 2025-06-30 DIAGNOSIS — M50.30 BULGING OF CERVICAL INTERVERTEBRAL DISC: ICD-10-CM

## 2025-06-30 DIAGNOSIS — M54.2 NECK PAIN: Primary | ICD-10-CM

## 2025-06-30 DIAGNOSIS — G89.29 CHRONIC MIDLINE LOW BACK PAIN WITH SCIATICA, SCIATICA LATERALITY UNSPECIFIED: ICD-10-CM

## 2025-06-30 RX ORDER — CYCLOBENZAPRINE HCL 5 MG
10 TABLET ORAL NIGHTLY PRN
Qty: 60 TABLET | Refills: 5 | Status: SHIPPED | OUTPATIENT
Start: 2025-06-30

## 2025-06-30 NOTE — TELEPHONE ENCOUNTER
Name: Andi Center J      Relationship: Self      Best Callback Number: [phone] (Mobile)       HUB PROVIDED THE RELAY MESSAGE FROM THE OFFICE      PATIENT: HAS FURTHER QUESTIONS AND WOULD LIKE A CALL BACK AT THE FOLLOWING PHONE [phone]    ADDITIONAL INFORMATION:PLEASE CONTACT PATIENT TO ADVISE.

## 2025-06-30 NOTE — TELEPHONE ENCOUNTER
Patient informed and agreeable.     Patient requesting refills of Cyclobenzaprine. Was given small supply by hospital. States this has been helping.

## 2025-06-30 NOTE — TELEPHONE ENCOUNTER
----- Message from Arabella AMES sent at 6/27/2025  2:26 PM EDT -----  Regarding: referral  Mycharted the patient. No answer yet.   Just giving to the pool while I'm gone   Thanks.  ----- Message -----  From: Dimas Alexander APRN  Sent: 6/25/2025   5:33 PM EDT  To: Arabella Howell MA    Please inform patient I have also referred him to ENT because on the MRI of the brain he has complete whiteout or blockage of the right maxillary sinus.    Also patient needs to have a carotid ultrasound of neck artery ultrasound because the facial numbness could be due to blockages in the arteries of his neck and this needs to be further evaluated.

## 2025-06-30 NOTE — TELEPHONE ENCOUNTER
Relay     Mesfin referred you to ENT because on the MRI of the brain, you have complete whiteout or blockage of the right maxillary sinus.     Also you'll need to have a carotid ultrasound of neck artery ultrasound because the facial numbness could be due to blockages in the arteries of his neck and this needs to be further evaluated.     Left message to call back. May also reach us through [employer].

## 2025-07-01 NOTE — PROGRESS NOTES
New Shoulder      Patient: Bob Escamilla        YOB: 1955    Medical Record Number: [id number]        Chief Complaints: Left      History of Present Illness: This is a 69-year-old male is right-hand-dominant presents left shoulder pain is been ongoing for many many months he is a  he does not have to unload his load but he does have to crank it up he has had a lot of neck and upper back issues has an MRI of his neck and there and he does have some arthritis.  His shoulder is bothering him with activity      Allergies:   Allergies   Allergen Reactions    Benzamide Derivatives     Formaldehyde Itching     Pt is only able to wear cotton clothing    Benzonatate Rash    Corticosteroids Rash     An allergy to hydrocortisone cream, has had no reaction other than from an allergy test performed 20 years ago    Cortisone Rash    Fenugreek (Trigonella Foenum-Graecum) Rash       Medications:   Home Medications:  Current Outpatient Medications on File Prior to Visit   Medication Sig    acetaminophen (TYLENOL) 650 MG 8 hr tablet Take 1 tablet by mouth Every 8 (Eight) Hours As Needed for Mild Pain.    albuterol sulfate  (90 Base) MCG/ACT inhaler Inhale 2 puffs Every 4 (Four) Hours As Needed for Wheezing or Shortness of Air.    aluminum-magnesium hydroxide-simethicone (MAALOX/MYLANTA) 200-200-20 MG/5ML suspension Take 15 mL by mouth As Needed for Indigestion or Heartburn.    amLODIPine (NORVASC) 10 MG tablet Take 1 tablet by mouth Daily.    aspirin 81 MG EC tablet Take 1 tablet by mouth Daily.    B Complex Vitamins (VITAMIN-B COMPLEX PO) Take  by mouth.    celecoxib (CeleBREX) 200 MG capsule TAKE ONE CAPSULE BY MOUTH DAILY WITH FOOD    cetirizine (zyrTEC) 10 MG tablet Take 1 tablet by mouth Daily.    Cholecalciferol 50 MCG (2000 UT) tablet Take 1 tablet by mouth Daily.    cyclobenzaprine (FLEXERIL) 5 MG tablet Take 2 tablets by mouth At Night As Needed for Muscle Spasms. Please do not drive or  operate heavy machinery after taking the medication, as the medication may cause drowsiness.    diphenhydrAMINE (BENADRYL) 25 MG tablet Take  by mouth.    Ferrous Gluconate (IRON 27 PO) Take 1 tablet by mouth Daily.    ferrous sulfate 325 (65 FE) MG EC tablet Take 1 tablet by mouth Daily.    fexofenadine (ALLEGRA) 180 MG tablet Take 1 tablet by mouth Daily.    glimepiride (Amaryl) 2 MG tablet Take 1 tablet by mouth Every Morning Before Breakfast.    Glucosamine 500 MG capsule Take  by mouth.    Glucosamine-Chondroitin (OSTEO BI-FLEX REGULAR STRENGTH PO) Take 1 tablet by mouth 2 (Two) Times a Day.    glucose blood test strip DX E11.9 check bs  daily accu check guide strips    guaiFENesin (MUCINEX) 600 MG 12 hr tablet Take 2 tablets by mouth 2 (Two) Times a Day.    hydrOXYzine (ATARAX) 25 MG tablet TAKE TWO TABLETS BY MOUTH AT NIGHT AS NEEDED FOR ITCHING, ALLERGIES OR ANXIETY (Patient taking differently: Take 0.5 tablets by mouth.)    Lancets misc DX E11.9 Check bs once daily    Lidocaine 4 % Place 1 patch on the skin as directed by provider Daily. Remove & Discard patch within 12 hours or as directed by MD    methylPREDNISolone (MEDROL) 4 MG dose pack Take as directed on package instructions.    Misc Natural Products (PROSTATE HEALTH PO) Take  by mouth.    Misc Natural Products (Urinozinc Plus) tablet Take 1 tablet by mouth 2 (Two) Times a Day. 1 in AM, 2 at night    montelukast (Singulair) 10 MG tablet Take 1 tablet by mouth Every Night. (Patient taking differently: Take 0.5 tablets by mouth 2 (Two) Times a Day.)    multivitamin with minerals (Multivitamin Adult) tablet tablet Take 1 tablet by mouth Daily for 360 days.    mupirocin (BACTROBAN) 2 % ointment     NON FORMULARY Take 1 tablet by mouth 2 (Two) Times a Day. Focus Factor    omega-3 acid ethyl esters (LOVAZA) 1 g capsule Take 1 capsule by mouth 2 (Two) Times a Day.    oxymetazoline (AFRIN) 0.05 % nasal spray Administer 2 sprays into the nostril(s) as  directed by provider Every Night.    pantoprazole (PROTONIX) 40 MG EC tablet Take 1 tablet by mouth Daily.    pioglitazone (ACTOS) 30 MG tablet TAKE 1 TABLET BY MOUTH DAILY    rOPINIRole (REQUIP) 1 MG tablet Take 1 tablet by mouth Every Night.    saccharomyces boulardii (Florastor) 250 MG capsule Take 1 capsule by mouth 2 (Two) Times a Day.    terbinafine (lamiSIL) 250 MG tablet Take 1 tablet by mouth.    valsartan-hydrochlorothiazide (DIOVAN-HCT) 320-25 MG per tablet Take 1 tablet by mouth Daily.     No current facility-administered medications on file prior to visit.     Current Medications:  Scheduled Meds:  Continuous Infusions:No current facility-administered medications for this visit.    PRN Meds:.    Past Medical History:   Diagnosis Date    Arthritis     Asthma     Calculus of gallbladder without cholecystitis without obstruction 08/30/2022    Diabetes mellitus     GERD (gastroesophageal reflux disease)     Hx of cholecystectomy 1/26/2024    Hypertension     Inguinal hernia of left side without obstruction or gangrene     Medicare annual wellness visit, subsequent 9/19/2023    Sleep apnea         Past Surgical History:   Procedure Laterality Date    CATARACT EXTRACTION WITH INTRAOCULAR LENS IMPLANT Bilateral 01/2017    CHOLECYSTECTOMY N/A 08/30/2022    Procedure: CHOLECYSTECTOMY LAPAROSCOPIC WITH CHOLIANGIOGRAM;  Surgeon: Darin Juan MD;  Location: [facility];  Service: General;  Laterality: N/A;    COLONOSCOPY  10/07/2015    ENDOSCOPY N/A 2017    INGUINAL HERNIA REPAIR Left     REPLACEMENT TOTAL HIP LATERAL POSITION      right         Social History     Occupational History    Not on file   Tobacco Use    Smoking status: Never    Smokeless tobacco: Never   Vaping Use    Vaping status: Never Used   Substance and Sexual Activity    Alcohol use: No    Drug use: No    Sexual activity: Defer      Social History     Social History Narrative    Not on file        Family History   Problem Relation Age of  Onset    Dementia Mother     Heart disease Father     Dementia Father     No Known Problems Sister              Review of Systems:     Review of Systems      Physical Exam: 69 y.o. male  General Appearance:    Alert, cooperative, in no acute distress                 There were no vitals filed for this visit.   Patient is alert and read ×3 no acute distress appears her above-listed at height weight and age.  Affect is normal respiratory rate is normal unlabored. Heart rate regular rate rhythm, sclera, dentition and hearing are normal for the purpose of this exam.    Ortho Exam  Physical exam of the right shoulder reveals no overlying skin changes no lymphedema no lymphadenopathy.  Patient has active flexion 180 with mild symptoms abduction is similar external rotation is to 50 and internal rotation to the upper lumbar spine with mild symptoms.  Patient has good rotator cuff strength 4+ over 5 with isometric strength testing with pain.  Patient has a positive impingement and a positive Vidal sign.  He does have some decrease in cervical range of motion in all directions particularly sidebending and rotation but this does not really reproduce his symptoms he is complaining about in his shoulder patient has a normal elbow exam.  Good distal pulses are present  Patient has pain with overhead activity and a positive Neer sign and a positive empty can sign , a positive drop arm and a definitive painful arc   Large Joint Arthrocentesis: L subacromial bursa  Date/Time: 7/14/2025 10:04 AM  Consent given by: patient  Site marked: site marked  Timeout: Immediately prior to procedure a time out was called to verify the correct patient, procedure, equipment, support staff and site/side marked as required   Supporting Documentation  Indications: pain   Procedure Details  Location: shoulder - L subacromial bursa  Preparation: Patient was prepped and draped in the usual sterile fashion  Needle gauge: 21G.  Approach:  posterior  Medications administered: 80 mg methylPREDNISolone acetate 80 MG/ML; 2 mL lidocaine PF 1% 1 %  Patient tolerance: patient tolerated the procedure well with no immediate complications            Radiology:   AP, Scapular Y and Axillary Lateral of the right shoulder were o/reviewed to evauate shoulder pain.  He has some acromioclavicular arthritis otherwise no acute bony pathology  Imaging Results (Most Recent)       None          Assessment/Plan: Right shoulder pain I think this is rotator cuff I do think he has some cervical component to this but most of what is M seen today is rotator cuff plan is to proceed with an injection as a diagnostic and therapeutic tool I will start him into physical therapy he is going to call me in 5 weeks if he fails to improve we will pursue other means of testing.  He is a diabetic his last A1c was 7.5 he knows his risk are higher he knows to watch his blood sugars  Cortisone Injection. See procedure note.  Cortisone Injection for DIAGNOSTIC and THERAPUTIC purposes.

## 2025-07-02 ENCOUNTER — OFFICE VISIT (OUTPATIENT)
Dept: NEUROSURGERY | Facility: CLINIC | Age: 70
End: 2025-07-02
Payer: MEDICARE

## 2025-07-02 VITALS
WEIGHT: 252 LBS | BODY MASS INDEX: 40.5 KG/M2 | HEART RATE: 68 BPM | HEIGHT: 66 IN | SYSTOLIC BLOOD PRESSURE: 140 MMHG | DIASTOLIC BLOOD PRESSURE: 78 MMHG | OXYGEN SATURATION: 97 %

## 2025-07-02 DIAGNOSIS — M54.2 CHRONIC NECK PAIN: Primary | ICD-10-CM

## 2025-07-02 DIAGNOSIS — G89.29 CHRONIC NECK PAIN: Primary | ICD-10-CM

## 2025-07-02 DIAGNOSIS — M47.812 CERVICAL SPONDYLOSIS WITHOUT MYELOPATHY: ICD-10-CM

## 2025-07-02 PROCEDURE — 99204 OFFICE O/P NEW MOD 45 MIN: CPT | Performed by: NEUROLOGICAL SURGERY

## 2025-07-02 PROCEDURE — 1160F RVW MEDS BY RX/DR IN RCRD: CPT | Performed by: NEUROLOGICAL SURGERY

## 2025-07-02 PROCEDURE — 3078F DIAST BP <80 MM HG: CPT | Performed by: NEUROLOGICAL SURGERY

## 2025-07-02 PROCEDURE — 1159F MED LIST DOCD IN RCRD: CPT | Performed by: NEUROLOGICAL SURGERY

## 2025-07-02 PROCEDURE — 3077F SYST BP >= 140 MM HG: CPT | Performed by: NEUROLOGICAL SURGERY

## 2025-07-07 ENCOUNTER — HOSPITAL ENCOUNTER (OUTPATIENT)
Dept: CARDIOLOGY | Facility: HOSPITAL | Age: 70
Discharge: HOME | End: 2025-07-07
Admitting: STUDENT IN AN ORGANIZED HEALTH CARE EDUCATION/TRAINING PROGRAM
Payer: MEDICARE

## 2025-07-07 DIAGNOSIS — R20.0 LEFT FACIAL NUMBNESS: ICD-10-CM

## 2025-07-07 LAB
BH CV XLRA MEAS LEFT DIST CCA EDV: -25.1 CM/SEC
BH CV XLRA MEAS LEFT DIST CCA PSV: -99.6 CM/SEC
BH CV XLRA MEAS LEFT DIST ICA EDV: -33.6 CM/SEC
BH CV XLRA MEAS LEFT DIST ICA PSV: -91.8 CM/SEC
BH CV XLRA MEAS LEFT ICA/CCA RATIO: 0.92
BH CV XLRA MEAS LEFT MID ICA EDV: -26.7 CM/SEC
BH CV XLRA MEAS LEFT MID ICA PSV: -90.2 CM/SEC
BH CV XLRA MEAS LEFT PROX CCA EDV: 31.2 CM/SEC
BH CV XLRA MEAS LEFT PROX CCA PSV: 153.4 CM/SEC
BH CV XLRA MEAS LEFT PROX ECA EDV: -30.5 CM/SEC
BH CV XLRA MEAS LEFT PROX ECA PSV: -180.5 CM/SEC
BH CV XLRA MEAS LEFT PROX ICA EDV: -24.3 CM/SEC
BH CV XLRA MEAS LEFT PROX ICA PSV: -81.5 CM/SEC
BH CV XLRA MEAS LEFT PROX SCLA PSV: 249.7 CM/SEC
BH CV XLRA MEAS LEFT VERTEBRAL A EDV: 16.8 CM/SEC
BH CV XLRA MEAS LEFT VERTEBRAL A PSV: 62.1 CM/SEC
BH CV XLRA MEAS RIGHT DIST CCA EDV: -20.5 CM/SEC
BH CV XLRA MEAS RIGHT DIST CCA PSV: -103.1 CM/SEC
BH CV XLRA MEAS RIGHT DIST ICA EDV: -29.4 CM/SEC
BH CV XLRA MEAS RIGHT DIST ICA PSV: -96 CM/SEC
BH CV XLRA MEAS RIGHT ICA/CCA RATIO: 0.97
BH CV XLRA MEAS RIGHT MID ICA EDV: -30.8 CM/SEC
BH CV XLRA MEAS RIGHT MID ICA PSV: -91.8 CM/SEC
BH CV XLRA MEAS RIGHT PROX CCA EDV: 23 CM/SEC
BH CV XLRA MEAS RIGHT PROX CCA PSV: 117.4 CM/SEC
BH CV XLRA MEAS RIGHT PROX ECA EDV: -20.8 CM/SEC
BH CV XLRA MEAS RIGHT PROX ECA PSV: -148.2 CM/SEC
BH CV XLRA MEAS RIGHT PROX ICA EDV: -23.8 CM/SEC
BH CV XLRA MEAS RIGHT PROX ICA PSV: -99.5 CM/SEC
BH CV XLRA MEAS RIGHT PROX SCLA PSV: 140.4 CM/SEC
BH CV XLRA MEAS RIGHT VERTEBRAL A EDV: -14.1 CM/SEC
BH CV XLRA MEAS RIGHT VERTEBRAL A PSV: -50.6 CM/SEC

## 2025-07-07 PROCEDURE — 93880 EXTRACRANIAL BILAT STUDY: CPT

## 2025-07-07 PROCEDURE — 93880 EXTRACRANIAL BILAT STUDY: CPT | Performed by: SURGERY

## 2025-07-09 ENCOUNTER — TELEPHONE (OUTPATIENT)
Dept: FAMILY MEDICINE CLINIC | Facility: CLINIC | Age: 70
End: 2025-07-09
Payer: MEDICARE

## 2025-07-09 ENCOUNTER — TELEPHONE (OUTPATIENT)
Dept: FAMILY MEDICINE CLINIC | Facility: CLINIC | Age: 70
End: 2025-07-09

## 2025-07-09 NOTE — TELEPHONE ENCOUNTER
Patient called and informed about his paperwork and how we are waiting for a note to come back to finish it up. He said we could fax it out and bill him on the 23rd at the appointment. He understood about the wording of the paper work had to be changed and it was not a workers comp. because he has had arthritis for a long time. I stated that he'll get a call back on Friday or Monday about the paperwork.

## 2025-07-14 ENCOUNTER — OFFICE VISIT (OUTPATIENT)
Dept: ORTHOPEDIC SURGERY | Facility: CLINIC | Age: 70
End: 2025-07-14
Payer: MEDICARE

## 2025-07-14 VITALS — HEIGHT: 66 IN | WEIGHT: 254.6 LBS | TEMPERATURE: 98.4 F | BODY MASS INDEX: 40.92 KG/M2

## 2025-07-14 DIAGNOSIS — M75.42 IMPINGEMENT SYNDROME OF LEFT SHOULDER: Primary | ICD-10-CM

## 2025-07-14 PROCEDURE — 1159F MED LIST DOCD IN RCRD: CPT | Performed by: ORTHOPAEDIC SURGERY

## 2025-07-14 PROCEDURE — 99204 OFFICE O/P NEW MOD 45 MIN: CPT | Performed by: ORTHOPAEDIC SURGERY

## 2025-07-14 PROCEDURE — 20610 DRAIN/INJ JOINT/BURSA W/O US: CPT | Performed by: ORTHOPAEDIC SURGERY

## 2025-07-14 PROCEDURE — 1160F RVW MEDS BY RX/DR IN RCRD: CPT | Performed by: ORTHOPAEDIC SURGERY

## 2025-07-14 RX ORDER — LIDOCAINE HYDROCHLORIDE 10 MG/ML
2 INJECTION, SOLUTION EPIDURAL; INFILTRATION; INTRACAUDAL; PERINEURAL
Status: COMPLETED | OUTPATIENT
Start: 2025-07-14 | End: 2025-07-14

## 2025-07-14 RX ORDER — METHYLPREDNISOLONE ACETATE 80 MG/ML
80 INJECTION, SUSPENSION INTRA-ARTICULAR; INTRALESIONAL; INTRAMUSCULAR; SOFT TISSUE
Status: COMPLETED | OUTPATIENT
Start: 2025-07-14 | End: 2025-07-14

## 2025-07-14 RX ADMIN — LIDOCAINE HYDROCHLORIDE 2 ML: 10 INJECTION, SOLUTION EPIDURAL; INFILTRATION; INTRACAUDAL; PERINEURAL at 10:04

## 2025-07-14 RX ADMIN — METHYLPREDNISOLONE ACETATE 80 MG: 80 INJECTION, SUSPENSION INTRA-ARTICULAR; INTRALESIONAL; INTRAMUSCULAR; SOFT TISSUE at 10:04

## 2025-07-22 NOTE — PROGRESS NOTES
NAME: Bob Escamilla   : 1955    Chief Complaint   Patient presents with    Neck pain/weakness        HPI:  Bob Escamilla is a 69 y.o.  right-handed male sent for a follow-up in clinic from a hospitalization seen by Dr. Colon, with the following history taken from that note with additions/modifications as indicated:    Patient has a past medical history of type 2 diabetes, osteoarthritis, asthma, hypertension, and ALEJANDRINA on CPAP.  He presented to [facility] ER with neck pain and left arm weakness and numbness as well as left facial numbness.  He reported for the past couple weeks he has been having left arm weakness and numbness.  He denied any speech changes, headaches or visual changes.  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  Stroke or TIA was thought to be less likely and thought to be more of an acute on chronic neck pain from work-related injury.  He was discharged on baclofen as needed, lidocaine patch, Medrol Dosepak and physical therapy.  EMG was recommended if symptoms did not resolve.    MRI brain did not show any acute intracranial abnormality or abnormal enhancement.  There was some temporal atrophy as well as periventricular and deep white matter microangiopathic changes.  MRI cervical spine showed degenerative changes with mild canal stenosis at C5-6 and C6-7 with mild to moderate foraminal narrowing.  There was no severe canal stenosis.  There was some fullness within the perivertebral soft tissues extending from C4-T2, with potential edema and enhancement noted so a CT of the neck soft tissue was ordered which did not show any evidence of perivertebral mass or abnormal enhancement and was thought likely to represent the esophagus.  Carotid ultrasound right and left internal carotid artery demonstrated less than 50% stenosis.    Reviewing chart labs: TSH 2.150, CRP <0.30, sed rate  8, Cholesterol 157, HDL 42, , triglycerides 75, CBC and CMP all were unremarkable.    Patient followed up with orthopedics who thought his left shoulder pain was more rotator cuff although could have some cervical component.  He was given cortisone injection and referred to PT.    History interim    Patient states he has been doing pretty good since discharge and is wanting to return to work.  He continues physical therapy twice a week which has helped with his neck pain and range of motion.  He states he has also been doing brianna chi at home which is helpful.  He reports some achiness in his neck that occasionally radiates down his arms depending on what he is doing but specifically if he is stretching his arms all the way out or extending them over his head.  He denies any weakness in his arms but notices some weakness in his arms if he is cranking up a trailer with the repetitive motions.      He followed up with orthopedics and had a steroid shot in his left shoulder but he cannot tell if it is helping.  He denies any numbness, tingling or painful symptoms in his face, hands or feet.  He reports arthritis in his hands and states that sometimes they are stiff.  He denies any visual or speech changes.  He denies any headaches.  He denies any unilateral weakness or incoordination or any other strokelike symptoms.  He reports some right leg weakness after his right hip replacement, he states it is harder to lift now.  He denies any unsteadiness on his feet or any recent falls.  He reports occasional low back pain but denies any radicular pain down either leg.  He denies any incontinence of bowel or bladder.  He continues to take Flexeril 5 mg in the morning and 5 mg at night which helps relax his muscles.  He denies any memory concerns but states he tends to forget names.  He remains independent with ADLs and chores.  He has been taking care of his wife for the last 4 weeks since she had a shoulder replacement.   He followed up with Dr. Hudson with neurosurgery regarding his neck and he was referred to PM&R for a functional assessment.  Patient states he is still waiting to get this scheduled.  He is hoping to be cleared for work after doing his functional assessment with PM&R.  He states he is hoping to get another 2 years before he retires so he and his wife can travel in RV around the country.    He denies any history of meningitis, stroke, seizures, head or spinal trauma, brain tumor, syncope, hemorrhage or aneurysm of brain artery.  He denies any history thyroid disease.  He reports type 2 diabetes and takes a combination of glimepiride and pioglitazone.  He denies any alcohol use.  He denies any family history of seizures, brain tumor or aneurysm of brain artery.  He reports his paternal Grandmother had a stroke.       Past Medical History:   Diagnosis Date    Arthritis     Asthma     Calculus of gallbladder without cholecystitis without obstruction 08/30/2022    Diabetes mellitus     GERD (gastroesophageal reflux disease)     Hospital discharge follow-up 06/25/2025    Hx of cholecystectomy 01/26/2024    Hypertension     Inguinal hernia of left side without obstruction or gangrene     Medicare annual wellness visit, subsequent 09/19/2023    Sleep apnea          Past Surgical History:   Procedure Laterality Date    CATARACT EXTRACTION WITH INTRAOCULAR LENS IMPLANT Bilateral 01/2017    CHOLECYSTECTOMY N/A 08/30/2022    Procedure: CHOLECYSTECTOMY LAPAROSCOPIC WITH CHOLIANGIOGRAM;  Surgeon: Darin Juan MD;  Location: [facility];  Service: General;  Laterality: N/A;    COLONOSCOPY  10/07/2015    ENDOSCOPY N/A 2017    INGUINAL HERNIA REPAIR Left     REPLACEMENT TOTAL HIP LATERAL POSITION      right            Current Outpatient Medications:     acetaminophen (TYLENOL) 650 MG 8 hr tablet, Take 1 tablet by mouth Every 8 (Eight) Hours As Needed for Mild Pain., Disp: 100 tablet, Rfl: 1    albuterol sulfate   (90 Base) MCG/ACT inhaler, Inhale 2 puffs Every 4 (Four) Hours As Needed for Wheezing or Shortness of Air., Disp: 6.7 g, Rfl: 3    aluminum-magnesium hydroxide-simethicone (MAALOX/MYLANTA) 200-200-20 MG/5ML suspension, Take 15 mL by mouth As Needed for Indigestion or Heartburn., Disp: , Rfl:     amLODIPine (NORVASC) 10 MG tablet, Take 1 tablet by mouth Daily., Disp: , Rfl:     amoxicillin-clavulanate (AUGMENTIN) 875-125 MG per tablet, , Disp: , Rfl:     aspirin 81 MG EC tablet, Take 1 tablet by mouth Daily., Disp: 90 tablet, Rfl: 3    B Complex Vitamins (VITAMIN-B COMPLEX PO), Take  by mouth., Disp: , Rfl:     celecoxib (CeleBREX) 200 MG capsule, TAKE ONE CAPSULE BY MOUTH DAILY WITH FOOD, Disp: 90 capsule, Rfl: 0    cetirizine (zyrTEC) 10 MG tablet, Take 1 tablet by mouth Daily., Disp: , Rfl:     Cholecalciferol 50 MCG (2000 UT) tablet, Take 1 tablet by mouth Daily., Disp: 30 each, Rfl: 5    cyclobenzaprine (FLEXERIL) 5 MG tablet, Take 2 tablets by mouth At Night As Needed for Muscle Spasms. Please do not drive or operate heavy machinery after taking the medication, as the medication may cause drowsiness., Disp: 60 tablet, Rfl: 5    diphenhydrAMINE (BENADRYL) 25 MG tablet, Take  by mouth., Disp: , Rfl:     Ferrous Gluconate (IRON 27 PO), Take 1 tablet by mouth Daily., Disp: , Rfl:     ferrous sulfate 325 (65 FE) MG EC tablet, Take 1 tablet by mouth Daily., Disp: , Rfl:     fexofenadine (ALLEGRA) 180 MG tablet, Take 1 tablet by mouth Daily., Disp: 90 tablet, Rfl: 1    fluticasone (FLONASE) 50 MCG/ACT nasal spray, , Disp: , Rfl:     glimepiride (Amaryl) 2 MG tablet, Take 1 tablet by mouth Every Morning Before Breakfast., Disp: 180 tablet, Rfl: 1    Glucosamine 500 MG capsule, Take  by mouth., Disp: , Rfl:     Glucosamine-Chondroitin (OSTEO BI-FLEX REGULAR STRENGTH PO), Take 1 tablet by mouth 2 (Two) Times a Day., Disp: , Rfl:     glucose blood test strip, DX E11.9 check bs  daily accu check guide strips, Disp: 100  each, Rfl: 5    guaiFENesin (MUCINEX) 600 MG 12 hr tablet, Take 2 tablets by mouth 2 (Two) Times a Day., Disp: 30 tablet, Rfl: 0    hydrOXYzine (ATARAX) 25 MG tablet, TAKE TWO TABLETS BY MOUTH AT NIGHT AS NEEDED FOR ITCHING, ALLERGIES OR ANXIETY (Patient taking differently: Take 0.5 tablets by mouth.), Disp: 60 tablet, Rfl: 1    Lancets misc, DX E11.9 Check bs once daily, Disp: 100 each, Rfl: 1    Lidocaine 4 %, Place 1 patch on the skin as directed by provider Daily. Remove & Discard patch within 12 hours or as directed by MD, Disp: 10 patch, Rfl: 0    methylPREDNISolone (MEDROL) 4 MG dose pack, Take as directed on package instructions., Disp: 21 tablet, Rfl: 0    Misc Natural Products (PROSTATE HEALTH PO), Take  by mouth., Disp: , Rfl:     Misc Natural Products (Urinozinc Plus) tablet, Take 1 tablet by mouth 2 (Two) Times a Day. 1 in AM, 2 at night, Disp: , Rfl:     montelukast (Singulair) 10 MG tablet, Take 1 tablet by mouth Every Night. (Patient taking differently: Take 0.5 tablets by mouth 2 (Two) Times a Day.), Disp: 90 tablet, Rfl: 1    multivitamin with minerals (Multivitamin Adult) tablet tablet, Take 1 tablet by mouth Daily for 360 days., Disp: 90 tablet, Rfl: 3    mupirocin (BACTROBAN) 2 % ointment, , Disp: , Rfl:     NON FORMULARY, Take 1 tablet by mouth 2 (Two) Times a Day. Focus Factor, Disp: , Rfl:     omega-3 acid ethyl esters (LOVAZA) 1 g capsule, Take 1 capsule by mouth 2 (Two) Times a Day., Disp: 180 capsule, Rfl: 3    oxymetazoline (AFRIN) 0.05 % nasal spray, Administer 2 sprays into the nostril(s) as directed by provider Every Night., Disp: , Rfl:     pantoprazole (PROTONIX) 40 MG EC tablet, Take 1 tablet by mouth Daily., Disp: 90 tablet, Rfl: 3    pioglitazone (ACTOS) 30 MG tablet, TAKE 1 TABLET BY MOUTH DAILY, Disp: 90 tablet, Rfl: 1    rOPINIRole (REQUIP) 1 MG tablet, Take 1 tablet by mouth Every Night., Disp: 90 tablet, Rfl: 1    saccharomyces boulardii (Florastor) 250 MG capsule, Take 1  "capsule by mouth 2 (Two) Times a Day., Disp: 90 capsule, Rfl: 2    terbinafine (lamiSIL) 250 MG tablet, Take 1 tablet by mouth., Disp: , Rfl:     valsartan-hydrochlorothiazide (DIOVAN-HCT) 320-25 MG per tablet, Take 1 tablet by mouth Daily., Disp: 90 tablet, Rfl: 3    ASHWAGANDHA PO, Take  by mouth., Disp: , Rfl:       Family History   Problem Relation Age of Onset    Dementia Mother     Heart disease Father     Dementia Father     No Known Problems Sister          Social History     Socioeconomic History    Marital status:    Tobacco Use    Smoking status: Never    Smokeless tobacco: Never   Vaping Use    Vaping status: Never Used   Substance and Sexual Activity    Alcohol use: No    Drug use: No    Sexual activity: Defer         Allergies   Allergen Reactions    Benzamide Derivatives     Formaldehyde Itching     Pt is only able to wear cotton clothing    Benzonatate Rash    Corticosteroids Rash     An allergy to hydrocortisone cream, has had no reaction other than from an allergy test performed 20 years ago    Cortisone Rash    Fenugreek (Trigonella Foenum-Graecum) Rash         Pain Scale: 1/10 neck        ROS:  Review of Systems   HENT:  Negative for trouble swallowing.    Eyes:  Negative for visual disturbance.   Musculoskeletal:  Positive for neck pain. Negative for back pain and gait problem.   Neurological:  Positive for weakness. Negative for dizziness, tremors, seizures, syncope, facial asymmetry, speech difficulty, light-headedness, numbness and headaches.       I have reviewed and agree with the above ROS complete by medical assistant.    Physical Exam:  Vitals:    07/24/25 0838   BP: 126/60   Pulse: 67   SpO2: 95%   Weight: 112 kg (247 lb)   Height: 167.6 cm (65.98\")       Orthostatic BP:       Physical Exam  General: Obese white male no acute distress  HEENT: Normocephalic no evidence of trauma  Neck: Supple.  Negative Lhermitte sign.  Reduced range of motion  Heart: Regular rate and " rhythm  Extremities: Radial pulses strong and simultaneous.  No pedal edema.      Neurological Exam:   Mental Status: Awake, alert, oriented to person, place and time.  Conversant without evidence of an affective disorder, thought disorder, delusions or hallucinations.  Attention span and concentration are normal.  HCF: No aphasia, apraxia or dysarthria.  Recent and remote memory intact.  Knowledge of recent events intact.  CN: I:   II: Visual fields full without left inattention   III, IV, VI: Eye movements intact without nystagmus or ptosis.  Pupils equal round and reactive to light.   V,VII: Light touch and pinprick intact all 3 divisions of V.  Facial muscles symmetrical.   VIII: Hearing intact to finger rub.  Bluetooth device in right ear   IX,X: Soft palate elevates symmetrically   XI: Sternomastoid and trapezius are strong.   XII: Tongue midline without atrophy or fasciculations    Motor: Normal tone and bulk in the upper and lower extremities.    Power testing: Mild weakness of APB muscles and interosseous bilaterally but pretty good strength in all her muscles tested in the upper extremities.  There is mild weakness of right iliopsoas muscle and minimal left iliopsoas muscle.  There is minimal weakness of right tibialis anterior muscle and mild weakness of toe extensors and flexors bilaterally.    Reflexes: Upper extremities: +1 diffusely        Lower extremities: Trace knee jerk absent ankle jerk        Toe signs: Downgoing bilaterally        Clonus: Negative     Sensory: Light touch: Diffusely intact in the arms and legs        Pinprick: Diffusely intact in the arms and legs        Vibration: Reduced at the ankles        Position: Intact at the great toes        Temperature:    Cerebellar: Finger-to-nose: Normal           Rapid movement: Slow           Heel-to-shin: Normal    Gait and Station: Comes to stand without difficulty.  Mildly broad-based.  Able to walk on toes and heels.  Pretty good tandem  "walk.  No Romberg no drift.      Results:    Lab Results   Component Value Date    GLUCOSE 109 (H) 06/23/2025    BUN 19.0 06/23/2025    CREATININE 1.03 06/23/2025    EGFRIFNONA 114 08/11/2021    BCR 18.4 06/23/2025    CO2 22.4 06/23/2025    CALCIUM 9.2 06/23/2025    ALBUMIN 4.1 06/23/2025    AST 23 06/23/2025    ALT 24 06/23/2025     Lab Results   Component Value Date    WBC 9.17 06/23/2025    HGB 14.3 06/23/2025    HCT 42.2 06/23/2025    MCV 96.6 06/23/2025     06/23/2025     Lab Results   Component Value Date    TSH 2.150 06/23/2025    X2BGQTB 6.96 09/13/2017     Lab Results   Component Value Date    QYXKTQSU01 730 06/24/2025     Lab Results   Component Value Date    FOLATE >20.00 06/24/2025     Lab Results   Component Value Date    HGBA1C 7.50 (H) 06/24/2025     Lab Results   Component Value Date    SEDRATE 8 06/24/2025     Lab Results   Component Value Date    CRP <0.30 06/24/2025       Assessment:    1.  Chronic neck pain-cervical spondylosis without myelopathy.  Patient reports improvement in neck pain with physical therapy and brianna chi exercises at home.  Patient referred to physical medicine rehab with hopes to return to work, he is still waiting on a call to set up his appointment.  He denies any numbness, tingling or painful symptoms in his face, arms/hands or legs/feet.  He denies any weakness.           Assessment and Plan   Diagnoses and all orders for this visit:    1. Chronic neck pain (Primary)    2. Cervical spondylosis without myelopathy        Ideal targets for risk factor control would be Blood pressure < 130/80, B12 > 500, TSH in normal range and LDL < 70; HbA1c < 6.5 and smoking cessation if applicable. Call 911 for stroke symptoms.  Recommend 150 minutes of physical activity weekly.  Limit alcohol intake.  Continue physical therapy and brianna chi exercises  Schedule appointment with PM&R  Stroke Acronym \"BE FAST\" B=Balance issues, E=Vision changes, F=Face weakness or numbness, A=Arm or Leg " weakness or numbness, S=Speech problems and T=Time to call 911.   Follow-up in 1 year or sooner if needed      Time: Spent 60 minutes in total encounter time. This included time for chart review, obtaining history, performing pertinent physical examination, updating medical information, ordering tests/referrals, discussion of diagnosis, medical management, counseling, and encounter documentation.        ANGIE Peters          Much of this encounter note was dictated utilizing Dragon dictation which is an electronic transcription/translation of spoken language to printed text. The electronic translation of spoken language may permit erroneous, or at times, nonsensical words or phrases to be inadvertently transcribed; Although I have reviewed the note for such errors, some may still exist.    Portions of this assessment have been copied from previous documentation which has been thoroughly reviewed and updated as appropriate.

## 2025-07-23 ENCOUNTER — OFFICE VISIT (OUTPATIENT)
Dept: FAMILY MEDICINE CLINIC | Facility: CLINIC | Age: 70
End: 2025-07-23
Payer: MEDICARE

## 2025-07-23 VITALS
TEMPERATURE: 98.4 F | HEART RATE: 69 BPM | WEIGHT: 249.6 LBS | SYSTOLIC BLOOD PRESSURE: 142 MMHG | HEIGHT: 66 IN | BODY MASS INDEX: 40.11 KG/M2 | OXYGEN SATURATION: 96 % | DIASTOLIC BLOOD PRESSURE: 92 MMHG

## 2025-07-23 DIAGNOSIS — G89.29 CHRONIC NECK PAIN: Primary | Chronic | ICD-10-CM

## 2025-07-23 DIAGNOSIS — M54.2 CHRONIC NECK PAIN: Primary | Chronic | ICD-10-CM

## 2025-07-23 DIAGNOSIS — M54.12 CERVICAL RADICULOPATHY: ICD-10-CM

## 2025-07-23 DIAGNOSIS — M25.512 LEFT SHOULDER PAIN, UNSPECIFIED CHRONICITY: ICD-10-CM

## 2025-07-23 PROBLEM — Z09 HOSPITAL DISCHARGE FOLLOW-UP: Status: RESOLVED | Noted: 2025-06-25 | Resolved: 2025-07-23

## 2025-07-23 PROCEDURE — 1159F MED LIST DOCD IN RCRD: CPT | Performed by: STUDENT IN AN ORGANIZED HEALTH CARE EDUCATION/TRAINING PROGRAM

## 2025-07-23 PROCEDURE — 99213 OFFICE O/P EST LOW 20 MIN: CPT | Performed by: STUDENT IN AN ORGANIZED HEALTH CARE EDUCATION/TRAINING PROGRAM

## 2025-07-23 PROCEDURE — 3051F HG A1C>EQUAL 7.0%<8.0%: CPT | Performed by: STUDENT IN AN ORGANIZED HEALTH CARE EDUCATION/TRAINING PROGRAM

## 2025-07-23 PROCEDURE — 3080F DIAST BP >= 90 MM HG: CPT | Performed by: STUDENT IN AN ORGANIZED HEALTH CARE EDUCATION/TRAINING PROGRAM

## 2025-07-23 PROCEDURE — 1160F RVW MEDS BY RX/DR IN RCRD: CPT | Performed by: STUDENT IN AN ORGANIZED HEALTH CARE EDUCATION/TRAINING PROGRAM

## 2025-07-23 PROCEDURE — 3077F SYST BP >= 140 MM HG: CPT | Performed by: STUDENT IN AN ORGANIZED HEALTH CARE EDUCATION/TRAINING PROGRAM

## 2025-07-23 PROCEDURE — 1125F AMNT PAIN NOTED PAIN PRSNT: CPT | Performed by: STUDENT IN AN ORGANIZED HEALTH CARE EDUCATION/TRAINING PROGRAM

## 2025-07-23 RX ORDER — FLUTICASONE PROPIONATE 50 MCG
SPRAY, SUSPENSION (ML) NASAL
COMMUNITY
Start: 2025-07-09

## 2025-07-23 NOTE — PROGRESS NOTES
"Chief Complaint  4 week f/u    Subjective        Bob Escamilla presents to [facility]  History of Present Illness  69-year-old white male here for follow-up visit and desires return to work release.    Pt states he has requested the [facility] paperwork to be completed again stating his current episode that took him to ER from his job is, \"not work related\". Pt states he wants the paperwork to reflect his issue is, \"not workman's comp related\".    Pt desires to return to work now.  I have reviewed neurosurgeon Dr. Ann's note and informed patient that the neurosurgeon recommended physical medicine rehab consult for evaluation for functional assessment.  Pt was referred to PMR by Dr Hudson for a functional capacity exam.  I recommended patient complete the physical medicine rehab consult prior to return to work release in order to avoid any new incidents related to his neck.    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 Pt has to crank up the romelia which may take lot of strength depending on the load and the situation.    Pt states he has to go to the rail to get his load. Pt states he is by himself at work. Pt states, \"there is no thing as a light duty on his job.\"   I have discussed with my referral clerk regarding the physical medicine rehab referral but it appears it was sent to the wrong specialty and the referral was made to pain management.  I have discussed this with patient and I have reentered the physical medicine rehab referral again so that patient gets a functional assessment by physical medicine and rehab prior to return to work clearance, patient voiced understanding.    Patient reports he has completed several sessions of physical therapy for his neck issue with [place] [facility].  Will obtain records from personal physical therapy as well.    Patient states he has not received any payments " "from his employer for his time off.  I have informed patient I have redone the [facility] paperwork per his request to state that the recent, \"neck issue was not work-related\" and the papers were faxed on July 17.  However we have tried to fax the paperwork again today and updated the patient as well.      Objective   Vital Signs:  /92 (BP Location: Left arm, Patient Position: Sitting, Cuff Size: Adult)   Pulse 69   Temp 98.4 °F (36.9 °C) (Temporal)   Ht 167.6 cm (65.98\")   Wt 113 kg (249 lb 9.6 oz)   SpO2 96%   BMI 40.31 kg/m²   Estimated body mass index is 40.31 kg/m² as calculated from the following:    Height as of this encounter: 167.6 cm (65.98\").    Weight as of this encounter: 113 kg (249 lb 9.6 oz).               Physical Exam  Constitutional:       Appearance: Normal appearance.   HENT:      Head: Normocephalic and atraumatic.   Eyes:      Conjunctiva/sclera: Conjunctivae normal.   Cardiovascular:      Rate and Rhythm: Normal rate and regular rhythm.      Heart sounds: Normal heart sounds.   Pulmonary:      Effort: Pulmonary effort is normal.      Breath sounds: Normal breath sounds.   Abdominal:      General: Bowel sounds are normal.      Palpations: Abdomen is soft.      Comments: Non-tender   Musculoskeletal:      Comments: Mild neck pain with neck ROM.   No trapezius pain bilaterally with neck range of motion.  No cervical spine pain on palpation.   Skin:     General: Skin is warm.   Neurological:      General: No focal deficit present.      Mental Status: He is alert and oriented to person, place, and time.   Psychiatric:         Mood and Affect: Mood normal.         Behavior: Behavior normal.        Result Review :    The following data was reviewed by: ANGIE Tang on 07/23/2025:  CMP          10/2/2024    08:39 1/10/2025    09:46 6/23/2025    16:37   CMP   Glucose 132  150  109    BUN 13  13  19.0    Creatinine 0.83  0.90  1.03    EGFR 95.9  93.6  79.6    Sodium 138  138  136 "    Potassium 4.5  4.6  4.2    Chloride 104  104  101    Calcium 9.4  8.9  9.2    Total Protein 6.9  6.3  7.2    Albumin 4.5  3.9  4.1    Globulin 2.4  2.4  3.1    Total Bilirubin 0.7  0.5  0.2    Alkaline Phosphatase 74  66  70    AST (SGOT) 20  18  23    ALT (SGPT) 24  25  24    Albumin/Globulin Ratio 1.9  1.6  1.3    BUN/Creatinine Ratio 15.7  14.4  18.4    Anion Gap   12.6      CBC          10/2/2024    08:39 1/10/2025    09:46 6/23/2025    16:37   CBC   WBC 7.53  6.30  9.17    RBC 4.58  4.10  4.37    Hemoglobin 15.1  13.7  14.3    Hematocrit 43.9  39.5  42.2    MCV 95.9  96.3  96.6    MCH 33.0  33.4  32.7    MCHC 34.4  34.7  33.9    RDW 12.9  12.7  12.8    Platelets 247  238  272      Lipid Panel          10/2/2024    08:39 2/18/2025    08:45 6/24/2025    03:30   Lipid Panel   Total Cholesterol   157    Total Cholesterol 160  137     Triglycerides 153  101  75    HDL Cholesterol 42  37  42    VLDL Cholesterol 27  19  15    LDL Cholesterol  91  81  100    LDL/HDL Ratio   2.38      TSH          2/18/2025    08:45 6/23/2025    16:37   TSH   TSH 1.420  2.150      A1C Last 3 Results          10/2/2024    08:39 2/18/2025    08:45 6/24/2025    03:30   HGBA1C Last 3 Results   Hemoglobin A1C 6.80  7.1  7.50      Microalbumin          2/18/2025    08:59   Microalbumin   Microalbumin, Urine 29.7      PSA          10/2/2024    08:39   PSA   PSA 0.277      Data reviewed : Radiologic studies reviewed carotid ultrasound report from July 2025 patient had less than 50% stenosis bilaterally.  Left shoulder x-rays reveal AC joint arthritis.  and Consultant notes reviewed orthopedic surgery consult note for left shoulder pain and per Dr. Luis  note patient most likely hasrotator cuff issue and would respond to steroid injection and PT.  Reviewed [facility] endocrinology consult note from July 21, 2025 for diabetes management.  Reviewed neurosurgery consult note from July 2, 2025 with Dr. Hudson and patient was  referred to physical medicine and rehab for chronic neck pain and cervical spondylosis without myelopathy and that there was no neurosurgical intervention at this time and also discussion regarding MCFP age.             Assessment and Plan     Diagnoses and all orders for this visit:    1. Chronic neck pain (Primary)  -     Ambulatory Referral to Physical Medicine Rehab    2. Cervical radiculopathy  -     Ambulatory Referral to Physical Medicine Rehab    3. Left shoulder pain, unspecified chronicity  Assessment & Plan:  Significant improvement in shoulder pain since injection by orthopedic surgery recently.          All chronic conditions have been addressed and treated by the practice or other specialists. Medications have been reconciled and refilled as appropriate. Reiterated compliance and timely follow up appointments. Side effects of all new and old medications reviewed with the patient and patient willing to accept all risks involved. Advised RTO if no improvement or worsening of symptoms or if any new complaints arise. Patient advised to follow up with clinic or call after diagnostic tests, if patient does not hear from office 3 days after the test completion.          Follow Up     Return in about 3 weeks (around 8/13/2025) for Next scheduled follow up.  Patient was given instructions and counseling regarding his condition or for health maintenance advice. Please see specific information pulled into the AVS if appropriate.          contact guard

## 2025-07-24 ENCOUNTER — TELEPHONE (OUTPATIENT)
Dept: NEUROSURGERY | Facility: CLINIC | Age: 70
End: 2025-07-24

## 2025-07-24 ENCOUNTER — OFFICE VISIT (OUTPATIENT)
Dept: NEUROLOGY | Facility: CLINIC | Age: 70
End: 2025-07-24
Payer: MEDICARE

## 2025-07-24 VITALS
WEIGHT: 247 LBS | HEART RATE: 67 BPM | HEIGHT: 66 IN | BODY MASS INDEX: 39.7 KG/M2 | DIASTOLIC BLOOD PRESSURE: 60 MMHG | OXYGEN SATURATION: 95 % | SYSTOLIC BLOOD PRESSURE: 126 MMHG

## 2025-07-24 DIAGNOSIS — M54.2 CHRONIC NECK PAIN: Primary | ICD-10-CM

## 2025-07-24 DIAGNOSIS — G89.29 CHRONIC NECK PAIN: Primary | ICD-10-CM

## 2025-07-24 DIAGNOSIS — M47.812 CERVICAL SPONDYLOSIS WITHOUT MYELOPATHY: ICD-10-CM

## 2025-07-25 ENCOUNTER — TELEPHONE (OUTPATIENT)
Dept: NEUROSURGERY | Facility: CLINIC | Age: 70
End: 2025-07-25
Payer: MEDICARE

## 2025-07-25 ENCOUNTER — TELEPHONE (OUTPATIENT)
Dept: FAMILY MEDICINE CLINIC | Facility: CLINIC | Age: 70
End: 2025-07-25

## 2025-07-25 NOTE — TELEPHONE ENCOUNTER
07/25/25 [facility]. Trinity Ramos.  I spoke with patient. He has misplaced his referral place by Dr Hudson for physical medicine rehab. I handed this referral to him while in office on 07/02/25 with contact information to Dr Finn's office.  He said, he hasn't heard from Dr Finn's office.  I called Dr Finn's office in attempt to schedule patient. They said, their referral process is taking a long time and could be longer.  Caller could not tell me how long it's taking.   Referral was successfully faxed on 07/02/25 to fax number [phone]. Caller said, this was the correct fax number while I was   speaking with her.  I was instructed to have the patient call their office [phone] and schedule an office appointment.  Patient will need to have referral in hand.   I called patient provided phone number to Dr Finn's office. I mailed out a copy of referral for physical medicine rehab to patient address on file.    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      On 07/23/25 patient account shows that Dr Lanette Alexander has also placed a referral for physical medicine rehab to a different facility/physician.      ++HUB: ok to communicate. Thanks!+++

## 2025-07-25 NOTE — TELEPHONE ENCOUNTER
PT CALLED: [facility] HIS PCP PLACED AN ORDER TO [facility] PHYSICAL MEDICINE AND IS GOING TO USE HIS PCP'S REFERRAL D/T DR. BENSON'S OFFICE CAN'T GET HIM IN UNTIL OCT AND THEY DO NOT TREAT THE SHOULDER. HE IS WONDERING IF THERE MAY BE A PLACE THAT CAN GET HIM IN NEXT WEEK. PLEASE ADVISE! THANKS!    PT CONTACT: [phone]  BEST TIME TO CALL: ANYTIME

## 2025-07-28 ENCOUNTER — TELEPHONE (OUTPATIENT)
Dept: FAMILY MEDICINE CLINIC | Facility: CLINIC | Age: 70
End: 2025-07-28
Payer: MEDICARE

## 2025-07-28 NOTE — TELEPHONE ENCOUNTER
GOT INTO [facility] REHAB MEDICINE ON 8-21-25. HE WILL NEED HIS SHORT TERM DISABILITY EXTENDED BECAUSE IT CURRENTLY ENDS ON 8-1-25. PLEASE CALL TO LET HIM KNOW WHEN THIS WILL BE DONE.

## 2025-08-01 DIAGNOSIS — M54.2 CERVICALGIA: Primary | ICD-10-CM

## 2025-08-01 DIAGNOSIS — M25.512 BILATERAL SHOULDER PAIN, UNSPECIFIED CHRONICITY: ICD-10-CM

## 2025-08-01 DIAGNOSIS — M25.511 BILATERAL SHOULDER PAIN, UNSPECIFIED CHRONICITY: ICD-10-CM

## 2025-08-13 ENCOUNTER — OFFICE VISIT (OUTPATIENT)
Dept: FAMILY MEDICINE CLINIC | Facility: CLINIC | Age: 70
End: 2025-08-13
Payer: MEDICARE

## 2025-08-13 VITALS
HEART RATE: 70 BPM | HEIGHT: 66 IN | WEIGHT: 251 LBS | SYSTOLIC BLOOD PRESSURE: 138 MMHG | TEMPERATURE: 97.3 F | DIASTOLIC BLOOD PRESSURE: 70 MMHG | BODY MASS INDEX: 40.34 KG/M2 | OXYGEN SATURATION: 97 %

## 2025-08-13 DIAGNOSIS — M54.2 CERVICALGIA: Primary | ICD-10-CM

## 2025-08-13 DIAGNOSIS — M25.511 BILATERAL SHOULDER PAIN, UNSPECIFIED CHRONICITY: ICD-10-CM

## 2025-08-13 DIAGNOSIS — Z01.810 ENCOUNTER FOR PRE-OPERATIVE CARDIOVASCULAR CLEARANCE: ICD-10-CM

## 2025-08-13 DIAGNOSIS — I10 PRIMARY HYPERTENSION: Chronic | ICD-10-CM

## 2025-08-13 DIAGNOSIS — G89.29 CHRONIC NECK PAIN: Chronic | ICD-10-CM

## 2025-08-13 DIAGNOSIS — M54.2 CHRONIC NECK PAIN: Chronic | ICD-10-CM

## 2025-08-13 DIAGNOSIS — R94.31 ABNORMAL EKG: ICD-10-CM

## 2025-08-13 DIAGNOSIS — M25.512 BILATERAL SHOULDER PAIN, UNSPECIFIED CHRONICITY: ICD-10-CM

## 2025-08-19 ENCOUNTER — TELEPHONE (OUTPATIENT)
Dept: FAMILY MEDICINE CLINIC | Facility: CLINIC | Age: 70
End: 2025-08-19
Payer: MEDICARE

## 2025-08-25 ENCOUNTER — OFFICE VISIT (OUTPATIENT)
Dept: CARDIOLOGY | Facility: CLINIC | Age: 70
End: 2025-08-25
Payer: MEDICARE

## 2025-08-25 VITALS
HEART RATE: 86 BPM | HEIGHT: 66 IN | WEIGHT: 249 LBS | BODY MASS INDEX: 40.02 KG/M2 | SYSTOLIC BLOOD PRESSURE: 124 MMHG | DIASTOLIC BLOOD PRESSURE: 82 MMHG

## 2025-08-25 DIAGNOSIS — I10 PRIMARY HYPERTENSION: Primary | ICD-10-CM

## 2025-08-26 ENCOUNTER — OFFICE VISIT (OUTPATIENT)
Dept: FAMILY MEDICINE CLINIC | Facility: CLINIC | Age: 70
End: 2025-08-26
Payer: MEDICARE

## 2025-08-26 VITALS
DIASTOLIC BLOOD PRESSURE: 60 MMHG | WEIGHT: 252 LBS | SYSTOLIC BLOOD PRESSURE: 140 MMHG | BODY MASS INDEX: 40.5 KG/M2 | TEMPERATURE: 98 F | HEIGHT: 66 IN | RESPIRATION RATE: 18 BRPM | HEART RATE: 63 BPM | OXYGEN SATURATION: 94 %

## 2025-08-26 DIAGNOSIS — G89.29 CHRONIC NECK PAIN: Primary | Chronic | ICD-10-CM

## 2025-08-26 DIAGNOSIS — M25.511 BILATERAL SHOULDER PAIN, UNSPECIFIED CHRONICITY: ICD-10-CM

## 2025-08-26 DIAGNOSIS — M47.812 CERVICAL SPONDYLOSIS WITHOUT MYELOPATHY: ICD-10-CM

## 2025-08-26 DIAGNOSIS — M54.2 CHRONIC NECK PAIN: Primary | Chronic | ICD-10-CM

## 2025-08-26 DIAGNOSIS — M25.512 BILATERAL SHOULDER PAIN, UNSPECIFIED CHRONICITY: ICD-10-CM

## 2025-08-26 DIAGNOSIS — M48.02 FORAMINAL STENOSIS OF CERVICAL REGION: ICD-10-CM

## 2025-08-26 DIAGNOSIS — M54.2 CERVICALGIA: ICD-10-CM

## 2025-08-26 PROCEDURE — 1160F RVW MEDS BY RX/DR IN RCRD: CPT | Performed by: STUDENT IN AN ORGANIZED HEALTH CARE EDUCATION/TRAINING PROGRAM

## 2025-08-26 PROCEDURE — 3077F SYST BP >= 140 MM HG: CPT | Performed by: STUDENT IN AN ORGANIZED HEALTH CARE EDUCATION/TRAINING PROGRAM

## 2025-08-26 PROCEDURE — 3078F DIAST BP <80 MM HG: CPT | Performed by: STUDENT IN AN ORGANIZED HEALTH CARE EDUCATION/TRAINING PROGRAM

## 2025-08-26 PROCEDURE — 1159F MED LIST DOCD IN RCRD: CPT | Performed by: STUDENT IN AN ORGANIZED HEALTH CARE EDUCATION/TRAINING PROGRAM

## 2025-08-26 PROCEDURE — 3051F HG A1C>EQUAL 7.0%<8.0%: CPT | Performed by: STUDENT IN AN ORGANIZED HEALTH CARE EDUCATION/TRAINING PROGRAM

## 2025-08-26 PROCEDURE — 99213 OFFICE O/P EST LOW 20 MIN: CPT | Performed by: STUDENT IN AN ORGANIZED HEALTH CARE EDUCATION/TRAINING PROGRAM

## 2025-08-26 PROCEDURE — 1125F AMNT PAIN NOTED PAIN PRSNT: CPT | Performed by: STUDENT IN AN ORGANIZED HEALTH CARE EDUCATION/TRAINING PROGRAM

## 2025-08-27 DIAGNOSIS — Z79.4 TYPE 2 DIABETES MELLITUS WITHOUT COMPLICATION, WITH LONG-TERM CURRENT USE OF INSULIN: ICD-10-CM

## 2025-08-27 DIAGNOSIS — E11.9 TYPE 2 DIABETES MELLITUS WITHOUT COMPLICATION, WITH LONG-TERM CURRENT USE OF INSULIN: ICD-10-CM

## 2025-08-27 RX ORDER — PIOGLITAZONE 30 MG/1
30 TABLET ORAL DAILY
Qty: 90 TABLET | Refills: 1 | Status: SHIPPED | OUTPATIENT
Start: 2025-08-27

## (undated) DEVICE — ENDOPATH XCEL BLADELESS TROCARS WITH STABILITY SLEEVES: Brand: ENDOPATH XCEL

## (undated) DEVICE — GOWN ,SIRUS,NONREINFORCED SMALL: Brand: MEDLINE

## (undated) DEVICE — ENDOCUT SCISSOR TIP, DISPOSABLE: Brand: RENEW

## (undated) DEVICE — DISPOSABLE MONOPOLAR ENDOSCOPIC CORD 10 FT. (3M): Brand: KIRWAN

## (undated) DEVICE — ADHS SKIN SURG TISS VISC PREMIERPRO EXOFIN HI/VISC FAST/DRY

## (undated) DEVICE — SUT VIC 5/0 PS2 18IN J495H

## (undated) DEVICE — PATIENT RETURN ELECTRODE, SINGLE-USE, CONTACT QUALITY MONITORING, ADULT, WITH 9FT CORD, FOR PATIENTS WEIGING OVER 33LBS. (15KG): Brand: MEGADYNE

## (undated) DEVICE — GLV SURG BIOGEL LTX PF 6

## (undated) DEVICE — SUT VIC 0 TN 27IN DYED JTN0G

## (undated) DEVICE — SKIN PREP TRAY W/CHG: Brand: MEDLINE INDUSTRIES, INC.

## (undated) DEVICE — ENDOPATH PNEUMONEEDLE INSUFFLATION NEEDLES WITH LUER LOCK CONNECTORS 120MM: Brand: ENDOPATH

## (undated) DEVICE — LAPAROSCOPIC SMOKE ELIMINATION DEVICE: Brand: PNEUVIEW XE

## (undated) DEVICE — GLV SURG PREMIERPRO ORTHO LTX PF SZ7.5 BRN

## (undated) DEVICE — LOU LAP CHOLE: Brand: MEDLINE INDUSTRIES, INC.

## (undated) DEVICE — ENDOPOUCH RETRIEVER SPECIMEN RETRIEVAL BAGS: Brand: ENDOPOUCH RETRIEVER